# Patient Record
Sex: FEMALE | Race: WHITE | NOT HISPANIC OR LATINO | Employment: OTHER | URBAN - NONMETROPOLITAN AREA
[De-identification: names, ages, dates, MRNs, and addresses within clinical notes are randomized per-mention and may not be internally consistent; named-entity substitution may affect disease eponyms.]

---

## 2017-05-24 ENCOUNTER — OFFICE VISIT (OUTPATIENT)
Dept: CARDIOLOGY | Facility: CLINIC | Age: 52
End: 2017-05-24

## 2017-05-24 VITALS
HEIGHT: 67 IN | SYSTOLIC BLOOD PRESSURE: 161 MMHG | WEIGHT: 201.2 LBS | BODY MASS INDEX: 31.58 KG/M2 | HEART RATE: 93 BPM | DIASTOLIC BLOOD PRESSURE: 97 MMHG | OXYGEN SATURATION: 97 %

## 2017-05-24 DIAGNOSIS — I10 ESSENTIAL HYPERTENSION: ICD-10-CM

## 2017-05-24 DIAGNOSIS — Z00.00 HEALTHCARE MAINTENANCE: ICD-10-CM

## 2017-05-24 DIAGNOSIS — I25.10 CORONARY ARTERY DISEASE INVOLVING NATIVE CORONARY ARTERY OF NATIVE HEART WITHOUT ANGINA PECTORIS: Primary | ICD-10-CM

## 2017-05-24 DIAGNOSIS — G47.33 OSA ON CPAP: ICD-10-CM

## 2017-05-24 DIAGNOSIS — Z99.89 OSA ON CPAP: ICD-10-CM

## 2017-05-24 DIAGNOSIS — E78.00 HYPERCHOLESTEROLEMIA: ICD-10-CM

## 2017-05-24 PROCEDURE — 99214 OFFICE O/P EST MOD 30 MIN: CPT | Performed by: NURSE PRACTITIONER

## 2017-05-24 RX ORDER — HYDROCODONE BITARTRATE AND ACETAMINOPHEN 7.5; 325 MG/1; MG/1
1 TABLET ORAL EVERY 6 HOURS PRN
COMMUNITY

## 2017-05-24 RX ORDER — CLOPIDOGREL BISULFATE 75 MG/1
TABLET ORAL DAILY
COMMUNITY
Start: 2013-06-12 | End: 2017-05-24

## 2017-05-24 RX ORDER — AMITRIPTYLINE HYDROCHLORIDE 25 MG/1
25 TABLET, FILM COATED ORAL NIGHTLY
COMMUNITY
End: 2019-02-06

## 2017-05-24 RX ORDER — CLONIDINE HYDROCHLORIDE 0.1 MG/1
TABLET ORAL 2 TIMES DAILY
COMMUNITY
Start: 2013-06-12 | End: 2017-05-24 | Stop reason: SDUPTHER

## 2017-05-24 RX ORDER — PROMETHAZINE HYDROCHLORIDE 25 MG/1
25 TABLET ORAL EVERY 6 HOURS PRN
COMMUNITY

## 2017-05-24 RX ORDER — GABAPENTIN 400 MG/1
2 CAPSULE ORAL 2 TIMES DAILY
COMMUNITY
End: 2019-02-06 | Stop reason: DRUGHIGH

## 2017-05-24 RX ORDER — CARBAMAZEPINE 200 MG/1
400 TABLET ORAL 2 TIMES DAILY
COMMUNITY
End: 2017-12-11

## 2017-05-24 RX ORDER — NITROGLYCERIN 0.4 MG/1
TABLET SUBLINGUAL
COMMUNITY
Start: 2015-03-27 | End: 2020-06-04 | Stop reason: SDUPTHER

## 2017-05-24 RX ORDER — LISINOPRIL 5 MG/1
TABLET ORAL DAILY
COMMUNITY
Start: 2015-05-08 | End: 2018-06-28

## 2017-05-24 RX ORDER — PANTOPRAZOLE SODIUM 40 MG/1
TABLET, DELAYED RELEASE ORAL 2 TIMES DAILY
COMMUNITY
Start: 2013-06-12 | End: 2018-02-14 | Stop reason: ALTCHOICE

## 2017-05-24 RX ORDER — ATORVASTATIN CALCIUM 80 MG/1
80 TABLET, FILM COATED ORAL DAILY
Qty: 30 TABLET | Refills: 11 | Status: SHIPPED | OUTPATIENT
Start: 2017-05-24 | End: 2018-06-20 | Stop reason: SDUPTHER

## 2017-05-24 RX ORDER — CLONIDINE HYDROCHLORIDE 0.1 MG/1
0.1 TABLET ORAL 3 TIMES DAILY
Qty: 82 TABLET | Refills: 5 | Status: SHIPPED | OUTPATIENT
Start: 2017-05-24 | End: 2018-03-24 | Stop reason: SDUPTHER

## 2017-12-11 ENCOUNTER — OFFICE VISIT (OUTPATIENT)
Dept: NEUROLOGY | Facility: CLINIC | Age: 52
End: 2017-12-11

## 2017-12-11 VITALS
SYSTOLIC BLOOD PRESSURE: 124 MMHG | BODY MASS INDEX: 32.49 KG/M2 | RESPIRATION RATE: 18 BRPM | DIASTOLIC BLOOD PRESSURE: 86 MMHG | HEIGHT: 67 IN | WEIGHT: 207 LBS

## 2017-12-11 DIAGNOSIS — G40.209 PARTIAL SYMPTOMATIC EPILEPSY WITH COMPLEX PARTIAL SEIZURES, NOT INTRACTABLE, WITHOUT STATUS EPILEPTICUS (HCC): Primary | ICD-10-CM

## 2017-12-11 DIAGNOSIS — Z96.89 STATUS POST VNS (VAGUS NERVE STIMULATOR) PLACEMENT: ICD-10-CM

## 2017-12-11 PROCEDURE — 95970 ALYS NPGT W/O PRGRMG: CPT | Performed by: PSYCHIATRY & NEUROLOGY

## 2017-12-11 PROCEDURE — 99213 OFFICE O/P EST LOW 20 MIN: CPT | Performed by: PSYCHIATRY & NEUROLOGY

## 2017-12-11 RX ORDER — OXCARBAZEPINE 300 MG/1
600 TABLET, FILM COATED ORAL 2 TIMES DAILY
Qty: 120 TABLET | Refills: 11 | Status: SHIPPED | OUTPATIENT
Start: 2017-12-11 | End: 2018-12-23 | Stop reason: SDUPTHER

## 2017-12-11 NOTE — PROGRESS NOTES
Subjective   Patient ID: Phyllis Iyer is a 52 y.o. female     Chief Complaint   Patient presents with   • Seizures        History of Present Illness    52 y.o. woman returns in follow up for CPSz and VNS therapy.  Last visit on 5/6/15 renewed CBZ and programmed VNS.       Stopped CBZ for the last month due to cost medication.  No sz off meds.  Last sz 2008.  Aura of right eye visual blurring and pulling.  Right face and tongue goes numb.     Past Medical History:   Diagnosis Date   • Arthritis    • CAD (coronary artery disease)    • Chest tightness or pressure    • Complex partial seizure    • Diabetes mellitus    • Dyslipidemia    • FHx: migraine headaches    • GERD (gastroesophageal reflux disease)    • Hiatal hernia    • Hypercholesterolemia    • Hypertension    • JOSE on CPAP    • Stroke syndrome    • Stroke syndrome      Family History   Problem Relation Age of Onset   • Heart disease Other    • Diabetes Other    • Cancer Other    • Stroke Other    • Coronary artery disease Mother    • Heart attack Mother    • Coronary artery disease Father      Social History     Social History   • Marital status:      Spouse name: N/A   • Number of children: N/A   • Years of education: N/A     Social History Main Topics   • Smoking status: Former Smoker   • Smokeless tobacco: None   • Alcohol use No   • Drug use: None   • Sexual activity: Not Asked     Other Topics Concern   • None     Social History Narrative       Review of Systems   Constitutional: Negative for activity change, fatigue and unexpected weight change.   HENT: Negative for tinnitus and trouble swallowing.    Eyes: Negative for photophobia and visual disturbance.   Respiratory: Negative for apnea, cough and choking.    Cardiovascular: Negative for leg swelling.   Gastrointestinal: Negative for nausea and vomiting.   Endocrine: Negative for cold intolerance and heat intolerance.   Genitourinary: Negative for difficulty urinating, frequency, menstrual  "problem and urgency.   Musculoskeletal: Negative for back pain, gait problem, myalgias and neck pain.   Skin: Negative for color change and rash.   Allergic/Immunologic: Negative for immunocompromised state.   Neurological: Positive for seizures and headaches. Negative for dizziness, tremors, syncope, facial asymmetry, speech difficulty, weakness, light-headedness and numbness.   Hematological: Negative for adenopathy. Does not bruise/bleed easily.   Psychiatric/Behavioral: Positive for decreased concentration and dysphoric mood. Negative for behavioral problems, confusion, hallucinations and sleep disturbance.       Objective     Vitals:    12/11/17 1009   BP: 124/86   BP Location: Left arm   Patient Position: Sitting   Cuff Size: Adult   Resp: 18   Weight: 93.9 kg (207 lb)   Height: 170.2 cm (67\")       Neurologic Exam     Mental Status   Oriented to person, place, and time.   Registration: recalls 3 of 3 objects. Recall at 5 minutes: recalls 3 of 3 objects. Follows 3 step commands.   Attention: normal. Concentration: normal.   Speech: speech is normal   Level of consciousness: alert  Knowledge: good and consistent with education.   Able to name object. Able to read. Able to repeat. Able to write. Normal comprehension.     Cranial Nerves     CN II   Visual fields full to confrontation.   Visual acuity: normal  Right visual field deficit: none  Left visual field deficit: none     CN III, IV, VI   Pupils are equal, round, and reactive to light.  Extraocular motions are normal.   Nystagmus: none   Diplopia: none  Ophthalmoparesis: none  Upgaze: normal  Downgaze: normal  Conjugate gaze: present  Vestibulo-ocular reflex: present    CN V   Facial sensation intact.   Right corneal reflex: normal  Left corneal reflex: normal    CN VII   Right facial weakness: none  Left facial weakness: none    CN VIII   Hearing: intact    CN IX, X   Palate: symmetric  Right gag reflex: normal  Left gag reflex: normal    CN XI   Right " sternocleidomastoid strength: normal  Left sternocleidomastoid strength: normal    CN XII   Tongue: not atrophic  Fasciculations: absent  Tongue deviation: none    Motor Exam   Muscle bulk: normal  Overall muscle tone: normal  Right arm tone: normal  Left arm tone: normal  Right leg tone: normal  Left leg tone: normal    Strength   Strength 5/5 throughout.     Sensory Exam   Light touch normal.   Pinprick normal.     Gait, Coordination, and Reflexes     Gait  Gait: normal    Coordination   Romberg: negative  Finger to nose coordination: normal  Heel to shin coordination: normal  Tandem walking coordination: normal    Tremor   Resting tremor: absent  Intention tremor: absent  Action tremor: absent    Reflexes   Reflexes 2+ except as noted.       Physical Exam   Constitutional: She is oriented to person, place, and time. She appears well-developed and well-nourished.   Eyes: EOM are normal. Pupils are equal, round, and reactive to light.   Neurological: She is oriented to person, place, and time. She has normal strength. She has a normal Finger-Nose-Finger Test, a normal Heel to Shin Test, a normal Romberg Test and a normal Tandem Gait Test. Gait normal.   Psychiatric: Her speech is normal.   Nursing note and vitals reviewed.      Admission on 07/21/2014, Discharged on 07/24/2014   Component Date Value Ref Range Status   • WBC 07/20/2014 8.96  3.50 - 10.80 K/mcL Final   • RBC 07/20/2014 4.50  3.89 - 5.14 M/mcL Final   • Hemoglobin 07/20/2014 12.9  11.5 - 15.5 g/dL Final   • Hematocrit 07/20/2014 38.0  34.5 - 44.0 % Final   • MCV 07/20/2014 84.4  80.0 - 99.0 fL Final   • MCH 07/20/2014 28.7  27.0 - 31.0 pg Final   • MCHC 07/20/2014 33.9  32.0 - 36.0 g/dL Final   • RDW-CV 07/20/2014 12.6  11.3 - 14.5 % Final   • Platelets 07/20/2014 304  150 - 450 K/mcL Final   • Glucose 07/20/2014 116* 70 - 100 mg/dL Final   • BUN 07/20/2014 15  9 - 23 mg/dL Final   • Creatinine 07/20/2014 0.6  0.6 - 1.3 mg/dL Final   • Sodium  07/20/2014 141  132 - 146 mmol/L Final   • Potassium 07/20/2014 3.9  3.5 - 5.5 mmol/L Final   • Chloride 07/20/2014 104  99 - 109 mmol/L Final   • CO2 07/20/2014 33* 20 - 31 mmol/L Final   • Calcium 07/20/2014 9.1  8.7 - 10.4 mg/dL Final   • eGFR 07/20/2014 105  ml/min/1.732 Final    Comment: DF by IF @ 07/20/2014 16:25     [employer] Guidelines        Stage         Description                    GFR        1                Normal or High               90+        2                Mild decrease                 60-89        3                Moderate decrease        30-59        4                Severe decrease            15-29        5                Kidney failure                  <15     • Anion Gap 07/20/2014 4  3 - 11 mmol/L Final   • MRSA, PCR 07/20/2014 Negative  Negative Final    Comment: DF by [id number] @ 07/20/2014 17:25  MRSA Negative     • Hemoglobin A1C 07/20/2014 7.7* 4.00 - 6.00 % Final    Comment: DF by IF @ 07/20/2014 16:28  The American Diabetes Association recommends maintenance of Hemoglobin A1C at 7.0% or lower.  Goals for Hemoglobin A1C reduction may need to be modified if hypoglycemia is a problem.     • Mean Bld Glu Estim. 07/20/2014 171  mg/dL Final   • Glucose 07/21/2014 184* 75 - 125 mg/dL Final   • pH, Venous 07/21/2014 7.433  7.340 - 7.460 Final   • pCO2, Venous 07/21/2014 33.7* 34 - 46 Final   • pO2, Venous 07/21/2014 283.9   Final   • HCO3, Venous 07/21/2014 22.0  20 - 26 Final   • CO2 07/21/2014 23.1  23 - 27 Final   • O2 Saturation, Venous 07/21/2014 99.6* 92 - 98 Final   • Hemoglobin, Blood Gas 07/21/2014 12.4  10.0 - 16.0 g/dL Final   • Hematocrit 07/21/2014 36* 39 - 51 % Final   • Sodium 07/21/2014 135.5  134 - 146 mmol/L Final   • Potassium 07/21/2014 4.13  3.5 - 5.3 mmol/L Final   • Ionized Calcium 07/21/2014 1.01* 1.12 - 1.30 mmol/L Final   • Chloride 07/21/2014 106* 98 - 105 mmol/L Final   • Base Excess 07/21/2014 -1.6   Final   • Glucose 07/21/2014 185* 67 - 93  mg/dL Final   • Hemoglobin 07/22/2014 10.3* 11.5 - 15.5 g/dL Final   • Hematocrit 07/22/2014 31.4* 34.5 - 44.0 % Final   • Glucose 07/21/2014 217* 75 - 125 mg/dL Final   • Glucose 07/21/2014 211* 75 - 125 mg/dL Final   • Glucose 07/22/2014 106  75 - 125 mg/dL Final   • Glucose 07/22/2014 171* 75 - 125 mg/dL Final   • Glucose 07/22/2014 155* 75 - 125 mg/dL Final   • Glucose 07/22/2014 166* 75 - 125 mg/dL Final   • Glucose 07/23/2014 147* 75 - 125 mg/dL Final   • Glucose 07/23/2014 182* 75 - 125 mg/dL Final   • Glucose 07/23/2014 134* 75 - 125 mg/dL Final   • Glucose 07/23/2014 191* 75 - 125 mg/dL Final   • Glucose 07/24/2014 115  75 - 125 mg/dL Final         Assessment/Plan     Problem List Items Addressed This Visit        Nervous and Auditory    Complex partial seizure - Primary    Current Assessment & Plan     Start  mg BID due to cost    VNS checked per flowsheet.          Relevant Medications    GABAPENTIN PO    OXcarbazepine (TRILEPTAL) 300 MG tablet       Other    Status post VNS (vagus nerve stimulator) placement    Current Assessment & Plan     Per flowsheet                   No Follow-up on file.

## 2018-02-09 ENCOUNTER — TELEPHONE (OUTPATIENT)
Dept: CARDIOLOGY | Facility: CLINIC | Age: 53
End: 2018-02-09

## 2018-02-09 DIAGNOSIS — I25.84 CORONARY ARTERY DISEASE DUE TO CALCIFIED CORONARY LESION: Primary | ICD-10-CM

## 2018-02-09 DIAGNOSIS — I25.10 CORONARY ARTERY DISEASE DUE TO CALCIFIED CORONARY LESION: Primary | ICD-10-CM

## 2018-02-09 RX ORDER — CLOPIDOGREL BISULFATE 75 MG/1
75 TABLET ORAL DAILY
Qty: 90 TABLET | Refills: 3 | Status: SHIPPED | OUTPATIENT
Start: 2018-02-09 | End: 2022-09-13

## 2018-02-09 RX ORDER — CLOPIDOGREL BISULFATE 75 MG/1
150 TABLET ORAL ONCE
Qty: 1 TABLET | Refills: 0 | Status: SHIPPED | OUTPATIENT
Start: 2018-02-09 | End: 2018-02-09

## 2018-02-09 NOTE — TELEPHONE ENCOUNTER
----- Message from Sneha Matias sent at 2/9/2018  9:13 AM EST -----  Contact: [phone]  PT IS SHORT OF BREATH AND WAS IN THE HOSPITAL FOR A HEART CATH. SHE WOULD LIKE TO SPEAK TO A NURSE.

## 2018-02-09 NOTE — TELEPHONE ENCOUNTER
PATIENT HAS HAD SHORTNESS OF BREATH SINCE PUT ON BRILINITA IN HOSP. S/P CORONARY STENTING. STATED SHE HAS TAKEN PLAVIX IN THE PAST AND DONE FINE. PER VARGAS JAMISON, INGRID D/C BRILINTA AND SEND IN PLAVIX 150 MG PO DAY 1 THEN 75 MG PO DAILY # 90 WITH 3 REFILLS. PATIENT TO START FRANCISCA. ABOVE SENT IN TO [facility]. PH,LPN

## 2018-02-14 ENCOUNTER — OFFICE VISIT (OUTPATIENT)
Dept: CARDIOLOGY | Facility: CLINIC | Age: 53
End: 2018-02-14

## 2018-02-14 VITALS
HEART RATE: 77 BPM | HEIGHT: 67 IN | WEIGHT: 206.2 LBS | DIASTOLIC BLOOD PRESSURE: 92 MMHG | OXYGEN SATURATION: 99 % | BODY MASS INDEX: 32.36 KG/M2 | SYSTOLIC BLOOD PRESSURE: 145 MMHG

## 2018-02-14 DIAGNOSIS — I25.10 CORONARY ARTERY DISEASE INVOLVING NATIVE CORONARY ARTERY OF NATIVE HEART WITHOUT ANGINA PECTORIS: ICD-10-CM

## 2018-02-14 DIAGNOSIS — I10 ESSENTIAL HYPERTENSION: ICD-10-CM

## 2018-02-14 DIAGNOSIS — R06.02 SHORTNESS OF BREATH: Primary | ICD-10-CM

## 2018-02-14 PROCEDURE — 99213 OFFICE O/P EST LOW 20 MIN: CPT | Performed by: PHYSICIAN ASSISTANT

## 2018-02-14 RX ORDER — ESOMEPRAZOLE MAGNESIUM 40 MG/1
40 CAPSULE, DELAYED RELEASE ORAL
COMMUNITY
End: 2018-06-28 | Stop reason: ALTCHOICE

## 2018-02-14 NOTE — PROGRESS NOTES
Problem list     Subjective   Phyllis Iyer is a 52 y.o. female     Chief Complaint   Patient presents with   • Coronary Artery Disease     Here for hosp. and heart cath. f/u   • Hypertension   • Hyperlipidemia   • Sleep Apnea   • Diabetes       HPI    Problem list:     1. CAD  1.1 cardiac catheterization in 2011 with stenting to the mid LAD as well as proximal, mid, and distal RCA with medical management recommended  1.2 left heart catheterization January 2016 with stenting of the right coronary artery with nonobstructive disease otherwise  1.3 stress test November 2016 but no evidence ischemia and preserved LV function  1.4 cardiac catheterization 5/4/2018 by Dr. Malik because of non-ST elevation myocardial infarction demonstrating high-grade RCA disease status post stenting ×2.  60% IntraStent restenosis of the LAD with medical management recommended.  Normal LV function noted  2.  Preserved systolic function  3.  Hypertension  4.  Dyslipidemia  5.  Diabetes mellitus  6.  Obstructive sleep apnea noncompliant with CPAP therapy    Patient is a 52-year-old female that presents back from hospitalization.  She was hospitalized in the setting of chest pain and non-ST elevation myocardial infarction she underwent catheterization as above.    Since that time, she feels much improved.  She has no chest pain or pressure.  She has mild dyspnea but that is improved since transitioning from Brilinta to Plavix.  No PND orthopnea.  Patient doesn't palpitate or have dysrhythmic symptoms and otherwise is doing well      Outpatient Encounter Prescriptions as of 2/14/2018   Medication Sig Dispense Refill   • amitriptyline (ELAVIL) 25 MG tablet Take 25 mg by mouth Every Night.     • aspirin 81 MG tablet Take 1 tablet by mouth Daily. 30 tablet 11   • atorvastatin (LIPITOR) 80 MG tablet Take 1 tablet by mouth Daily. 30 tablet 11   • CloNIDine (CATAPRES) 0.1 MG tablet Take 1 tablet by mouth 3 (Three) Times a Day. 82 tablet 5   •  clopidogrel (PLAVIX) 75 MG tablet Take 1 tablet by mouth Daily. 90 tablet 3   • Dapagliflozin-Metformin HCl ER (XIGDUO XR)  MG tablet sustained-release 24 hour Take  by mouth Daily.     • Dulaglutide (TRULICITY) 1.5 MG/0.5ML solution pen-injector Inject  under the skin 1 (One) Time Per Week.     • esomeprazole (nexIUM) 40 MG capsule Take 40 mg by mouth Every Morning Before Breakfast.     • GABAPENTIN PO Take 1 tablet by mouth 4 (Four) Times a Day.     • HYDROcodone-acetaminophen (NORCO) 7.5-325 MG per tablet Take 1 tablet by mouth Every 6 (Six) Hours As Needed for Moderate Pain (4-6).     • lisinopril (PRINIVIL,ZESTRIL) 5 MG tablet Take  by mouth Daily.     • metoprolol tartrate (LOPRESSOR) 25 MG tablet Take  by mouth 2 (Two) Times a Day.     • OXcarbazepine (TRILEPTAL) 300 MG tablet Take 2 tablets by mouth 2 (Two) Times a Day. 120 tablet 11   • promethazine (PHENERGAN) 25 MG tablet Take 25 mg by mouth Every 6 (Six) Hours As Needed for Nausea or Vomiting.     • TiZANidine HCl (ZANAFLEX PO) Take 8 mg by mouth Every Night.     • nitroglycerin (NITROSTAT) 0.4 MG SL tablet Place  under the tongue. prn     • [DISCONTINUED] Insulin Glargine (BASAGLAR KWIKPEN SC) Inject 42 Units under the skin Daily.     • [DISCONTINUED] pantoprazole (PROTONIX) 40 MG EC tablet Take  by mouth 2 (Two) Times a Day.       No facility-administered encounter medications on file as of 2/14/2018.        Sulfa antibiotics    Past Medical History:   Diagnosis Date   • Arthritis    • CAD (coronary artery disease)    • Chest tightness or pressure    • Complex partial seizure    • Diabetes mellitus    • Dyslipidemia    • FHx: migraine headaches    • GERD (gastroesophageal reflux disease)    • Hiatal hernia    • Hypercholesterolemia    • Hypertension    • Myocardial infarction    • JOSE on CPAP    • Stroke syndrome    • Stroke syndrome        Social History     Social History   • Marital status:      Spouse name: N/A   • Number of children:  "N/A   • Years of education: N/A     Occupational History   • Not on file.     Social History Main Topics   • Smoking status: Former Smoker   • Smokeless tobacco: Never Used   • Alcohol use No   • Drug use: Not on file   • Sexual activity: Not on file     Other Topics Concern   • Not on file     Social History Narrative       Family History   Problem Relation Age of Onset   • Heart disease Other    • Diabetes Other    • Cancer Other    • Stroke Other    • Coronary artery disease Mother    • Heart attack Mother    • Coronary artery disease Father        Review of Systems   Constitutional: Positive for fatigue.   HENT: Negative.    Eyes: Positive for visual disturbance (glasses).   Respiratory: Positive for shortness of breath.    Cardiovascular: Negative.    Gastrointestinal: Negative.    Endocrine: Negative.    Genitourinary: Negative.    Musculoskeletal: Positive for arthralgias and myalgias.   Skin: Negative.    Allergic/Immunologic: Positive for environmental allergies.   Neurological: Negative.    Hematological: Bruises/bleeds easily.   Psychiatric/Behavioral: Positive for sleep disturbance.       Objective   Vitals:    02/14/18 0948   BP: 145/92   BP Location: Left arm   Patient Position: Sitting   Pulse: 77   SpO2: 99%   Weight: 93.5 kg (206 lb 3.2 oz)   Height: 170.2 cm (67.01\")      /92 (BP Location: Left arm, Patient Position: Sitting)  Pulse 77  Ht 170.2 cm (67.01\")  Wt 93.5 kg (206 lb 3.2 oz)  SpO2 99%  BMI 32.29 kg/m2    Lab Results (most recent)     None          Physical Exam   Constitutional: She is oriented to person, place, and time. She appears well-developed and well-nourished. No distress.   HENT:   Head: Normocephalic and atraumatic.   Eyes: EOM are normal. Pupils are equal, round, and reactive to light.   Neck: No JVD present.   Cardiovascular: Normal rate, regular rhythm and normal heart sounds.  Exam reveals no gallop and no friction rub.    No murmur heard.  Pulmonary/Chest: Effort " normal and breath sounds normal. No respiratory distress. She has no wheezes. She has no rales. She exhibits no tenderness.   Musculoskeletal: Normal range of motion. She exhibits no edema.   Neurological: She is alert and oriented to person, place, and time. No cranial nerve deficit.   Skin: Skin is warm and dry. No rash noted. No erythema. No pallor.   Psychiatric: She has a normal mood and affect. Her behavior is normal.   Nursing note and vitals reviewed.      Procedure   Procedures       Assessment/Plan     Problems Addressed this Visit        Cardiovascular and Mediastinum    CAD (coronary artery disease)    Hypertension       Respiratory    Shortness of breath - Primary            Recommendation  1.  Coronary artery disease  We would like to become more aggressive in regards to patient's lipid management and risk factor modification.  I'm requesting recent lipid parameters and we may consider adding rifampin Praluent based on lipid parameters.  She has 60% IntraStent restenosis of the LAD and has had multiple percutaneous interventions.  We will await those records  2.  Hypertension   blood pressure well-controlled at this time.  We will continue medical regimen  3.  Shortness of breath  Patient likely had an adverse reaction to Brilinta.  We'll continue Plavix therapy.    We will see back follow-up in 6 months.  Follow-up primary as scheduled         Electronically signed by:

## 2018-03-24 DIAGNOSIS — I10 ESSENTIAL HYPERTENSION: ICD-10-CM

## 2018-03-26 RX ORDER — CLONIDINE HYDROCHLORIDE 0.1 MG/1
TABLET ORAL
Qty: 90 TABLET | Refills: 4 | Status: SHIPPED | OUTPATIENT
Start: 2018-03-26 | End: 2018-11-07 | Stop reason: SDUPTHER

## 2018-04-27 ENCOUNTER — TELEPHONE (OUTPATIENT)
Dept: CARDIOLOGY | Facility: CLINIC | Age: 53
End: 2018-04-27

## 2018-04-27 NOTE — TELEPHONE ENCOUNTER
Dr. Chay Pruitt called to speak with INGRID Moncada re: patient. Pt is currently in hospital and is going to be release. Per INGRID Moncada patient to come in Monday for nurse visit EKG. I have asked JERRY Licea to call and schedule nurse visit apt. With patient as they can discuss what time is best for pt.

## 2018-06-20 DIAGNOSIS — E78.00 HYPERCHOLESTEROLEMIA: ICD-10-CM

## 2018-06-20 RX ORDER — ATORVASTATIN CALCIUM 80 MG/1
TABLET, FILM COATED ORAL
Qty: 30 TABLET | Refills: 10 | Status: SHIPPED | OUTPATIENT
Start: 2018-06-20 | End: 2019-05-01 | Stop reason: SDUPTHER

## 2018-06-28 ENCOUNTER — OFFICE VISIT (OUTPATIENT)
Dept: CARDIOLOGY | Facility: CLINIC | Age: 53
End: 2018-06-28

## 2018-06-28 VITALS
HEART RATE: 70 BPM | HEIGHT: 67 IN | OXYGEN SATURATION: 97 % | WEIGHT: 210.8 LBS | BODY MASS INDEX: 33.09 KG/M2 | DIASTOLIC BLOOD PRESSURE: 80 MMHG | SYSTOLIC BLOOD PRESSURE: 119 MMHG

## 2018-06-28 DIAGNOSIS — R07.9 CHEST PAIN, UNSPECIFIED TYPE: ICD-10-CM

## 2018-06-28 DIAGNOSIS — R06.02 SHORTNESS OF BREATH: Primary | ICD-10-CM

## 2018-06-28 DIAGNOSIS — I25.10 CORONARY ARTERY DISEASE INVOLVING NATIVE CORONARY ARTERY OF NATIVE HEART WITHOUT ANGINA PECTORIS: ICD-10-CM

## 2018-06-28 DIAGNOSIS — E11.00 TYPE 2 DIABETES MELLITUS WITH HYPEROSMOLARITY WITHOUT COMA, WITHOUT LONG-TERM CURRENT USE OF INSULIN (HCC): ICD-10-CM

## 2018-06-28 DIAGNOSIS — R00.2 PALPITATIONS: ICD-10-CM

## 2018-06-28 PROCEDURE — 99214 OFFICE O/P EST MOD 30 MIN: CPT | Performed by: PHYSICIAN ASSISTANT

## 2018-06-28 RX ORDER — RANOLAZINE 500 MG/1
500 TABLET, EXTENDED RELEASE ORAL 2 TIMES DAILY
Qty: 60 TABLET | Refills: 6 | Status: SHIPPED | OUTPATIENT
Start: 2018-06-28 | End: 2018-08-06 | Stop reason: SINTOL

## 2018-06-28 RX ORDER — LOSARTAN POTASSIUM 25 MG/1
25 TABLET ORAL DAILY
Qty: 30 TABLET | Refills: 6 | Status: SHIPPED | OUTPATIENT
Start: 2018-06-28 | End: 2019-01-19 | Stop reason: SDUPTHER

## 2018-06-28 RX ORDER — OMEPRAZOLE 40 MG/1
40 CAPSULE, DELAYED RELEASE ORAL 2 TIMES DAILY
COMMUNITY
End: 2018-08-06 | Stop reason: ALTCHOICE

## 2018-06-28 NOTE — PROGRESS NOTES
Problem list     Subjective   Phyllis Iyer is a 53 y.o. female     Chief Complaint   Patient presents with   • Chest Pain     Here for hosp. f/u   • Shortness of Breath       HPI    Problem list:     1. CAD  1.1 cardiac catheterization in 2011 with stenting to the mid LAD as well as proximal, mid, and distal RCA with medical management recommended  1.2 left heart catheterization January 2016 with stenting of the right coronary artery with nonobstructive disease otherwise  1.3 stress test November 2016 but no evidence ischemia and preserved LV function  1.4 cardiac catheterization 5/4/2018 by Dr. Malik because of non-ST elevation myocardial infarction demonstrating high-grade RCA disease status post stenting ×2.  60% IntraStent restenosis of the LAD with medical management recommended.  Normal LV function noted  2.  Preserved systolic function  3.  Hypertension  4.  Dyslipidemia  5.  Diabetes mellitus  6.  Obstructive sleep apnea noncompliant with CPAP therapy    Patient is a 53-year-old female that presents back for follow-up.  Patient describes that she's been experiencing chest discomfort.  Chest pain is similar to what she felt with previous myocardial infarction.    This does not happen on a daily basis.  However she has been concerne    But one dayShe did go to the hospital because  she did have significant chest discomfort.  She was  at home and developed some significant pressure and went to the emergency room.  She was observed overnight with no acute EKG changes and troponin was negative.  She was discharged the next day.    Patient does have mild to moderate levels of shortness of breath.  He has progressed to a mild degree.  No PND orthopnea.  She doesn't palpitate or dysrhythmic symptoms and otherwise is doing well    Outpatient Encounter Prescriptions as of 6/28/2018   Medication Sig Dispense Refill   • amitriptyline (ELAVIL) 25 MG tablet Take 25 mg by mouth Every Night.     • aspirin 81 MG tablet  Take 1 tablet by mouth Daily. 30 tablet 11   • atorvastatin (LIPITOR) 80 MG tablet TAKE ONE TABLET BY MOUTH DAILY 30 tablet 10   • CloNIDine (CATAPRES) 0.1 MG tablet TAKE ONE TABLET BY MOUTH THREE TIMES A DAY 90 tablet 4   • clopidogrel (PLAVIX) 75 MG tablet Take 1 tablet by mouth Daily. 90 tablet 3   • Dapagliflozin-Metformin HCl ER (XIGDUO XR)  MG tablet sustained-release 24 hour Take  by mouth Daily.     • Dulaglutide (TRULICITY) 1.5 MG/0.5ML solution pen-injector Inject  under the skin 1 (One) Time Per Week.     • gabapentin (NEURONTIN) 400 MG capsule Take 2 tablets by mouth 2 (Two) Times a Day.     • HYDROcodone-acetaminophen (NORCO) 7.5-325 MG per tablet Take 1 tablet by mouth Every 6 (Six) Hours As Needed for Moderate Pain (4-6).     • metoprolol tartrate (LOPRESSOR) 25 MG tablet Take  by mouth 2 (Two) Times a Day.     • omeprazole (priLOSEC) 40 MG capsule Take 40 mg by mouth 2 (Two) Times a Day.     • OXcarbazepine (TRILEPTAL) 300 MG tablet Take 2 tablets by mouth 2 (Two) Times a Day. 120 tablet 11   • promethazine (PHENERGAN) 25 MG tablet Take 25 mg by mouth Every 6 (Six) Hours As Needed for Nausea or Vomiting.     • TiZANidine HCl (ZANAFLEX PO) Take 8 mg by mouth Every Night.     • [DISCONTINUED] lisinopril (PRINIVIL,ZESTRIL) 5 MG tablet Take  by mouth Daily.     • losartan (COZAAR) 25 MG tablet Take 1 tablet by mouth Daily. 30 tablet 6   • nitroglycerin (NITROSTAT) 0.4 MG SL tablet Place  under the tongue. prn     • ranolazine (RANEXA) 500 MG 12 hr tablet Take 1 tablet by mouth 2 (Two) Times a Day. 60 tablet 6   • [DISCONTINUED] esomeprazole (nexIUM) 40 MG capsule Take 40 mg by mouth Every Morning Before Breakfast.       No facility-administered encounter medications on file as of 6/28/2018.        Sulfa antibiotics    Past Medical History:   Diagnosis Date   • Arthritis    • CAD (coronary artery disease)    • Chest tightness or pressure    • Complex partial seizure    • Diabetes mellitus    •  "Dyslipidemia    • FHx: migraine headaches    • GERD (gastroesophageal reflux disease)    • Hiatal hernia    • Hypercholesterolemia    • Hypertension    • Myocardial infarction    • JOSE on CPAP    • Stroke syndrome    • Stroke syndrome        Social History     Social History   • Marital status:      Spouse name: N/A   • Number of children: N/A   • Years of education: N/A     Occupational History   • Not on file.     Social History Main Topics   • Smoking status: Former Smoker   • Smokeless tobacco: Never Used   • Alcohol use No   • Drug use: Unknown   • Sexual activity: Not on file     Other Topics Concern   • Not on file     Social History Narrative   • No narrative on file       Family History   Problem Relation Age of Onset   • Heart disease Other    • Diabetes Other    • Cancer Other    • Stroke Other    • Coronary artery disease Mother    • Heart attack Mother    • Coronary artery disease Father        Review of Systems   Constitutional: Positive for fatigue.   HENT: Negative.    Eyes: Positive for visual disturbance (glasses).   Respiratory: Positive for shortness of breath.    Cardiovascular: Positive for chest pain, palpitations and leg swelling.   Gastrointestinal: Positive for abdominal pain.        HX GERD   Endocrine: Negative.    Genitourinary: Negative.    Musculoskeletal: Positive for arthralgias and myalgias.   Skin: Negative.    Allergic/Immunologic: Positive for environmental allergies.   Neurological: Negative.    Hematological: Bruises/bleeds easily.   Psychiatric/Behavioral: Negative.    All other systems reviewed and are negative.      Objective   Vitals:    06/28/18 1013   BP: 119/80   BP Location: Left arm   Patient Position: Sitting   Pulse: 70   SpO2: 97%   Weight: 95.6 kg (210 lb 12.8 oz)   Height: 170.2 cm (67.01\")      /80 (BP Location: Left arm, Patient Position: Sitting)   Pulse 70   Ht 170.2 cm (67.01\")   Wt 95.6 kg (210 lb 12.8 oz)   SpO2 97%   BMI 33.01 kg/m² "     Lab Results (most recent)     None          Physical Exam   Constitutional: She is oriented to person, place, and time. She appears well-developed and well-nourished. No distress.   HENT:   Head: Normocephalic and atraumatic.   Eyes: EOM are normal. Pupils are equal, round, and reactive to light.   Neck: No JVD present.   Cardiovascular: Normal rate, regular rhythm, normal heart sounds and intact distal pulses.  Exam reveals no gallop and no friction rub.    No murmur heard.  Pulmonary/Chest: Effort normal and breath sounds normal. No respiratory distress. She has no wheezes. She has no rales. She exhibits no tenderness.   Musculoskeletal: Normal range of motion. She exhibits no edema.   Neurological: She is alert and oriented to person, place, and time. No cranial nerve deficit.   Skin: Skin is warm and dry. No rash noted. No erythema. No pallor.   Psychiatric: She has a normal mood and affect. Her behavior is normal.   Nursing note and vitals reviewed.      Procedure   Procedures       Assessment/Plan     Problems Addressed this Visit        Cardiovascular and Mediastinum    Coronary artery disease involving native coronary artery of native heart without angina pectoris    Relevant Medications    ranolazine (RANEXA) 500 MG 12 hr tablet    Other Relevant Orders    CBC & Differential    Lipid Panel    TSH    CK    Vitamin D 1,25 Dihydroxy    Hemoglobin A1c    Cardiac catheterization    Comprehensive Metabolic Panel    Palpitations    Relevant Orders    CBC & Differential    Lipid Panel    TSH    CK    Vitamin D 1,25 Dihydroxy    Hemoglobin A1c    Cardiac catheterization    Comprehensive Metabolic Panel       Respiratory    Shortness of breath - Primary    Relevant Orders    CBC & Differential    Lipid Panel    TSH    CK    Vitamin D 1,25 Dihydroxy    Hemoglobin A1c    Cardiac catheterization    Comprehensive Metabolic Panel       Endocrine    Diabetes mellitus    Relevant Orders    CBC & Differential    Lipid  Panel    TSH    CK    Vitamin D 1,25 Dihydroxy    Hemoglobin A1c    Cardiac catheterization    Comprehensive Metabolic Panel       Nervous and Auditory    Chest pain    Relevant Orders    CBC & Differential    Lipid Panel    TSH    CK    Vitamin D 1,25 Dihydroxy    Hemoglobin A1c    Cardiac catheterization    Comprehensive Metabolic Panel             recommendation  1.  Patient is concerned about chest discomfort.  She is on antianginal therapy with continued symptoms.  At last catheterization several months ago, she had 60% IntraStent restenosis of the LAD and patient voices much concern.  She has already went to the hospital once for chest pain and she continues to have chest discomfort.  I am adding Ranexa but patient would like repeat catheterization to evaluate her LAD.  She is failing guide line directed therapy.  She will undergo repeat catheterization have the LAD reevaluated.  2.  Otherwise, patient would like routine laboratories.  Because of repeat stenosis, would like to reevaluate lipid status.  She requests routine laboratories as well as well which she will follow-up with her family doctor.  3.  Any chest pain not resolved by nitroglycerin, she will go to the ER.  Follow-up primary as scheduled              Patient's Body mass index is 33.01 kg/m². BMI is above normal parameters. Recommendations include: educational material.       Electronically signed by:

## 2018-06-28 NOTE — PATIENT INSTRUCTIONS
Obesity, Adult  Obesity is the condition of having too much total body fat. Being overweight or obese means that your weight is greater than what is considered healthy for your body size. Obesity is determined by a measurement called BMI. BMI is an estimate of body fat and is calculated from height and weight. For adults, a BMI of 30 or higher is considered obese.  Obesity can eventually lead to other health concerns and major illnesses, including:  · Stroke.  · Coronary artery disease (CAD).  · Type 2 diabetes.  · Some types of cancer, including cancers of the colon, breast, uterus, and gallbladder.  · Osteoarthritis.  · High blood pressure (hypertension).  · High cholesterol.  · Sleep apnea.  · Gallbladder stones.  · Infertility problems.    What are the causes?  The main cause of obesity is taking in (consuming) more calories than your body uses for energy. Other factors that contribute to this condition may include:  · Being born with genes that make you more likely to become obese.  · Having a medical condition that causes obesity. These conditions include:  ? Hypothyroidism.  ? Polycystic ovarian syndrome (PCOS).  ? Binge-eating disorder.  ? Cushing syndrome.  · Taking certain medicines, such as steroids, antidepressants, and seizure medicines.  · Not being physically active (sedentary lifestyle).  · Living where there are limited places to exercise safely or buy healthy foods.  · Not getting enough sleep.    What increases the risk?  The following factors may increase your risk of this condition:  · Having a family history of obesity.  · Being a woman of -American descent.  · Being a man of  descent.    What are the signs or symptoms?  Having excessive body fat is the main symptom of this condition.  How is this diagnosed?  This condition may be diagnosed based on:  · Your symptoms.  · Your medical history.  · A physical exam. Your health care provider may measure:  ? Your BMI. If you are an  adult with a BMI between 25 and less than 30, you are considered overweight. If you are an adult with a BMI of 30 or higher, you are considered obese.  ? The distances around your hips and your waist (circumferences). These may be compared to each other to help diagnose your condition.  ? Your skinfold thickness. Your health care provider may gently pinch a fold of your skin and measure it.    How is this treated?  Treatment for this condition often includes changing your lifestyle. Treatment may include some or all of the following:  · Dietary changes. Work with your health care provider and a dietitian to set a weight-loss goal that is healthy and reasonable for you. Dietary changes may include eating:  ? Smaller portions. A portion size is the amount of a particular food that is healthy for you to eat at one time. This varies from person to person.  ? Low-calorie or low-fat options.  ? More whole grains, fruits, and vegetables.  · Regular physical activity. This may include aerobic activity (cardio) and strength training.  · Medicine to help you lose weight. Your health care provider may prescribe medicine if you are unable to lose 1 pound a week after 6 weeks of eating more healthily and doing more physical activity.  · Surgery. Surgical options may include gastric banding and gastric bypass. Surgery may be done if:  ? Other treatments have not helped to improve your condition.  ? You have a BMI of 40 or higher.  ? You have life-threatening health problems related to obesity.    Follow these instructions at home:    Eating and drinking    · Follow recommendations from your health care provider about what you eat and drink. Your health care provider may advise you to:  ? Limit fast foods, sweets, and processed snack foods.  ? Choose low-fat options, such as low-fat milk instead of whole milk.  ? Eat 5 or more servings of fruits or vegetables every day.  ? Eat at home more often. This gives you more control over  what you eat.  ? Choose healthy foods when you eat out.  ? Learn what a healthy portion size is.  ? Keep low-fat snacks on hand.  ? Avoid sugary drinks, such as soda, fruit juice, iced tea sweetened with sugar, and flavored milk.  ? Eat a healthy breakfast.  · Drink enough water to keep your urine clear or pale yellow.  · Do not go without eating for long periods of time (do not fast) or follow a fad diet. Fasting and fad diets can be unhealthy and even dangerous.  Physical Activity  · Exercise regularly, as told by your health care provider. Ask your health care provider what types of exercise are safe for you and how often you should exercise.  · Warm up and stretch before being active.  · Cool down and stretch after being active.  · Rest between periods of activity.  Lifestyle  · Limit the time that you spend in front of your TV, computer, or video game system.  · Find ways to reward yourself that do not involve food.  · Limit alcohol intake to no more than 1 drink a day for nonpregnant women and 2 drinks a day for men. One drink equals 12 oz of beer, 5 oz of wine, or 1½ oz of hard liquor.  General instructions  · Keep a weight loss journal to keep track of the food you eat and how much you exercise you get.  · Take over-the-counter and prescription medicines only as told by your health care provider.  · Take vitamins and supplements only as told by your health care provider.  · Consider joining a support group. Your health care provider may be able to recommend a support group.  · Keep all follow-up visits as told by your health care provider. This is important.  Contact a health care provider if:  · You are unable to meet your weight loss goal after 6 weeks of dietary and lifestyle changes.  This information is not intended to replace advice given to you by your health care provider. Make sure you discuss any questions you have with your health care provider.  Document Released: 01/25/2006 Document Revised:  05/22/2017 Document Reviewed: 10/05/2016  [employer] Interactive Patient Education © 2018 [employer]  MyPlate from [employer]  The general, healthful diet is based on the 2010 Dietary Guidelines for Americans. The amount of food you need to eat from each food group depends on your age, sex, and level of physical activity and can be individualized by a dietitian. Go to ChooseMyPlate.gov for more information.  What do I need to know about the MyPlate plan?  · Enjoy your food, but eat less.  · Avoid oversized portions.  ? ½ of your plate should include fruits and vegetables.  ? ¼ of your plate should be grains.  ? ¼ of your plate should be protein.  Grains  · Make at least half of your grains whole grains.  · For a 2,000 calorie daily food plan, eat 6 oz every day.  · 1 oz is about 1 slice bread, 1 cup cereal, or ½ cup cooked rice, cereal, or pasta.  Vegetables  · Make half your plate fruits and vegetables.  · For a 2,000 calorie daily food plan, eat 2½ cups every day.  · 1 cup is about 1 cup raw or cooked vegetables or vegetable juice or 2 cups raw leafy greens.  Fruits  · Make half your plate fruits and vegetables.  · For a 2,000 calorie daily food plan, eat 2 cups every day.  · 1 cup is about 1 cup fruit or 100% fruit juice or ½ cup dried fruit.  Protein  · For a 2,000 calorie daily food plan, eat 5½ oz every day.  · 1 oz is about 1 oz meat, poultry, or fish, ¼ cup cooked beans, 1 egg, 1 Tbsp peanut butter, or ½ oz nuts or seeds.  Dairy  · Switch to fat-free or low-fat (1%) milk.  · For a 2,000 calorie daily food plan, eat 3 cups every day.  · 1 cup is about 1 cup milk or yogurt or soy milk (soy beverage), 1½ oz natural cheese, or 2 oz processed cheese.  Fats, Oils, and Empty Calories  · Only small amounts of oils are recommended.  · Empty calories are calories from solid fats or added sugars.  · Compare sodium in foods like soup, bread, and frozen meals. Choose the foods with lower numbers.  · Drink water instead of  sugary drinks.  What foods can I eat?  Grains  Whole grains such as whole wheat, quinoa, millet, and bulgur. Bread, rolls, and pasta made from whole grains. Brown or wild rice. Hot or cold cereals made from whole grains and without added sugar.  Vegetables  All fresh vegetables, especially fresh red, dark green, or orange vegetables. Peas and beans. Low-sodium frozen or canned vegetables prepared without added salt. Low-sodium vegetable juices.  Fruits  All fresh, frozen, and dried fruits. Canned fruit packed in water or fruit juice without added sugar. Fruit juices without added sugar.  Meats and Other Protein Sources  Boiled, baked, or grilled lean meat trimmed of fat. Skinless poultry. Fresh seafood and shellfish. Canned seafood packed in water. Unsalted nuts and unsalted nut butters. Tofu. Dried beans and pea. Eggs.  Dairy  Low-fat or fat-free milk, yogurt, and cheeses.  Sweets and Desserts  Frozen desserts made from low-fat milk.  Fats and Oils  Olive, peanut, and canola oils and margarine. Salad dressing and mayonnaise made from these oils.  Other  Soups and casseroles made from allowed ingredients and without added fat or salt.  The items listed above may not be a complete list of recommended foods or beverages. Contact your dietitian for more options.  What foods are not recommended?  Grains  Sweetened, low-fiber cereals. Packaged baked goods. Snack crackers and chips. Cheese crackers, butter crackers, and biscuits. Frozen waffles, sweet breads, doughnuts, pastries, packaged baking mixes, pancakes, cakes, and cookies.  Vegetables  Regular canned or frozen vegetables or vegetables prepared with salt. Canned tomatoes. Canned tomato sauce. Fried vegetables. Vegetables in cream sauce or cheese sauce.  Fruits  Fruits packed in syrup or made with added sugar.  Meats and Other Protein Sources  Marbled or fatty meats such as ribs. Poultry with skin. Fried meats, poultry, eggs, or fish. Sausages, hot dogs, and deli  meats such as pastrami, bologna, or salami.  Dairy  Whole milk, cream, cheeses made from whole milk, sour cream. Ice cream or yogurt made from whole milk or with added sugar.  Beverages  For adults, no more than one alcoholic drink per day. Regular soft drinks or other sugary beverages. Juice drinks.  Sweets and Desserts  Sugary or fatty desserts, candy, and other sweets.  Fats and Oils  Solid shortening or partially hydrogenated oils. Solid margarine. Margarine that contains trans fats. Butter.  The items listed above may not be a complete list of foods and beverages to avoid. Contact your dietitian for more information.  This information is not intended to replace advice given to you by your health care provider. Make sure you discuss any questions you have with your health care provider.  Document Released: 01/06/2009 Document Revised: 05/25/2017 Document Reviewed: 11/26/2014  [employer] Interactive Patient Education © 2018 [employer]

## 2018-07-20 ENCOUNTER — OUTSIDE FACILITY SERVICE (OUTPATIENT)
Dept: CARDIOLOGY | Facility: CLINIC | Age: 53
End: 2018-07-20

## 2018-07-20 PROCEDURE — 93458 L HRT ARTERY/VENTRICLE ANGIO: CPT | Performed by: INTERNAL MEDICINE

## 2018-07-20 PROCEDURE — 92928 PRQ TCAT PLMT NTRAC ST 1 LES: CPT | Performed by: INTERNAL MEDICINE

## 2018-07-20 PROCEDURE — 92978 ENDOLUMINL IVUS OCT C 1ST: CPT | Performed by: INTERNAL MEDICINE

## 2018-07-20 PROCEDURE — 93571 IV DOP VEL&/PRESS C FLO 1ST: CPT | Performed by: INTERNAL MEDICINE

## 2018-08-06 ENCOUNTER — OFFICE VISIT (OUTPATIENT)
Dept: CARDIOLOGY | Facility: CLINIC | Age: 53
End: 2018-08-06

## 2018-08-06 VITALS
HEART RATE: 66 BPM | WEIGHT: 213.2 LBS | DIASTOLIC BLOOD PRESSURE: 80 MMHG | OXYGEN SATURATION: 97 % | SYSTOLIC BLOOD PRESSURE: 120 MMHG | HEIGHT: 67 IN | BODY MASS INDEX: 33.46 KG/M2

## 2018-08-06 DIAGNOSIS — I10 ESSENTIAL HYPERTENSION: ICD-10-CM

## 2018-08-06 DIAGNOSIS — I25.10 CORONARY ARTERY DISEASE INVOLVING NATIVE CORONARY ARTERY OF NATIVE HEART WITHOUT ANGINA PECTORIS: ICD-10-CM

## 2018-08-06 DIAGNOSIS — R06.02 SHORTNESS OF BREATH: Primary | ICD-10-CM

## 2018-08-06 PROCEDURE — 99213 OFFICE O/P EST LOW 20 MIN: CPT | Performed by: PHYSICIAN ASSISTANT

## 2018-08-06 RX ORDER — INSULIN DEGLUDEC INJECTION 100 U/ML
50 INJECTION, SOLUTION SUBCUTANEOUS 2 TIMES DAILY
COMMUNITY
Start: 2018-07-08 | End: 2022-07-01

## 2018-08-06 RX ORDER — PANTOPRAZOLE SODIUM 40 MG/1
TABLET, DELAYED RELEASE ORAL DAILY
COMMUNITY
Start: 2018-07-21

## 2018-08-06 RX ORDER — TIZANIDINE 4 MG/1
TABLET ORAL DAILY
COMMUNITY
Start: 2018-07-29 | End: 2019-02-06

## 2018-08-06 NOTE — PATIENT INSTRUCTIONS
Heart-Healthy Eating Plan  Many factors influence your heart health, including eating and exercise habits. Heart (coronary) risk increases with abnormal blood fat (lipid) levels. Heart-healthy meal planning includes limiting unhealthy fats, increasing healthy fats, and making other small dietary changes. This includes maintaining a healthy body weight to help keep lipid levels within a normal range.  What is my plan?  Your health care provider recommends that you:  · Get no more than _________% of the total calories in your daily diet from fat.  · Limit your intake of saturated fat to less than _________% of your total calories each day.  · Limit the amount of cholesterol in your diet to less than _________ mg per day.    What types of fat should I choose?  · Choose healthy fats more often. Choose monounsaturated and polyunsaturated fats, such as olive oil and canola oil, flaxseeds, walnuts, almonds, and seeds.  · Eat more omega-3 fats. Good choices include salmon, mackerel, sardines, tuna, flaxseed oil, and ground flaxseeds. Aim to eat fish at least two times each week.  · Limit saturated fats. Saturated fats are primarily found in animal products, such as meats, butter, and cream. Plant sources of saturated fats include palm oil, palm kernel oil, and coconut oil.  · Avoid foods with partially hydrogenated oils in them. These contain trans fats. Examples of foods that contain trans fats are stick margarine, some tub margarines, cookies, crackers, and other baked goods.  What general guidelines do I need to follow?  · Check food labels carefully to identify foods with trans fats or high amounts of saturated fat.  · Fill one half of your plate with vegetables and green salads. Eat 4-5 servings of vegetables per day. A serving of vegetables equals 1 cup of raw leafy vegetables, ½ cup of raw or cooked cut-up vegetables, or ½ cup of vegetable juice.  · Fill one fourth of your plate with whole grains. Look for the word  "\"whole\" as the first word in the ingredient list.  · Fill one fourth of your plate with lean protein foods.  · Eat 4-5 servings of fruit per day. A serving of fruit equals one medium whole fruit, ¼ cup of dried fruit, ½ cup of fresh, frozen, or canned fruit, or ½ cup of 100% fruit juice.  · Eat more foods that contain soluble fiber. Examples of foods that contain this type of fiber are apples, broccoli, carrots, beans, peas, and barley. Aim to get 20-30 g of fiber per day.  · Eat more home-cooked food and less restaurant, buffet, and fast food.  · Limit or avoid alcohol.  · Limit foods that are high in starch and sugar.  · Avoid fried foods.  · Cook foods by using methods other than frying. Baking, boiling, grilling, and broiling are all great options. Other fat-reducing suggestions include:  ? Removing the skin from poultry.  ? Removing all visible fats from meats.  ? Skimming the fat off of stews, soups, and gravies before serving them.  ? Steaming vegetables in water or broth.  · Lose weight if you are overweight. Losing just 5-10% of your initial body weight can help your overall health and prevent diseases such as diabetes and heart disease.  · Increase your consumption of nuts, legumes, and seeds to 4-5 servings per week. One serving of dried beans or legumes equals ½ cup after being cooked, one serving of nuts equals 1½ ounces, and one serving of seeds equals ½ ounce or 1 tablespoon.  · You may need to monitor your salt (sodium) intake, especially if you have high blood pressure. Talk with your health care provider or dietitian to get more information about reducing sodium.  What foods can I eat?  Grains    Breads, including Kazakh, white, maile, wheat, raisin, rye, oatmeal, and Italian. Tortillas that are neither fried nor made with lard or trans fat. Low-fat rolls, including hotdog and hamburger buns and English muffins. Biscuits. Muffins. Waffles. Pancakes. Light popcorn. Whole-grain cereals. Flatbread. " Jessica toast. Pretzels. Breadsticks. Rusks. Low-fat snacks and crackers, including oyster, saltine, matzo, dontae, animal, and rye. Rice and pasta, including brown rice and those that are made with whole wheat.  Vegetables  All vegetables.  Fruits  All fruits, but limit coconut.  Meats and Other Protein Sources  Lean, well-trimmed beef, veal, pork, and lamb. Chicken and turkey without skin. All fish and shellfish. Wild duck, rabbit, pheasant, and venison. Egg whites or low-cholesterol egg substitutes. Dried beans, peas, lentils, and tofu. Seeds and most nuts.  Dairy  Low-fat or nonfat cheeses, including ricotta, string, and mozzarella. Skim or 1% milk that is liquid, powdered, or evaporated. Buttermilk that is made with low-fat milk. Nonfat or low-fat yogurt.  Beverages  Mineral water. Diet carbonated beverages.  Sweets and Desserts  Sherbets and fruit ices. Honey, jam, marmalade, jelly, and syrups. Meringues and gelatins. Pure sugar candy, such as hard candy, jelly beans, gumdrops, mints, marshmallows, and small amounts of dark chocolate. Chay food cake.  Eat all sweets and desserts in moderation.  Fats and Oils  Nonhydrogenated (trans-free) margarines. Vegetable oils, including soybean, sesame, sunflower, olive, peanut, safflower, corn, canola, and cottonseed. Salad dressings or mayonnaise that are made with a vegetable oil. Limit added fats and oils that you use for cooking, baking, salads, and as spreads.  Other  Cocoa powder. Coffee and tea. All seasonings and condiments.  The items listed above may not be a complete list of recommended foods or beverages. Contact your dietitian for more options.  What foods are not recommended?  Grains  Breads that are made with saturated or trans fats, oils, or whole milk. Croissants. Butter rolls. Cheese breads. Sweet rolls. Donuts. Buttered popcorn. Chow mein noodles. High-fat crackers, such as cheese or butter crackers.  Meats and Other Protein Sources  Fatty meats, such  as hotdogs, short ribs, sausage, spareribs, amos, ribeye roast or steak, and mutton. High-fat deli meats, such as salami and bologna. Caviar. Domestic duck and goose. Organ meats, such as kidney, liver, sweetbreads, brains, gizzard, chitterlings, and heart.  Dairy  Cream, sour cream, cream cheese, and creamed cottage cheese. Whole milk cheeses, including blue (jason), [place], Brie, Juma, American, Havarti, Swiss, cheddar, Camembert, and [place]. Whole or 2% milk that is liquid, evaporated, or condensed. Whole buttermilk. Cream sauce or high-fat cheese sauce. Yogurt that is made from whole milk.  Beverages  Regular sodas and drinks with added sugar.  Sweets and Desserts  Frosting. Pudding. Cookies. Cakes other than mihai food cake. Candy that has milk chocolate or white chocolate, hydrogenated fat, butter, coconut, or unknown ingredients. Buttered syrups. Full-fat ice cream or ice cream drinks.  Fats and Oils  Gravy that has suet, meat fat, or shortening. Cocoa butter, hydrogenated oils, palm oil, coconut oil, palm kernel oil. These can often be found in baked products, candy, fried foods, nondairy creamers, and whipped toppings. Solid fats and shortenings, including amos fat, salt pork, lard, and butter. Nondairy cream substitutes, such as coffee creamers and sour cream substitutes. Salad dressings that are made of unknown oils, cheese, or sour cream.  The items listed above may not be a complete list of foods and beverages to avoid. Contact your dietitian for more information.  This information is not intended to replace advice given to you by your health care provider. Make sure you discuss any questions you have with your health care provider.  Document Released: 09/26/2009 Document Revised: 07/07/2017 Document Reviewed: 06/11/2015  [employer] Interactive Patient Education © 2018 [employer]

## 2018-08-06 NOTE — PROGRESS NOTES
Problem list     Subjective   Phyllis Iyer is a 53 y.o. female     Chief Complaint   Patient presents with   • Follow-up     presents for cath follow up   • Coronary Artery Disease       HPI    Problem list:     1. CAD  1.1 cardiac catheterization in 2011 with stenting to the mid LAD as well as proximal, mid, and distal RCA with medical management recommended  1.2 left heart catheterization January 2016 with stenting of the right coronary artery with nonobstructive disease otherwise  1.3 stress test November 2016 but no evidence ischemia and preserved LV function  1.4 cardiac catheterization 5/4/2018 by Dr. Malik because of non-ST elevation myocardial infarction demonstrating high-grade RCA disease status post stenting ×2.  60% IntraStent restenosis of the LAD with medical management recommended.  Normal LV function noted  2.  Preserved systolic function  3.  Hypertension  4.  Dyslipidemia  5.  Diabetes mellitus  6.  Obstructive sleep apnea noncompliant with CPAP therapy    Patient is a 53-year-old female that presents back from cardiac catheterization.  Patient underwent stenting of the LAD because of refractory angina.    Patient describes feeling remarkably well.  She does not have any chest discomfort.  She has mild dyspnea with exertion.  She has to describes this improving since discontinuing Ranexa.  No PND orthopnea.  No palpitations or dysrhythmic symptoms and otherwise is doing well    We reviewed patient's recent lipid parameters and LDL is approximately 120 but total cholesterol normal limits      Outpatient Encounter Prescriptions as of 8/6/2018   Medication Sig Dispense Refill   • amitriptyline (ELAVIL) 25 MG tablet Take 25 mg by mouth Every Night.     • aspirin 81 MG tablet Take 1 tablet by mouth Daily. 30 tablet 11   • atorvastatin (LIPITOR) 80 MG tablet TAKE ONE TABLET BY MOUTH DAILY 30 tablet 10   • CloNIDine (CATAPRES) 0.1 MG tablet TAKE ONE TABLET BY MOUTH THREE TIMES A DAY 90 tablet 4   •  clopidogrel (PLAVIX) 75 MG tablet Take 1 tablet by mouth Daily. 90 tablet 3   • Dapagliflozin-Metformin HCl ER (XIGDUO XR)  MG tablet sustained-release 24 hour Take  by mouth Daily.     • gabapentin (NEURONTIN) 400 MG capsule Take 2 tablets by mouth 2 (Two) Times a Day.     • HYDROcodone-acetaminophen (NORCO) 7.5-325 MG per tablet Take 1 tablet by mouth Every 6 (Six) Hours As Needed for Moderate Pain (4-6).     • losartan (COZAAR) 25 MG tablet Take 1 tablet by mouth Daily. 30 tablet 6   • metoprolol tartrate (LOPRESSOR) 25 MG tablet Take  by mouth 2 (Two) Times a Day.     • nitroglycerin (NITROSTAT) 0.4 MG SL tablet Place  under the tongue. prn     • OXcarbazepine (TRILEPTAL) 300 MG tablet Take 2 tablets by mouth 2 (Two) Times a Day. 120 tablet 11   • pantoprazole (PROTONIX) 40 MG EC tablet 2 (Two) Times a Day.     • promethazine (PHENERGAN) 25 MG tablet Take 25 mg by mouth Every 6 (Six) Hours As Needed for Nausea or Vomiting.     • tiZANidine (ZANAFLEX) 4 MG tablet Daily.     • TRESIBA FLEXTOUCH 100 UNIT/ML solution pen-injector injection Daily.     • Alirocumab 75 MG/ML solution pen-injector Inject 1 applicator under the skin into the appropriate area as directed Every 14 (Fourteen) Days. 2 pen 11   • [DISCONTINUED] Dulaglutide (TRULICITY) 1.5 MG/0.5ML solution pen-injector Inject  under the skin 1 (One) Time Per Week.     • [DISCONTINUED] omeprazole (priLOSEC) 40 MG capsule Take 40 mg by mouth 2 (Two) Times a Day.     • [DISCONTINUED] ranolazine (RANEXA) 500 MG 12 hr tablet Take 1 tablet by mouth 2 (Two) Times a Day. 60 tablet 6   • [DISCONTINUED] TiZANidine HCl (ZANAFLEX PO) Take 8 mg by mouth Every Night.       No facility-administered encounter medications on file as of 8/6/2018.        Sulfa antibiotics    Past Medical History:   Diagnosis Date   • Arthritis    • CAD (coronary artery disease)    • Chest tightness or pressure    • Complex partial seizure (CMS/HCC)    • Diabetes mellitus (CMS/HCC)    •  "Dyslipidemia    • FHx: migraine headaches    • GERD (gastroesophageal reflux disease)    • Hiatal hernia    • Hypercholesterolemia    • Hypertension    • Myocardial infarction    • JOSE on CPAP    • Stroke syndrome (CMS/HCC)    • Stroke syndrome (CMS/HCC)        Social History     Social History   • Marital status:      Spouse name: N/A   • Number of children: N/A   • Years of education: N/A     Occupational History   • Not on file.     Social History Main Topics   • Smoking status: Former Smoker   • Smokeless tobacco: Never Used   • Alcohol use No   • Drug use: Unknown   • Sexual activity: Not on file     Other Topics Concern   • Not on file     Social History Narrative   • No narrative on file       Family History   Problem Relation Age of Onset   • Heart disease Other    • Diabetes Other    • Cancer Other    • Stroke Other    • Coronary artery disease Mother    • Heart attack Mother    • Coronary artery disease Father        Review of Systems   Constitutional: Negative.    HENT: Negative.    Eyes: Positive for visual disturbance (wears glasses).   Respiratory: Positive for shortness of breath (none since ranexa was discontinues).    Cardiovascular: Negative.  Negative for chest pain, palpitations and leg swelling.   Gastrointestinal: Negative.    Endocrine: Negative.    Genitourinary: Negative.    Musculoskeletal: Positive for arthralgias, back pain and neck pain.   Skin: Negative.    Allergic/Immunologic: Negative.    Neurological: Negative.    Hematological: Bruises/bleeds easily (both).   Psychiatric/Behavioral: Negative.    All other systems reviewed and are negative.      Objective   Vitals:    08/06/18 0902   BP: 120/80   BP Location: Left arm   Patient Position: Sitting   Pulse: 66   SpO2: 97%   Weight: 96.7 kg (213 lb 3.2 oz)   Height: 170.2 cm (67\")      /80 (BP Location: Left arm, Patient Position: Sitting)   Pulse 66   Ht 170.2 cm (67\")   Wt 96.7 kg (213 lb 3.2 oz)   SpO2 97%   BMI " 33.39 kg/m²     Lab Results (most recent)     None          Physical Exam   Constitutional: She is oriented to person, place, and time. She appears well-developed and well-nourished. No distress.   HENT:   Head: Normocephalic and atraumatic.   Eyes: Pupils are equal, round, and reactive to light. EOM are normal.   Neck: No JVD present.   Cardiovascular: Normal rate, regular rhythm, normal heart sounds and intact distal pulses.  Exam reveals no gallop and no friction rub.    No murmur heard.  Pulmonary/Chest: Effort normal and breath sounds normal. No respiratory distress. She has no wheezes. She has no rales. She exhibits no tenderness.   Musculoskeletal: Normal range of motion. She exhibits no edema.   Neurological: She is alert and oriented to person, place, and time. No cranial nerve deficit.   Skin: Skin is warm and dry. No rash noted. No erythema. No pallor.   Psychiatric: She has a normal mood and affect. Her behavior is normal.   Nursing note and vitals reviewed.      Procedure   Procedures       Assessment/Plan     Problems Addressed this Visit        Cardiovascular and Mediastinum    Coronary artery disease involving native coronary artery of native heart without angina pectoris    Essential hypertension       Respiratory    Shortness of breath - Primary            Recommendation  1.  Patient is doing well.  Her symptoms of angina has resolved.  She has no ischemic symptoms or dysrhythmic symptoms.  2.  I would like to become more aggressive in regards to risk factor modification.  Despite being on Lipitor her LDL is approximately 120.  I would like to try to get praluent approved.  3.  Otherwise we will see patient back for follow-up in 6 months or sooner symptoms discussed.  Follow-up primary as scheduled            Patient's Body mass index is 33.39 kg/m². BMI is above normal parameters. Recommendations include: educational material.       Electronically signed by:

## 2018-08-07 ENCOUNTER — TELEPHONE (OUTPATIENT)
Dept: CARDIOLOGY | Facility: CLINIC | Age: 53
End: 2018-08-07

## 2018-08-07 NOTE — TELEPHONE ENCOUNTER
Cover [employer] Med[employer] showing Repatha as preferred product per health plan. Verbal order per Darryl Ocasio PA-C to send Repatha to [facility] instead of the Praluent. ADELITA Dumont

## 2018-08-10 ENCOUNTER — DOCUMENTATION (OUTPATIENT)
Dept: CARDIOLOGY | Facility: CLINIC | Age: 53
End: 2018-08-10

## 2018-08-10 NOTE — PROGRESS NOTES
SPOKE WITH JEWELL AT [facility] TO CHECK ON STATUS OF REPATHA PA. STATED JUST WAITING ON LAST OFF. NOTE AND RECENT LAB. BOTH FAXED TO HER TODAY. PH,LPN

## 2018-10-23 ENCOUNTER — OFFICE VISIT (OUTPATIENT)
Dept: NEUROSURGERY | Facility: CLINIC | Age: 53
End: 2018-10-23

## 2018-10-23 VITALS — BODY MASS INDEX: 33.74 KG/M2 | WEIGHT: 215 LBS | TEMPERATURE: 98 F | HEIGHT: 67 IN

## 2018-10-23 DIAGNOSIS — M51.36 DEGENERATIVE DISC DISEASE, LUMBAR: Primary | ICD-10-CM

## 2018-10-23 DIAGNOSIS — Z98.1 HISTORY OF LUMBAR FUSION: ICD-10-CM

## 2018-10-23 DIAGNOSIS — M54.16 LUMBAR RADICULOPATHY: ICD-10-CM

## 2018-10-23 PROBLEM — M51.369 DEGENERATIVE DISC DISEASE, LUMBAR: Status: ACTIVE | Noted: 2018-10-23

## 2018-10-23 PROCEDURE — 99214 OFFICE O/P EST MOD 30 MIN: CPT | Performed by: PHYSICIAN ASSISTANT

## 2018-10-23 RX ORDER — KETOROLAC TROMETHAMINE 10 MG/1
TABLET, FILM COATED ORAL
COMMUNITY
Start: 2018-09-27 | End: 2018-11-06 | Stop reason: HOSPADM

## 2018-10-23 RX ORDER — ORPHENADRINE CITRATE 100 MG/1
TABLET, EXTENDED RELEASE ORAL
COMMUNITY
Start: 2018-09-27 | End: 2020-06-04

## 2018-10-23 RX ORDER — FLUCONAZOLE 150 MG/1
TABLET ORAL
COMMUNITY
Start: 2018-10-02 | End: 2018-11-06 | Stop reason: HOSPADM

## 2018-10-23 RX ORDER — INFLUENZA A VIRUS A/MICHIGAN/45/2015 (H1N1) RECOMBINANT HEMAGGLUTININ ANTIGEN, INFLUENZA A VIRUS A/SINGAPORE/INFIMH-16-0019/2016 (H3N2) RECOMBINANT HEMAGGLUTININ ANTIGEN, INFLUENZA B VIRUS B/MARYLAND/15/2016 RECOMBINANT HEMAGGLUTININ ANTIGEN, AND INFLUENZA B VIRUS B/PHUKET/3073/2013 RECOMBINANT HEMAGGLUTININ ANTIGEN 45; 45; 45; 45 UG/.5ML; UG/.5ML; UG/.5ML; UG/.5ML
INJECTION INTRAMUSCULAR
COMMUNITY
Start: 2018-10-01 | End: 2019-02-06

## 2018-10-23 RX ORDER — GABAPENTIN 600 MG/1
TABLET ORAL 4 TIMES DAILY
COMMUNITY
Start: 2018-09-25

## 2018-10-23 NOTE — PROGRESS NOTES
Patient: Phyllis Iyer  : 1965  GENDER: female    Primary Care Provider: Jack Early MD    Requesting Provider: As above      History    Chief Complaint: low back pain with associated walking and standing intolerances    History of Present Illness: Mrs. Iyer is a 53-year-old retired , with a past medical history significant for diabetes type 2, heart attack, and 9 cardiac stents placed (currently on Plavix/ASA).  Patient underwent VNS placement in  by Dr. Palm for her generalized seizure disorder.  She additionally underwent a PLIF at L4-5 on 14 to correct her spondylolisthesis and high-grade stenosis at that level.  Patient did very well postoperatively until 1 year ago when she had an increase in her low back pain/symptoms.  She presented to her pain management specialist Dr. Randolph, who has administered ~5-6 epidural injections.  Injections are no longer providing therapeutic relief, last injection was effective for approximately 4-5 days.    Over the past 3 months, her low back symptoms have progressed to include numbness/tingling down the posterior aspect of her thigh, that wraps anteriorly to her knees.  Symptoms continue mid-shin down in a sock-like distribution bilaterally, affecting her great toes greater than her small toes.  Patient also endorses episodes of weakness, especially noting that her left knee might give out on her.  Symptoms improve with laying down, and are worse with walking.  She denies bowel or bladder dysfunction.  Patient has additionally been taking Norco 7.5's q 6 hours and Neurontin 400 mg BID without resolution in symptoms.  Patient has no other complaints at this time.    Review of Systems   Constitutional: Negative for activity change, appetite change, chills, diaphoresis, fatigue, fever and unexpected weight change.   HENT: Negative for congestion, dental problem, drooling, ear discharge, ear pain, facial swelling, hearing loss,  "mouth sores, nosebleeds, postnasal drip, rhinorrhea, sinus pressure, sneezing, sore throat, tinnitus, trouble swallowing and voice change.    Eyes: Negative for photophobia, pain, discharge, redness, itching and visual disturbance.   Respiratory: Negative for apnea, cough, choking, chest tightness, shortness of breath, wheezing and stridor.    Cardiovascular: Negative for chest pain, palpitations and leg swelling.   Gastrointestinal: Negative for abdominal distention, abdominal pain, anal bleeding, blood in stool, constipation, diarrhea, nausea, rectal pain and vomiting.   Endocrine: Negative for cold intolerance, heat intolerance, polydipsia, polyphagia and polyuria.   Genitourinary: Negative for decreased urine volume, difficulty urinating, dysuria, enuresis, flank pain, frequency, genital sores, hematuria and urgency.   Musculoskeletal: Positive for back pain. Negative for arthralgias, gait problem, joint swelling, myalgias, neck pain and neck stiffness.   Skin: Negative for color change, pallor, rash and wound.   Allergic/Immunologic: Negative for environmental allergies, food allergies and immunocompromised state.   Neurological: Positive for seizures and headaches. Negative for dizziness, tremors, syncope, facial asymmetry, speech difficulty, weakness, light-headedness and numbness.   Hematological: Negative for adenopathy. Does not bruise/bleed easily.   Psychiatric/Behavioral: Positive for decreased concentration and sleep disturbance. Negative for agitation, behavioral problems, confusion, dysphoric mood, hallucinations, self-injury and suicidal ideas. The patient is nervous/anxious. The patient is not hyperactive.    All other systems reviewed and are negative.      The patient's past medical history, past surgical history, family history, and social history have been reviewed at length in the electronic medical record.    Physical Exam:   Temp 98 °F (36.7 °C)   Ht 170.2 cm (67.01\")   Wt 97.5 kg (215 lb) "   BMI 33.67 kg/m²   CONSTITUTIONAL:   - Patient is well-nourished  - Pleasant and appears stated age.  CV:   - Regular rate and rhythm on palpable radial pulse   - No murmur appreciated   PULMONARY:   - Speaking in full sentences  - No use of accessory muscles   - Breathing is non-labored   - No wheezing   MUSCULOSKELETAL:  - Neck tenderness to palpation is not observed.   - ROM in neck is normal.  NEUROLOGICAL:  - A&Ox3  - Attention span, language function, and cognition are intact.  - Strength is intact in the upper and lower extremities to direct testing.  - Muscle tone is normal throughout.  - Station and gait are normal.  - Sensation is intact to light touch testing throughout.  - Deep tendon reflexes are 1+ and symmetrical.    - Srinath's Sign is negative bilaterally.  - No clonus is elicited.  - Coordination is intact.    Medical Decision Making      Diagnosis/Treatment Options:  1. Degenerative disc disease, lumbar  2. History of lumbar fusion  3. Lumbar radiculopathy       Follow up:    Mrs. Iyer is seen today with recurrent low back symptoms that have progressed over the last 3 months.  Given her history of PLIF at L4-5, and her new complaints of knee pain/weakness and great toe numbness/tingling bilaterally, I suspect that she might be symptomatic at the level above and below her current fusion.  I have ordered plain films of the lumbar spine with flexion/extension views, as well as a CT myelogram of the lumbar spine (given that she cannot have a MRI due to her VNS).  Patient will follow-up in the office after obtaining the studies for further review.    Isa Bello PA-C   10/23/2018   3:23 PM

## 2018-10-24 ENCOUNTER — TELEPHONE (OUTPATIENT)
Dept: CARDIOLOGY | Facility: CLINIC | Age: 53
End: 2018-10-24

## 2018-10-24 NOTE — TELEPHONE ENCOUNTER
Kenna from [facility] Called req clearance for patient to hold blood thinner prior to procedure.   I called back number left on voice mail at [phone] and requested they fax cardiac clearance req to our office for provider to review. -ROS;ADELITA

## 2018-10-25 NOTE — TELEPHONE ENCOUNTER
Cardiac clearance req was received in office from  [facility] . This was reviewed by GAVIN Castillo sent back. This letter is also within EPIC. -;[facility]

## 2018-11-06 ENCOUNTER — HOSPITAL ENCOUNTER (OUTPATIENT)
Dept: CT IMAGING | Facility: HOSPITAL | Age: 53
Discharge: HOME | End: 2018-11-06

## 2018-11-06 ENCOUNTER — HOSPITAL ENCOUNTER (OUTPATIENT)
Dept: GENERAL RADIOLOGY | Facility: HOSPITAL | Age: 53
Discharge: HOME | End: 2018-11-06
Admitting: NEUROLOGICAL SURGERY

## 2018-11-06 ENCOUNTER — HOSPITAL ENCOUNTER (OUTPATIENT)
Dept: GENERAL RADIOLOGY | Facility: HOSPITAL | Age: 53
Discharge: HOME | End: 2018-11-06

## 2018-11-06 VITALS
RESPIRATION RATE: 18 BRPM | OXYGEN SATURATION: 96 % | HEIGHT: 67 IN | DIASTOLIC BLOOD PRESSURE: 56 MMHG | HEART RATE: 69 BPM | SYSTOLIC BLOOD PRESSURE: 131 MMHG | BODY MASS INDEX: 33.74 KG/M2 | WEIGHT: 215 LBS | TEMPERATURE: 97.6 F

## 2018-11-06 DIAGNOSIS — Z98.1 HISTORY OF LUMBAR FUSION: ICD-10-CM

## 2018-11-06 LAB — GLUCOSE BLDC GLUCOMTR-MCNC: 103 MG/DL (ref 70–130)

## 2018-11-06 PROCEDURE — 72240 MYELOGRAPHY NECK SPINE: CPT

## 2018-11-06 PROCEDURE — 82962 GLUCOSE BLOOD TEST: CPT

## 2018-11-06 PROCEDURE — 72120 X-RAY BEND ONLY L-S SPINE: CPT

## 2018-11-06 PROCEDURE — 72132 CT LUMBAR SPINE W/DYE: CPT

## 2018-11-06 PROCEDURE — 62304 MYELOGRAPHY LUMBAR INJECTION: CPT

## 2018-11-06 PROCEDURE — 0 IOPAMIDOL 41 % SOLUTION

## 2018-11-06 RX ORDER — PROMETHAZINE HYDROCHLORIDE 25 MG/1
25 TABLET ORAL ONCE AS NEEDED
Status: DISCONTINUED | OUTPATIENT
Start: 2018-11-06 | End: 2018-11-07 | Stop reason: HOSPADM

## 2018-11-06 RX ORDER — ONDANSETRON 4 MG/1
4 TABLET, FILM COATED ORAL ONCE AS NEEDED
Status: DISCONTINUED | OUTPATIENT
Start: 2018-11-06 | End: 2018-11-07 | Stop reason: HOSPADM

## 2018-11-06 RX ORDER — PROMETHAZINE HYDROCHLORIDE 25 MG/ML
25 INJECTION, SOLUTION INTRAMUSCULAR; INTRAVENOUS ONCE AS NEEDED
Status: DISCONTINUED | OUTPATIENT
Start: 2018-11-06 | End: 2018-11-07 | Stop reason: HOSPADM

## 2018-11-06 NOTE — POST-PROCEDURE NOTE
MYELOGRAM PROCEDURE NOTE  Neurosurgery    Patient: Phyllis Iyer  : 1965      PreOp Diagnosis: Lumbar stenosis    PostOp Diagnosis: Same    Procedure: Lumbar myelogram    Surgeon: Néstor    Anesthesia: 1% lidocaine    Technique:   Spinal needle: 22 gauge   Contrast: Isovue 200 (20ml)   Injection site: Midline L5    EBL: Trace    Specimens removed: None    Complication: None        Renato Palm MD  18  7:21 AM

## 2018-11-07 ENCOUNTER — TELEPHONE (OUTPATIENT)
Dept: INFUSION THERAPY | Facility: HOSPITAL | Age: 53
End: 2018-11-07

## 2018-11-07 DIAGNOSIS — I10 ESSENTIAL HYPERTENSION: ICD-10-CM

## 2018-11-07 RX ORDER — CLONIDINE HYDROCHLORIDE 0.1 MG/1
TABLET ORAL
Qty: 90 TABLET | Refills: 5 | Status: SHIPPED | OUTPATIENT
Start: 2018-11-07 | End: 2019-08-15 | Stop reason: SDUPTHER

## 2018-11-08 ENCOUNTER — TELEPHONE (OUTPATIENT)
Dept: NEUROSURGERY | Facility: CLINIC | Age: 53
End: 2018-11-08

## 2018-11-08 NOTE — TELEPHONE ENCOUNTER
Provider:  Néstor  Caller: patient  Time of call:  8:40   Phone #:  [phone]  Surgery:  Myelogram  Surgery Date:  11-06-18  Last visit:   10/23/18  Next visit: 11/20/18    WESLY:         Reason for call:     Patient states that for the last 2 days she has had increased low back pain.  She said it doesn't have anything to do with the myelogram.  She is taking Norco and a muscle relaxer at bedtime.  She said the pain is unbearable.  She wants to know if she can be seen sooner than 11/20/18.  She has pain that shoots down both lower extremities, especially around the knees, then into her great toes.  She is not able to lay down without pain.  Sitting is better.

## 2018-11-14 NOTE — TELEPHONE ENCOUNTER
Due to surgery cancellations on Dr. Palm's schedule we are unable to move up her appointment at this time but she could call back and see if we have any cancellations to move her sooner.

## 2018-11-20 ENCOUNTER — OFFICE VISIT (OUTPATIENT)
Dept: NEUROSURGERY | Facility: CLINIC | Age: 53
End: 2018-11-20

## 2018-11-20 VITALS — BODY MASS INDEX: 33.93 KG/M2 | HEIGHT: 67 IN | RESPIRATION RATE: 19 BRPM | WEIGHT: 216.2 LBS | TEMPERATURE: 98.4 F

## 2018-11-20 DIAGNOSIS — M54.9 MECHANICAL BACK PAIN: Primary | ICD-10-CM

## 2018-11-20 DIAGNOSIS — Z98.1 HISTORY OF LUMBAR FUSION: ICD-10-CM

## 2018-11-20 DIAGNOSIS — M51.36 DEGENERATIVE DISC DISEASE, LUMBAR: ICD-10-CM

## 2018-11-20 DIAGNOSIS — M48.061 SPINAL STENOSIS OF LUMBAR REGION WITHOUT NEUROGENIC CLAUDICATION: ICD-10-CM

## 2018-11-20 PROCEDURE — 99213 OFFICE O/P EST LOW 20 MIN: CPT | Performed by: NEUROLOGICAL SURGERY

## 2018-11-20 NOTE — PROGRESS NOTES
Patient: Phyllis Iyer  : 1965    Primary Care Provider: Jack Early MD    Requesting Provider: As above        History    Chief Complaint: Low back pain and bilateral lower extremity pain.    History of Present Illness: Ms. Iyer is a 53-year-old retired , with a past medical history significant for diabetes type 2, heart attack, and 9 cardiac stents placed (currently on Plavix/ASA).  Patient underwent VNS placement in  for her generalized seizure disorder.  She additionally underwent a PLIF at L4-5 on 14 to correct her spondylolisthesis and high-grade stenosis at that level.  Patient did very well postoperatively until 1 year ago when she had an increase in her low back pain/symptoms.  She presented to her pain management specialist Dr. Randolph, who has administered ~5-6 epidural injections.  Injections are no longer providing therapeutic relief and her last injection was effective for approximately 4-5 days.  The pain is in her back and will extend in the back of her thighs and then wraparound to her knees.  Vaguely it extends more distally.  She does report having a peripheral neuropathy related to her diabetes however.  She has to frequently change position.  Comfortable.  She cannot tolerate protracted standing/walking, or even sitting        Review of Systems   Constitutional: Negative for activity change, appetite change, chills, diaphoresis, fatigue, fever and unexpected weight change.   HENT: Positive for sinus pressure. Negative for congestion, dental problem, drooling, ear discharge, ear pain, facial swelling, hearing loss, mouth sores, nosebleeds, postnasal drip, rhinorrhea, sneezing, sore throat, tinnitus, trouble swallowing and voice change.    Eyes: Negative for photophobia, pain, discharge, redness, itching and visual disturbance.   Respiratory: Positive for apnea and shortness of breath. Negative for cough, choking, chest tightness, wheezing and stridor.   "  Cardiovascular: Negative for chest pain, palpitations and leg swelling.   Gastrointestinal: Negative for abdominal distention, abdominal pain, anal bleeding, blood in stool, constipation, diarrhea, nausea, rectal pain and vomiting.   Endocrine: Positive for polydipsia. Negative for cold intolerance, heat intolerance, polyphagia and polyuria.   Genitourinary: Negative for decreased urine volume, difficulty urinating, dysuria, enuresis, flank pain, frequency, genital sores, hematuria and urgency.   Musculoskeletal: Positive for back pain. Negative for arthralgias, gait problem, joint swelling, myalgias, neck pain and neck stiffness.   Skin: Negative for color change, pallor, rash and wound.   Allergic/Immunologic: Negative for environmental allergies, food allergies and immunocompromised state.   Neurological: Positive for seizures. Negative for dizziness, tremors, syncope, facial asymmetry, speech difficulty, weakness, light-headedness, numbness and headaches.   Hematological: Negative for adenopathy. Does not bruise/bleed easily.   Psychiatric/Behavioral: Negative for agitation, behavioral problems, confusion, decreased concentration, dysphoric mood, hallucinations, self-injury, sleep disturbance and suicidal ideas. The patient is not nervous/anxious and is not hyperactive.    All other systems reviewed and are negative.      The patient's past medical history, past surgical history, family history, and social history have been reviewed at length in the electronic medical record.    Physical Exam:   Temp 98.4 °F (36.9 °C) (Temporal)   Resp 19   Ht 170.2 cm (67\")   Wt 98.1 kg (216 lb 3.2 oz)   BMI 33.86 kg/m²   MUSCULOSKELETAL:  Straight leg raising is negative.  Domingo's Sign is negative.  ROM in back is normal.  Tenderness in the back to palpation is not observed.  NEUROLOGICAL:  Strength is intact in the lower extremities to direct testing.  Muscle tone is normal throughout.  Station and gait are " normal.  Sensation is intact to light touch testing throughout.      Medical Decision Making    Data Review:   CT myelogram reveals changes related to a prior L4-5 fusion.  There is moderate fusion mass interbody at L4-5.  There is some very mild narrowing at L3-4 noted on the upright images but this is subtle.  High-grade canal compromise is not noted.  I don't see any evidence of instability on the flexion extension imaging.    Diagnosis:   For the most part I think the patient is afflicted with mechanical back pain.  I don't see evidence of symptomatic spinal stenosis or instability.    Treatment Options:   Currently, I don't see a role for surgical intervention and treatment will need to remain symptomatic.  She will follow-up on an as-needed basis.       Diagnosis Plan   1. Mechanical back pain     2. Spinal stenosis of lumbar region without neurogenic claudication     3. Degenerative disc disease, lumbar     4. History of lumbar fusion         Scribed for Renato Palm MD by Meg Santillan CMA on 11/20/2018 2:50 PM       I, Dr. Palm, personally performed the services described in the documentation, as scribed in my presence, and it is both accurate and complete.

## 2018-12-20 RX ORDER — OXCARBAZEPINE 300 MG/1
TABLET, FILM COATED ORAL
Qty: 120 TABLET | Refills: 10 | OUTPATIENT
Start: 2018-12-20

## 2018-12-26 RX ORDER — OXCARBAZEPINE 300 MG/1
TABLET, FILM COATED ORAL
Qty: 120 TABLET | Refills: 0 | Status: SHIPPED | OUTPATIENT
Start: 2018-12-26 | End: 2019-03-06 | Stop reason: SDUPTHER

## 2019-01-21 RX ORDER — LOSARTAN POTASSIUM 25 MG/1
TABLET ORAL
Qty: 30 TABLET | Refills: 5 | Status: SHIPPED | OUTPATIENT
Start: 2019-01-21 | End: 2020-06-04

## 2019-02-06 ENCOUNTER — LAB (OUTPATIENT)
Dept: LAB | Facility: HOSPITAL | Age: 54
End: 2019-02-06

## 2019-02-06 ENCOUNTER — OFFICE VISIT (OUTPATIENT)
Dept: CARDIOLOGY | Facility: CLINIC | Age: 54
End: 2019-02-06

## 2019-02-06 ENCOUNTER — TELEPHONE (OUTPATIENT)
Dept: CARDIOLOGY | Facility: CLINIC | Age: 54
End: 2019-02-06

## 2019-02-06 VITALS
SYSTOLIC BLOOD PRESSURE: 125 MMHG | DIASTOLIC BLOOD PRESSURE: 86 MMHG | OXYGEN SATURATION: 97 % | HEART RATE: 71 BPM | BODY MASS INDEX: 34.28 KG/M2 | HEIGHT: 67 IN | WEIGHT: 218.4 LBS

## 2019-02-06 DIAGNOSIS — E11.69 DIABETES MELLITUS TYPE 2 IN OBESE (HCC): ICD-10-CM

## 2019-02-06 DIAGNOSIS — E78.00 PURE HYPERCHOLESTEROLEMIA: ICD-10-CM

## 2019-02-06 DIAGNOSIS — I25.10 CORONARY ARTERY DISEASE INVOLVING NATIVE CORONARY ARTERY OF NATIVE HEART WITHOUT ANGINA PECTORIS: ICD-10-CM

## 2019-02-06 DIAGNOSIS — R53.82 CHRONIC FATIGUE: ICD-10-CM

## 2019-02-06 DIAGNOSIS — E66.9 DIABETES MELLITUS TYPE 2 IN OBESE (HCC): ICD-10-CM

## 2019-02-06 DIAGNOSIS — R06.02 SOB (SHORTNESS OF BREATH): ICD-10-CM

## 2019-02-06 DIAGNOSIS — R06.02 SOB (SHORTNESS OF BREATH): Primary | ICD-10-CM

## 2019-02-06 DIAGNOSIS — F51.01 PRIMARY INSOMNIA: ICD-10-CM

## 2019-02-06 LAB
ALBUMIN SERPL-MCNC: 4.5 G/DL (ref 3.5–5)
ALBUMIN/GLOB SERPL: 1.9 G/DL (ref 1.5–2.5)
ALP SERPL-CCNC: 186 U/L (ref 35–104)
ALT SERPL W P-5'-P-CCNC: 37 U/L (ref 10–36)
ANION GAP SERPL CALCULATED.3IONS-SCNC: 7.2 MMOL/L (ref 3.6–11.2)
AST SERPL-CCNC: 17 U/L (ref 10–30)
BASOPHILS # BLD AUTO: 0.06 10*3/MM3 (ref 0–0.3)
BASOPHILS NFR BLD AUTO: 0.6 % (ref 0–2)
BILIRUB SERPL-MCNC: 0.4 MG/DL (ref 0.2–1.8)
BUN BLD-MCNC: 11 MG/DL (ref 7–21)
BUN/CREAT SERPL: 14.9 (ref 7–25)
CALCIUM SPEC-SCNC: 8.9 MG/DL (ref 7.7–10)
CHLORIDE SERPL-SCNC: 106 MMOL/L (ref 99–112)
CHOLEST SERPL-MCNC: 170 MG/DL (ref 0–200)
CO2 SERPL-SCNC: 24.8 MMOL/L (ref 24.3–31.9)
CREAT BLD-MCNC: 0.74 MG/DL (ref 0.43–1.29)
DEPRECATED RDW RBC AUTO: 40.9 FL (ref 37–54)
EOSINOPHIL # BLD AUTO: 0.3 10*3/MM3 (ref 0–0.7)
EOSINOPHIL NFR BLD AUTO: 3.2 % (ref 0–5)
ERYTHROCYTE [DISTWIDTH] IN BLOOD BY AUTOMATED COUNT: 13.9 % (ref 11.5–14.5)
GFR SERPL CREATININE-BSD FRML MDRD: 81 ML/MIN/1.73
GLOBULIN UR ELPH-MCNC: 2.4 GM/DL
GLUCOSE BLD-MCNC: 237 MG/DL (ref 70–110)
HBA1C MFR BLD: 8.9 % (ref 4.5–5.7)
HCT VFR BLD AUTO: 41.1 % (ref 37–47)
HDLC SERPL-MCNC: 43 MG/DL (ref 60–100)
HGB BLD-MCNC: 13.6 G/DL (ref 12–16)
IMM GRANULOCYTES # BLD AUTO: 0.04 10*3/MM3 (ref 0–0.03)
IMM GRANULOCYTES NFR BLD AUTO: 0.4 % (ref 0–0.5)
LDLC SERPL CALC-MCNC: 104 MG/DL (ref 0–100)
LDLC/HDLC SERPL: 2.42 {RATIO}
LYMPHOCYTES # BLD AUTO: 3.75 10*3/MM3 (ref 1–3)
LYMPHOCYTES NFR BLD AUTO: 39.8 % (ref 21–51)
MCH RBC QN AUTO: 27.6 PG (ref 27–33)
MCHC RBC AUTO-ENTMCNC: 33.1 G/DL (ref 33–37)
MCV RBC AUTO: 83.4 FL (ref 80–94)
MONOCYTES # BLD AUTO: 0.6 10*3/MM3 (ref 0.1–0.9)
MONOCYTES NFR BLD AUTO: 6.4 % (ref 0–10)
NEUTROPHILS # BLD AUTO: 4.68 10*3/MM3 (ref 1.4–6.5)
NEUTROPHILS NFR BLD AUTO: 49.6 % (ref 30–70)
OSMOLALITY SERPL CALC.SUM OF ELEC: 282.8 MOSM/KG (ref 273–305)
PLATELET # BLD AUTO: 350 10*3/MM3 (ref 130–400)
PMV BLD AUTO: 10 FL (ref 6–10)
POTASSIUM BLD-SCNC: 3.7 MMOL/L (ref 3.5–5.3)
PROT SERPL-MCNC: 6.9 G/DL (ref 6–8)
RBC # BLD AUTO: 4.93 10*6/MM3 (ref 4.2–5.4)
SODIUM BLD-SCNC: 138 MMOL/L (ref 135–153)
TRIGL SERPL-MCNC: 115 MG/DL (ref 0–150)
TSH SERPL DL<=0.05 MIU/L-ACNC: 2.67 MIU/ML (ref 0.55–4.78)
VLDLC SERPL-MCNC: 23 MG/DL
WBC NRBC COR # BLD: 9.43 10*3/MM3 (ref 4.5–12.5)

## 2019-02-06 PROCEDURE — 36415 COLL VENOUS BLD VENIPUNCTURE: CPT

## 2019-02-06 PROCEDURE — 80053 COMPREHEN METABOLIC PANEL: CPT | Performed by: PHYSICIAN ASSISTANT

## 2019-02-06 PROCEDURE — 80061 LIPID PANEL: CPT | Performed by: PHYSICIAN ASSISTANT

## 2019-02-06 PROCEDURE — 84443 ASSAY THYROID STIM HORMONE: CPT | Performed by: PHYSICIAN ASSISTANT

## 2019-02-06 PROCEDURE — 85025 COMPLETE CBC W/AUTO DIFF WBC: CPT | Performed by: PHYSICIAN ASSISTANT

## 2019-02-06 PROCEDURE — 93000 ELECTROCARDIOGRAM COMPLETE: CPT | Performed by: PHYSICIAN ASSISTANT

## 2019-02-06 PROCEDURE — 82652 VIT D 1 25-DIHYDROXY: CPT | Performed by: PHYSICIAN ASSISTANT

## 2019-02-06 PROCEDURE — 83036 HEMOGLOBIN GLYCOSYLATED A1C: CPT | Performed by: PHYSICIAN ASSISTANT

## 2019-02-06 PROCEDURE — 99214 OFFICE O/P EST MOD 30 MIN: CPT | Performed by: PHYSICIAN ASSISTANT

## 2019-02-06 RX ORDER — TRAZODONE HYDROCHLORIDE 150 MG/1
150 TABLET ORAL NIGHTLY
Qty: 30 TABLET | Refills: 6 | Status: SHIPPED | OUTPATIENT
Start: 2019-02-06 | End: 2019-09-27 | Stop reason: SDUPTHER

## 2019-02-06 NOTE — TELEPHONE ENCOUNTER
Labs faxed to Dr Early. Julia Peters MA      ----- Message from LUCY Kern sent at 2/6/2019  3:28 PM EST -----  Can we fax labs including diabetic labs to primary

## 2019-02-06 NOTE — PROGRESS NOTES
Problem list     Subjective   Phyllis Iyer is a 53 y.o. female     Chief Complaint   Patient presents with   • Follow-up     presents for 6 month f/u   • Shortness of Breath   • Coronary Artery Disease       HPI        Problem list:     1. CAD  1.1 cardiac catheterization in 2011 with stenting to the mid LAD as well as proximal, mid, and distal RCA with medical management recommended  1.2 left heart catheterization January 2016 with stenting of the right coronary artery with nonobstructive disease otherwise  1.3 stress test November 2016 but no evidence ischemia and preserved LV function  1.4 cardiac catheterization 5/4/2018 by Dr. Malik because of non-ST elevation myocardial infarction demonstrating high-grade RCA disease status post stenting ×2.  60% IntraStent restenosis of the LAD with medical management recommended.  Normal LV function noted  1.5 cardiac catheterization July 2018 with stenting of the LAD because of refractory angina.  2.  Preserved systolic function  3.  Hypertension  4.  Dyslipidemia  5.  Diabetes mellitus  6.  Obstructive sleep apnea noncompliant with CPAP therapy    Patient is a 53-year-old female that presents to the office today for follow-up.  She is doing well.  She has no chest pain or pressure.  She has mild levels of dyspnea baseline but this is not progressing in any way.  No PND orthopnea.    She doesn't palpitate or have dysrhythmic symptoms.  She does complain of insomnia.  She describes being on amitriptyline for years and that has not helped in regards to her insomnia.  Otherwise she is doing well      Outpatient Encounter Medications as of 2/6/2019   Medication Sig Dispense Refill   • aspirin 81 MG tablet Take 1 tablet by mouth Daily. 30 tablet 11   • atorvastatin (LIPITOR) 80 MG tablet TAKE ONE TABLET BY MOUTH DAILY 30 tablet 10   • CloNIDine (CATAPRES) 0.1 MG tablet TAKE ONE TABLET BY MOUTH THREE TIMES A DAY 90 tablet 5   • clopidogrel (PLAVIX) 75 MG tablet Take 1 tablet  by mouth Daily. 90 tablet 3   • Dapagliflozin-Metformin HCl ER (XIGDUO XR)  MG tablet sustained-release 24 hour Take  by mouth Daily.     • Evolocumab 140 MG/ML solution auto-injector Inject 1 mL under the skin into the appropriate area as directed Every 14 (Fourteen) Days. 2 pen 11   • FLECTOR 1.3 % patch patch      • gabapentin (NEURONTIN) 600 MG tablet      • HYDROcodone-acetaminophen (NORCO) 7.5-325 MG per tablet Take 1 tablet by mouth Every 6 (Six) Hours As Needed for Moderate Pain (4-6).     • losartan (COZAAR) 25 MG tablet TAKE ONE TABLET BY MOUTH DAILY 30 tablet 5   • metoprolol tartrate (LOPRESSOR) 25 MG tablet Take  by mouth 2 (Two) Times a Day.     • nitroglycerin (NITROSTAT) 0.4 MG SL tablet Place  under the tongue. prn     • orphenadrine (NORFLEX) 100 MG 12 hr tablet      • OXcarbazepine (TRILEPTAL) 300 MG tablet TAKE TWO TABLETS BY MOUTH TWICE A  tablet 0   • pantoprazole (PROTONIX) 40 MG EC tablet 2 (Two) Times a Day.     • promethazine (PHENERGAN) 25 MG tablet Take 25 mg by mouth Every 6 (Six) Hours As Needed for Nausea or Vomiting.     • TRESIBA FLEXTOUCH 100 UNIT/ML solution pen-injector injection Daily.     • [DISCONTINUED] amitriptyline (ELAVIL) 25 MG tablet Take 25 mg by mouth Every Night.     • traZODone (DESYREL) 150 MG tablet Take 1 tablet by mouth Every Night. 30 tablet 6   • [DISCONTINUED] FLUBLOK QUADRIVALENT 0.5 ML solution prefilled syringe IM suspension      • [DISCONTINUED] gabapentin (NEURONTIN) 400 MG capsule Take 2 tablets by mouth 2 (Two) Times a Day.     • [DISCONTINUED] tiZANidine (ZANAFLEX) 4 MG tablet Daily.       No facility-administered encounter medications on file as of 2/6/2019.        Sulfa antibiotics    Past Medical History:   Diagnosis Date   • Arthritis    • CAD (coronary artery disease)    • Chest tightness or pressure    • Complex partial seizure (CMS/HCC)    • Diabetes mellitus (CMS/HCC)    • Dyslipidemia    • FHx: migraine headaches    • GERD  (gastroesophageal reflux disease)    • Hiatal hernia    • Hypercholesterolemia    • Hypertension    • Myocardial infarction (CMS/HCC)    • JOSE on CPAP    • Stroke syndrome    • Stroke syndrome        Social History     Socioeconomic History   • Marital status:      Spouse name: Not on file   • Number of children: Not on file   • Years of education: Not on file   • Highest education level: Not on file   Social Needs   • Financial resource strain: Not on file   • Food insecurity - worry: Not on file   • Food insecurity - inability: Not on file   • Transportation needs - medical: Not on file   • Transportation needs - non-medical: Not on file   Occupational History   • Not on file   Tobacco Use   • Smoking status: Former Smoker   • Smokeless tobacco: Never Used   Substance and Sexual Activity   • Alcohol use: No   • Drug use: Not on file   • Sexual activity: Not on file   Other Topics Concern   • Not on file   Social History Narrative   • Not on file       Family History   Problem Relation Age of Onset   • Heart disease Other    • Diabetes Other    • Cancer Other    • Stroke Other    • Coronary artery disease Mother    • Heart attack Mother    • Coronary artery disease Father        Review of Systems   Constitutional: Positive for fatigue.   HENT:        Sinus issues   Eyes: Positive for visual disturbance (wears glasses).   Respiratory: Positive for apnea (cpap) and shortness of breath (with daily activity).    Cardiovascular: Negative.  Negative for chest pain, palpitations and leg swelling.   Gastrointestinal: Negative.    Endocrine: Negative.    Musculoskeletal: Positive for arthralgias, back pain, myalgias and neck pain.   Skin: Negative.    Allergic/Immunologic: Positive for environmental allergies.   Neurological: Positive for numbness (neuropathy ).   Hematological: Bruises/bleeds easily.   Psychiatric/Behavioral: Positive for agitation and sleep disturbance (trouble falling to sleep). The patient is  "nervous/anxious.    All other systems reviewed and are negative.      Objective   Vitals:    02/06/19 0853   BP: 125/86   BP Location: Left arm   Patient Position: Sitting   Pulse: 71   SpO2: 97%   Weight: 99.1 kg (218 lb 6.4 oz)   Height: 170.2 cm (67.01\")      /86 (BP Location: Left arm, Patient Position: Sitting)   Pulse 71   Ht 170.2 cm (67.01\")   Wt 99.1 kg (218 lb 6.4 oz)   SpO2 97%   BMI 34.20 kg/m²     Lab Results (most recent)     None          Physical Exam   Constitutional: She is oriented to person, place, and time. She appears well-developed and well-nourished. No distress.   HENT:   Head: Normocephalic and atraumatic.   Eyes: EOM are normal. Pupils are equal, round, and reactive to light.   Neck: No JVD present.   Cardiovascular: Normal rate, regular rhythm, normal heart sounds and intact distal pulses. Exam reveals no gallop and no friction rub.   No murmur heard.  Pulmonary/Chest: Effort normal and breath sounds normal. No respiratory distress. She has no wheezes. She has no rales. She exhibits no tenderness.   Musculoskeletal: Normal range of motion. She exhibits no edema.   Neurological: She is alert and oriented to person, place, and time. No cranial nerve deficit.   Skin: Skin is warm and dry. No rash noted. No erythema. No pallor.   Psychiatric: She has a normal mood and affect. Her behavior is normal.   Nursing note and vitals reviewed.      Procedure     ECG 12 Lead  Date/Time: 2/6/2019 8:59 AM  Performed by: Darryl Ocasio PA  Authorized by: Darryl Ocasio PA   Comments: EKG demonstrates sinus rhythm at 71 bpm, no acute ST changes               Assessment/Plan     Problems Addressed this Visit        Cardiovascular and Mediastinum    Coronary artery disease involving native coronary artery of native heart without angina pectoris    Relevant Orders    Comprehensive Metabolic Panel    Lipid Panel    Hemoglobin A1c    Vitamin D 1,25 Dihydroxy    CBC & Differential    TSH    " Pure hypercholesterolemia    Relevant Orders    Comprehensive Metabolic Panel    Lipid Panel    Hemoglobin A1c    Vitamin D 1,25 Dihydroxy    CBC & Differential    TSH       Respiratory    SOB (shortness of breath) - Primary    Relevant Orders    ECG 12 Lead    Comprehensive Metabolic Panel    Lipid Panel    Hemoglobin A1c    Vitamin D 1,25 Dihydroxy    CBC & Differential    TSH       Endocrine    Diabetes mellitus type 2 in obese (CMS/HCC)    Relevant Orders    Comprehensive Metabolic Panel    Lipid Panel    Hemoglobin A1c    Vitamin D 1,25 Dihydroxy    CBC & Differential    TSH       Other    Chronic fatigue    Relevant Orders    Comprehensive Metabolic Panel    Lipid Panel    Hemoglobin A1c    Vitamin D 1,25 Dihydroxy    CBC & Differential    TSH    Primary insomnia            Recommendation  1.  Patient with history of coronary disease without symptoms of angina, failure, or arrhythmia.  We will continue medical management.  2.  Would like to recheck lipid parameters.  Patient describes needing routine blood work performed.  She has type 2 diabetes and history of chronic fatigue.  I will check routine laboratories and make them available to her primary per patient request.  3.  Patient would like something to help her sleep.  I will prescribe trazodone and discontinue her amitriptyline.  She will follow up further with her primary in regards to insomnia.  4.  We will see her back for follow-up after laboratories.  She'll follow-up primary as scheduled              Patient's Body mass index is 34.2 kg/m². BMI is above normal parameters. Recommendations include: educational material.       Electronically signed by:

## 2019-02-07 ENCOUNTER — TELEPHONE (OUTPATIENT)
Dept: CARDIOLOGY | Facility: CLINIC | Age: 54
End: 2019-02-07

## 2019-02-07 NOTE — TELEPHONE ENCOUNTER
Notified patient of lab results. Verbal order per Darryl Ocasio PA-C to discontinue Repatha 140 mg and start patient on Repatha 420mg monthly. Chart notes, labs and RX sent to [facility]. Julia Peters MA

## 2019-02-08 LAB — 1,25(OH)2D3 SERPL-MCNC: 74.8 PG/ML (ref 19.9–79.3)

## 2019-02-13 ENCOUNTER — PRIOR AUTHORIZATION (OUTPATIENT)
Dept: CARDIOLOGY | Facility: CLINIC | Age: 54
End: 2019-02-13

## 2019-02-13 NOTE — TELEPHONE ENCOUNTER
Repatha 420mg approved by patient's insurance. Copay $5 with using copay card. Julia Peters MA

## 2019-02-25 RX ORDER — OXCARBAZEPINE 300 MG/1
TABLET, FILM COATED ORAL
Qty: 120 TABLET | Refills: 0 | OUTPATIENT
Start: 2019-02-25

## 2019-03-06 RX ORDER — OXCARBAZEPINE 300 MG/1
600 TABLET, FILM COATED ORAL 2 TIMES DAILY
Qty: 28 TABLET | Refills: 0 | Status: SHIPPED | OUTPATIENT
Start: 2019-03-06 | End: 2019-03-12 | Stop reason: SDUPTHER

## 2019-03-06 NOTE — TELEPHONE ENCOUNTER
Patient called, gave her a week supply worth of meds for seizures to get her to her appointment. Patient is aware that she will need to be seen to receive anymore.

## 2019-03-12 ENCOUNTER — OFFICE VISIT (OUTPATIENT)
Dept: NEUROLOGY | Facility: CLINIC | Age: 54
End: 2019-03-12

## 2019-03-12 VITALS
HEART RATE: 74 BPM | WEIGHT: 220 LBS | OXYGEN SATURATION: 97 % | SYSTOLIC BLOOD PRESSURE: 126 MMHG | RESPIRATION RATE: 18 BRPM | HEIGHT: 67 IN | BODY MASS INDEX: 34.53 KG/M2 | DIASTOLIC BLOOD PRESSURE: 72 MMHG

## 2019-03-12 DIAGNOSIS — G40.019 PARTIAL IDIOPATHIC EPILEPSY WITH SEIZURES OF LOCALIZED ONSET, INTRACTABLE, WITHOUT STATUS EPILEPTICUS (HCC): ICD-10-CM

## 2019-03-12 DIAGNOSIS — Z96.89 STATUS POST VNS (VAGUS NERVE STIMULATOR) PLACEMENT: Primary | ICD-10-CM

## 2019-03-12 PROCEDURE — 95976 ALYS SMPL CN NPGT PRGRMG: CPT | Performed by: PSYCHIATRY & NEUROLOGY

## 2019-03-12 PROCEDURE — 99213 OFFICE O/P EST LOW 20 MIN: CPT | Performed by: PSYCHIATRY & NEUROLOGY

## 2019-03-12 RX ORDER — OXCARBAZEPINE 600 MG/1
600 TABLET, FILM COATED ORAL 2 TIMES DAILY
Qty: 60 TABLET | Refills: 11 | Status: SHIPPED | OUTPATIENT
Start: 2019-03-12 | End: 2020-04-02 | Stop reason: SDUPTHER

## 2019-03-12 NOTE — PROGRESS NOTES
Subjective   Patient ID: Phyllis Iyer is a 53 y.o. female     Chief Complaint   Patient presents with   • Seizures        History of Present Illness    53 y.o. woman returns in follow up for CPSz and VNS therapy.  Last visit on 12/11/17 started OXC and programmed VNS.         Last week ran out of medication for 4 days and had sz.  Right side of face has pulling sensation and eye blurring.      Denies side effects of OXC.      VNS interrogated and recorded on flowsheet      Aura of right eye visual blurring and pulling.  Right face and tongue goes numb.     Past Medical History:   Diagnosis Date   • Arthritis    • CAD (coronary artery disease)    • Chest tightness or pressure    • Complex partial seizure (CMS/HCC)    • Diabetes mellitus (CMS/HCC)    • Dyslipidemia    • FHx: migraine headaches    • GERD (gastroesophageal reflux disease)    • Hiatal hernia    • Hypercholesterolemia    • Hypertension    • Myocardial infarction (CMS/HCC)    • JOSE on CPAP    • Stroke syndrome    • Stroke syndrome      Family History   Problem Relation Age of Onset   • Heart disease Other    • Diabetes Other    • Cancer Other    • Stroke Other    • Coronary artery disease Mother    • Heart attack Mother    • Coronary artery disease Father      Social History     Socioeconomic History   • Marital status:      Spouse name: Not on file   • Number of children: Not on file   • Years of education: Not on file   • Highest education level: Not on file   Tobacco Use   • Smoking status: Former Smoker   • Smokeless tobacco: Never Used   Substance and Sexual Activity   • Alcohol use: No       Review of Systems   Constitutional: Negative for activity change, fatigue and unexpected weight change.   HENT: Negative for tinnitus and trouble swallowing.    Eyes: Negative for photophobia and visual disturbance.   Respiratory: Negative for apnea, cough and choking.    Cardiovascular: Negative for leg swelling.   Gastrointestinal: Negative for  "nausea and vomiting.   Endocrine: Negative for cold intolerance and heat intolerance.   Genitourinary: Negative for difficulty urinating, frequency, menstrual problem and urgency.   Musculoskeletal: Negative for back pain, gait problem, myalgias and neck pain.   Skin: Negative for color change and rash.   Allergic/Immunologic: Negative for immunocompromised state.   Neurological: Positive for seizures and headaches. Negative for dizziness, tremors, syncope, facial asymmetry, speech difficulty, weakness, light-headedness and numbness.   Hematological: Negative for adenopathy. Does not bruise/bleed easily.   Psychiatric/Behavioral: Positive for decreased concentration and dysphoric mood. Negative for behavioral problems, confusion, hallucinations and sleep disturbance.       Objective     Vitals:    03/12/19 1403   BP: 126/72   BP Location: Left arm   Patient Position: Standing   Cuff Size: Adult   Pulse: 74   Resp: 18   SpO2: 97%   Weight: 99.8 kg (220 lb)   Height: 170.2 cm (67\")       Neurologic Exam     Mental Status   Oriented to person, place, and time.   Registration: recalls 3 of 3 objects. Recall at 5 minutes: recalls 3 of 3 objects. Follows 3 step commands.   Attention: normal. Concentration: normal.   Speech: speech is normal   Level of consciousness: alert  Knowledge: good and consistent with education.   Able to name object. Able to read. Able to repeat. Able to write. Normal comprehension.     Cranial Nerves     CN II   Visual fields full to confrontation.   Visual acuity: normal  Right visual field deficit: none  Left visual field deficit: none     CN III, IV, VI   Pupils are equal, round, and reactive to light.  Extraocular motions are normal.   Nystagmus: none   Diplopia: none  Ophthalmoparesis: none  Upgaze: normal  Downgaze: normal  Conjugate gaze: present  Vestibulo-ocular reflex: present    CN V   Facial sensation intact.   Right corneal reflex: normal  Left corneal reflex: normal    CN VII "   Right facial weakness: none  Left facial weakness: none    CN VIII   Hearing: intact    CN IX, X   Palate: symmetric  Right gag reflex: normal  Left gag reflex: normal    CN XI   Right sternocleidomastoid strength: normal  Left sternocleidomastoid strength: normal    CN XII   Tongue: not atrophic  Fasciculations: absent  Tongue deviation: none    Motor Exam   Muscle bulk: normal  Overall muscle tone: normal  Right arm tone: normal  Left arm tone: normal  Right leg tone: normal  Left leg tone: normal    Strength   Strength 5/5 throughout.     Sensory Exam   Light touch normal.   Pinprick normal.     Gait, Coordination, and Reflexes     Gait  Gait: normal    Coordination   Romberg: negative  Finger to nose coordination: normal  Heel to shin coordination: normal  Tandem walking coordination: normal    Tremor   Resting tremor: absent  Intention tremor: absent  Action tremor: absent    Reflexes   Reflexes 2+ except as noted.       Physical Exam   Constitutional: She is oriented to person, place, and time. She appears well-developed and well-nourished.   Eyes: EOM are normal. Pupils are equal, round, and reactive to light.   Neurological: She is oriented to person, place, and time. She has normal strength. She has a normal Finger-Nose-Finger Test, a normal Heel to Shin Test, a normal Romberg Test and a normal Tandem Gait Test. Gait normal.   Psychiatric: Her speech is normal.   Nursing note and vitals reviewed.      Lab on 02/06/2019   Component Date Value Ref Range Status   • Glucose 02/06/2019 237* 70 - 110 mg/dL Final   • BUN 02/06/2019 11  7 - 21 mg/dL Final   • Creatinine 02/06/2019 0.74  0.43 - 1.29 mg/dL Final   • Sodium 02/06/2019 138  135 - 153 mmol/L Final   • Potassium 02/06/2019 3.7  3.5 - 5.3 mmol/L Final   • Chloride 02/06/2019 106  99 - 112 mmol/L Final   • CO2 02/06/2019 24.8  24.3 - 31.9 mmol/L Final   • Calcium 02/06/2019 8.9  7.7 - 10.0 mg/dL Final   • Total Protein 02/06/2019 6.9  6.0 - 8.0 g/dL  Final   • Albumin 02/06/2019 4.50  3.50 - 5.00 g/dL Final   • ALT (SGPT) 02/06/2019 37* 10 - 36 U/L Final   • AST (SGOT) 02/06/2019 17  10 - 30 U/L Final   • Alkaline Phosphatase 02/06/2019 186* 35 - 104 U/L Final    Note New Reference Ranges   • Total Bilirubin 02/06/2019 0.4  0.2 - 1.8 mg/dL Final   • eGFR Non African Amer 02/06/2019 81  >60 mL/min/1.73 Final   • Globulin 02/06/2019 2.4  gm/dL Final   • A/G Ratio 02/06/2019 1.9  1.5 - 2.5 g/dL Final   • BUN/Creatinine Ratio 02/06/2019 14.9  7.0 - 25.0 Final   • Anion Gap 02/06/2019 7.2  3.6 - 11.2 mmol/L Final   • TSH 02/06/2019 2.668  0.550 - 4.780 mIU/mL Final   • 1,25-Dihydroxy, Vitamin D 02/06/2019 74.8  19.9 - 79.3 pg/mL Final   • Hemoglobin A1C 02/06/2019 8.90* 4.50 - 5.70 % Final   • Total Cholesterol 02/06/2019 170  0 - 200 mg/dL Final   • Triglycerides 02/06/2019 115  0 - 150 mg/dL Final   • HDL Cholesterol 02/06/2019 43* 60 - 100 mg/dL Final   • LDL Cholesterol  02/06/2019 104* 0 - 100 mg/dL Final   • VLDL Cholesterol 02/06/2019 23  mg/dL Final   • LDL/HDL Ratio 02/06/2019 2.42   Final   • WBC 02/06/2019 9.43  4.50 - 12.50 10*3/mm3 Final   • RBC 02/06/2019 4.93  4.20 - 5.40 10*6/mm3 Final   • Hemoglobin 02/06/2019 13.6  12.0 - 16.0 g/dL Final   • Hematocrit 02/06/2019 41.1  37.0 - 47.0 % Final   • MCV 02/06/2019 83.4  80.0 - 94.0 fL Final   • MCH 02/06/2019 27.6  27.0 - 33.0 pg Final   • MCHC 02/06/2019 33.1  33.0 - 37.0 g/dL Final   • RDW 02/06/2019 13.9  11.5 - 14.5 % Final   • RDW-SD 02/06/2019 40.9  37.0 - 54.0 fl Final   • MPV 02/06/2019 10.0  6.0 - 10.0 fL Final   • Platelets 02/06/2019 350  130 - 400 10*3/mm3 Final   • Neutrophil % 02/06/2019 49.6  30.0 - 70.0 % Final   • Lymphocyte % 02/06/2019 39.8  21.0 - 51.0 % Final   • Monocyte % 02/06/2019 6.4  0.0 - 10.0 % Final   • Eosinophil % 02/06/2019 3.2  0.0 - 5.0 % Final   • Basophil % 02/06/2019 0.6  0.0 - 2.0 % Final   • Immature Grans % 02/06/2019 0.4  0.0 - 0.5 % Final   • Neutrophils, Absolute  02/06/2019 4.68  1.40 - 6.50 10*3/mm3 Final   • Lymphocytes, Absolute 02/06/2019 3.75* 1.00 - 3.00 10*3/mm3 Final   • Monocytes, Absolute 02/06/2019 0.60  0.10 - 0.90 10*3/mm3 Final   • Eosinophils, Absolute 02/06/2019 0.30  0.00 - 0.70 10*3/mm3 Final   • Basophils, Absolute 02/06/2019 0.06  0.00 - 0.30 10*3/mm3 Final   • Immature Grans, Absolute 02/06/2019 0.04* 0.00 - 0.03 10*3/mm3 Final   • Osmolality Calc 02/06/2019 282.8  273.0 - 305.0 mOsm/kg Final         Assessment/Plan     Problem List Items Addressed This Visit        Nervous and Auditory    Partial idiopathic epilepsy with seizures of localized onset, intractable, without status epilepticus (CMS/HCC)    Current Assessment & Plan     Breakthrough sz off OXC    Continue  mg BID          Relevant Medications    gabapentin (NEURONTIN) 600 MG tablet    OXcarbazepine (TRILEPTAL) 600 MG tablet       Other    Status post VNS (vagus nerve stimulator) placement - Primary    Current Assessment & Plan     VNS interrogated and recorded on flowsheet                    No Follow-up on file.

## 2019-05-01 DIAGNOSIS — E78.00 HYPERCHOLESTEROLEMIA: ICD-10-CM

## 2019-05-02 RX ORDER — ATORVASTATIN CALCIUM 80 MG/1
TABLET, FILM COATED ORAL
Qty: 30 TABLET | Refills: 11 | Status: SHIPPED | OUTPATIENT
Start: 2019-05-02 | End: 2021-07-30 | Stop reason: ALTCHOICE

## 2019-05-08 ENCOUNTER — TELEPHONE (OUTPATIENT)
Dept: CARDIOLOGY | Facility: CLINIC | Age: 54
End: 2019-05-08

## 2019-05-08 NOTE — TELEPHONE ENCOUNTER
5/7/19  Rec'd a faxed request from Dr. Mukul Araiza for cardiac clearance. Pt will be having cataract surgery on 5/16/19.  Request placed on Darryl Ocasio's desk for review.  RASHMI SHEPARD

## 2019-06-26 ENCOUNTER — TELEPHONE (OUTPATIENT)
Dept: CARDIOLOGY | Facility: CLINIC | Age: 54
End: 2019-06-26

## 2019-06-26 NOTE — TELEPHONE ENCOUNTER
[facility] denied Repatha due to wrong NDC number submitted by pharmacy. Pharmacy needs to use [facility] [id number].   Called [facility] pharmacy and patient has picked up her medication with no problem. Julia Peters MA

## 2019-08-07 ENCOUNTER — TELEPHONE (OUTPATIENT)
Dept: CARDIOLOGY | Facility: CLINIC | Age: 54
End: 2019-08-07

## 2019-08-07 ENCOUNTER — OFFICE VISIT (OUTPATIENT)
Dept: CARDIOLOGY | Facility: CLINIC | Age: 54
End: 2019-08-07

## 2019-08-07 VITALS
HEIGHT: 67 IN | HEART RATE: 84 BPM | DIASTOLIC BLOOD PRESSURE: 88 MMHG | SYSTOLIC BLOOD PRESSURE: 132 MMHG | WEIGHT: 213.2 LBS | BODY MASS INDEX: 33.46 KG/M2 | OXYGEN SATURATION: 98 %

## 2019-08-07 DIAGNOSIS — I10 ESSENTIAL HYPERTENSION: ICD-10-CM

## 2019-08-07 DIAGNOSIS — E78.5 DYSLIPIDEMIA: Primary | ICD-10-CM

## 2019-08-07 DIAGNOSIS — R06.02 SHORTNESS OF BREATH: Primary | ICD-10-CM

## 2019-08-07 DIAGNOSIS — I25.10 CORONARY ARTERY DISEASE INVOLVING NATIVE CORONARY ARTERY OF NATIVE HEART WITHOUT ANGINA PECTORIS: ICD-10-CM

## 2019-08-07 PROCEDURE — 99214 OFFICE O/P EST MOD 30 MIN: CPT | Performed by: PHYSICIAN ASSISTANT

## 2019-08-07 RX ORDER — AZITHROMYCIN 250 MG/1
TABLET, FILM COATED ORAL
Qty: 6 TABLET | Refills: 0 | Status: SHIPPED | OUTPATIENT
Start: 2019-08-07 | End: 2020-06-04

## 2019-08-07 NOTE — PATIENT INSTRUCTIONS

## 2019-08-07 NOTE — TELEPHONE ENCOUNTER
"Pt LVM stating that she saw Darryl this am and was told to call back tyrese Cartagena.       Today's OV note states:  \"I would like to recheck lipid parameters since she is on Repatha.  She is also on 80 of Lipitor as well.  She is having some concern about the cost of Repatha and will working on that.\"          Pt states that she was told to go online to get a co-pay card online, states that she was unable to access that card because it asked for office info like Group ID.         Redirected call to Julia.                 "

## 2019-08-08 NOTE — TELEPHONE ENCOUNTER
Pt called back and stated that the insurance issue w/ her Repatha has been handled, stated they were running it wrong. She now has her meds.     Pt stated Darryl mentioned getting labs done. I re-ordered lipid panel for drawn in 1-2 weeks.       Pt stated that she is supposed to get scheduled for echo and stress, I told her to call back mid next week if she hasn't heard anything.

## 2019-08-15 DIAGNOSIS — I10 ESSENTIAL HYPERTENSION: ICD-10-CM

## 2019-08-15 RX ORDER — CLONIDINE HYDROCHLORIDE 0.1 MG/1
TABLET ORAL
Qty: 270 TABLET | Refills: 3 | Status: SHIPPED | OUTPATIENT
Start: 2019-08-15 | End: 2020-06-04

## 2019-09-27 RX ORDER — TRAZODONE HYDROCHLORIDE 150 MG/1
TABLET ORAL
Qty: 30 TABLET | Refills: 5 | Status: SHIPPED | OUTPATIENT
Start: 2019-09-27 | End: 2020-03-31

## 2019-10-31 ENCOUNTER — TELEPHONE (OUTPATIENT)
Dept: CARDIOLOGY | Facility: CLINIC | Age: 54
End: 2019-10-31

## 2019-10-31 DIAGNOSIS — R06.02 SHORTNESS OF BREATH: ICD-10-CM

## 2019-10-31 DIAGNOSIS — I25.10 CORONARY ARTERY DISEASE INVOLVING NATIVE CORONARY ARTERY OF NATIVE HEART WITHOUT ANGINA PECTORIS: ICD-10-CM

## 2019-10-31 DIAGNOSIS — I10 ESSENTIAL HYPERTENSION: Primary | ICD-10-CM

## 2019-10-31 DIAGNOSIS — R07.9 CHEST PAIN, UNSPECIFIED TYPE: ICD-10-CM

## 2019-10-31 NOTE — TELEPHONE ENCOUNTER
Pt called and stated she is continuing to have SOA. Pt reports severe episode last night. Pt canceled previous Stress/ Echo due to not meeting deductible. Instructed pt to call insurance and verify if she has met deductible. Pt reports she has and is ready to have the Stress/Echo.     Stress/ Echo  10/7/19 and will need to be reordered.

## 2019-11-19 ENCOUNTER — HOSPITAL ENCOUNTER (OUTPATIENT)
Dept: CARDIOLOGY | Facility: HOSPITAL | Age: 54
Discharge: HOME | End: 2019-11-19

## 2019-11-19 ENCOUNTER — LAB (OUTPATIENT)
Dept: LAB | Facility: HOSPITAL | Age: 54
End: 2019-11-19

## 2019-11-19 ENCOUNTER — TRANSCRIBE ORDERS (OUTPATIENT)
Dept: LAB | Facility: HOSPITAL | Age: 54
End: 2019-11-19

## 2019-11-19 VITALS — HEIGHT: 67 IN | WEIGHT: 210.98 LBS | BODY MASS INDEX: 33.11 KG/M2

## 2019-11-19 DIAGNOSIS — R07.9 CHEST PAIN, UNSPECIFIED TYPE: ICD-10-CM

## 2019-11-19 DIAGNOSIS — R06.02 SHORTNESS OF BREATH: ICD-10-CM

## 2019-11-19 DIAGNOSIS — E13.9 DIABETES 1.5, MANAGED AS TYPE 1 (HCC): Primary | ICD-10-CM

## 2019-11-19 DIAGNOSIS — I10 ESSENTIAL HYPERTENSION: ICD-10-CM

## 2019-11-19 DIAGNOSIS — E78.5 DYSLIPIDEMIA: ICD-10-CM

## 2019-11-19 DIAGNOSIS — E13.9 DIABETES 1.5, MANAGED AS TYPE 1 (HCC): ICD-10-CM

## 2019-11-19 DIAGNOSIS — I25.10 CORONARY ARTERY DISEASE INVOLVING NATIVE CORONARY ARTERY OF NATIVE HEART WITHOUT ANGINA PECTORIS: ICD-10-CM

## 2019-11-19 LAB
ANION GAP SERPL CALCULATED.3IONS-SCNC: 12.5 MMOL/L (ref 5–15)
BUN BLD-MCNC: 17 MG/DL (ref 6–20)
BUN/CREAT SERPL: 24.6 (ref 7–25)
CALCIUM SPEC-SCNC: 9 MG/DL (ref 8.6–10.5)
CHLORIDE SERPL-SCNC: 104 MMOL/L (ref 98–107)
CHOLEST SERPL-MCNC: 172 MG/DL (ref 0–200)
CO2 SERPL-SCNC: 23.5 MMOL/L (ref 22–29)
CREAT BLD-MCNC: 0.69 MG/DL (ref 0.57–1)
GFR SERPL CREATININE-BSD FRML MDRD: 88 ML/MIN/1.73
GLUCOSE BLD-MCNC: 92 MG/DL (ref 65–99)
HDLC SERPL-MCNC: 45 MG/DL (ref 40–60)
LDLC SERPL CALC-MCNC: 105 MG/DL (ref 0–100)
LDLC/HDLC SERPL: 2.32 {RATIO}
POTASSIUM BLD-SCNC: 3.4 MMOL/L (ref 3.5–5.2)
SODIUM BLD-SCNC: 140 MMOL/L (ref 136–145)
TRIGL SERPL-MCNC: 112 MG/DL (ref 0–150)
VLDLC SERPL-MCNC: 22.4 MG/DL

## 2019-11-19 PROCEDURE — 0 TECHNETIUM SESTAMIBI: Performed by: INTERNAL MEDICINE

## 2019-11-19 PROCEDURE — 36415 COLL VENOUS BLD VENIPUNCTURE: CPT

## 2019-11-19 PROCEDURE — 25010000002 REGADENOSON 0.4 MG/5ML SOLUTION: Performed by: INTERNAL MEDICINE

## 2019-11-19 PROCEDURE — 93306 TTE W/DOPPLER COMPLETE: CPT

## 2019-11-19 PROCEDURE — 80061 LIPID PANEL: CPT | Performed by: OPHTHALMOLOGY

## 2019-11-19 PROCEDURE — 78452 HT MUSCLE IMAGE SPECT MULT: CPT | Performed by: INTERNAL MEDICINE

## 2019-11-19 PROCEDURE — A9500 TC99M SESTAMIBI: HCPCS | Performed by: INTERNAL MEDICINE

## 2019-11-19 PROCEDURE — 93018 CV STRESS TEST I&R ONLY: CPT | Performed by: INTERNAL MEDICINE

## 2019-11-19 PROCEDURE — 80048 BASIC METABOLIC PNL TOTAL CA: CPT | Performed by: OPHTHALMOLOGY

## 2019-11-19 PROCEDURE — 93017 CV STRESS TEST TRACING ONLY: CPT

## 2019-11-19 PROCEDURE — 78452 HT MUSCLE IMAGE SPECT MULT: CPT

## 2019-11-19 PROCEDURE — 93306 TTE W/DOPPLER COMPLETE: CPT | Performed by: INTERNAL MEDICINE

## 2019-11-19 PROCEDURE — 93016 CV STRESS TEST SUPVJ ONLY: CPT | Performed by: NURSE PRACTITIONER

## 2019-11-19 RX ADMIN — TECHNETIUM TC 99M SESTAMIBI 1 DOSE: 1 INJECTION INTRAVENOUS at 08:25

## 2019-11-19 RX ADMIN — TECHNETIUM TC 99M SESTAMIBI 1 DOSE: 1 INJECTION INTRAVENOUS at 08:24

## 2019-11-19 RX ADMIN — REGADENOSON 0.4 MG: 0.08 INJECTION, SOLUTION INTRAVENOUS at 08:25

## 2019-11-24 LAB
BH CV NUCLEAR PRIOR STUDY: 3
BH CV STRESS BP STAGE 1: NORMAL
BH CV STRESS COMMENTS STAGE 1: NORMAL
BH CV STRESS DOSE REGADENOSON STAGE 1: 0.4
BH CV STRESS DURATION MIN STAGE 1: 0
BH CV STRESS DURATION SEC STAGE 1: 10
BH CV STRESS HR STAGE 1: 94
BH CV STRESS PROTOCOL 1: NORMAL
BH CV STRESS RECOVERY BP: NORMAL MMHG
BH CV STRESS RECOVERY HR: 91 BPM
BH CV STRESS STAGE 1: 1
MAXIMAL PREDICTED HEART RATE: 164 BPM
PERCENT MAX PREDICTED HR: 59.15 %
STRESS BASELINE BP: NORMAL MMHG
STRESS BASELINE HR: 65 BPM
STRESS PERCENT HR: 70 %
STRESS POST PEAK BP: NORMAL MMHG
STRESS POST PEAK HR: 97 BPM
STRESS TARGET HR: 139 BPM

## 2019-11-25 NOTE — PROGRESS NOTES
Patient aware of stress test results.  Discussed provider recommendations.  Patient agreeable.  Verbalizes understanding.

## 2019-11-26 LAB
BH CV ECHO MEAS - ACS: 1.7 CM
BH CV ECHO MEAS - AO MAX PG (FULL): 1.6 MMHG
BH CV ECHO MEAS - AO MAX PG: 4.4 MMHG
BH CV ECHO MEAS - AO MEAN PG (FULL): 1 MMHG
BH CV ECHO MEAS - AO MEAN PG: 2 MMHG
BH CV ECHO MEAS - AO ROOT AREA (BSA CORRECTED): 1.3
BH CV ECHO MEAS - AO ROOT AREA: 6.2 CM^2
BH CV ECHO MEAS - AO ROOT DIAM: 2.8 CM
BH CV ECHO MEAS - AO V2 MAX: 105 CM/SEC
BH CV ECHO MEAS - AO V2 MEAN: 69.1 CM/SEC
BH CV ECHO MEAS - AO V2 VTI: 21.4 CM
BH CV ECHO MEAS - BSA(HAYCOCK): 2.2 M^2
BH CV ECHO MEAS - BSA: 2.1 M^2
BH CV ECHO MEAS - BZI_BMI: 33.4 KILOGRAMS/M^2
BH CV ECHO MEAS - BZI_METRIC_HEIGHT: 170.2 CM
BH CV ECHO MEAS - BZI_METRIC_WEIGHT: 96.6 KG
BH CV ECHO MEAS - EDV(CUBED): 54.4 ML
BH CV ECHO MEAS - EDV(TEICH): 61.6 ML
BH CV ECHO MEAS - EF(CUBED): 71.3 %
BH CV ECHO MEAS - EF(TEICH): 63.7 %
BH CV ECHO MEAS - ESV(CUBED): 15.6 ML
BH CV ECHO MEAS - ESV(TEICH): 22.3 ML
BH CV ECHO MEAS - FS: 34 %
BH CV ECHO MEAS - IVS/LVPW: 0.97
BH CV ECHO MEAS - IVSD: 1 CM
BH CV ECHO MEAS - LA DIMENSION: 3.4 CM
BH CV ECHO MEAS - LA/AO: 1.2
BH CV ECHO MEAS - LAT PEAK E' VEL: 7.5 CM/SEC
BH CV ECHO MEAS - LV IVRT: 0.14 SEC
BH CV ECHO MEAS - LV MASS(C)D: 122.7 GRAMS
BH CV ECHO MEAS - LV MASS(C)DI: 59.1 GRAMS/M^2
BH CV ECHO MEAS - LV MAX PG: 2.8 MMHG
BH CV ECHO MEAS - LV MEAN PG: 1 MMHG
BH CV ECHO MEAS - LV V1 MAX: 83.6 CM/SEC
BH CV ECHO MEAS - LV V1 MEAN: 47.1 CM/SEC
BH CV ECHO MEAS - LV V1 VTI: 17.1 CM
BH CV ECHO MEAS - LVIDD: 3.8 CM
BH CV ECHO MEAS - LVIDS: 2.5 CM
BH CV ECHO MEAS - LVPWD: 1.1 CM
BH CV ECHO MEAS - MED PEAK E' VEL: 3.7 CM/SEC
BH CV ECHO MEAS - MV A MAX VEL: 88.1 CM/SEC
BH CV ECHO MEAS - MV DEC TIME: 0.26 SEC
BH CV ECHO MEAS - MV E MAX VEL: 67.4 CM/SEC
BH CV ECHO MEAS - MV E/A: 0.77
BH CV ECHO MEAS - MV MAX PG: 3.7 MMHG
BH CV ECHO MEAS - MV MEAN PG: 2 MMHG
BH CV ECHO MEAS - MV V2 MAX: 96.1 CM/SEC
BH CV ECHO MEAS - MV V2 MEAN: 57.7 CM/SEC
BH CV ECHO MEAS - MV V2 VTI: 31.9 CM
BH CV ECHO MEAS - PA MAX PG (FULL): 2.1 MMHG
BH CV ECHO MEAS - PA MAX PG: 4.2 MMHG
BH CV ECHO MEAS - PA MEAN PG (FULL): 1 MMHG
BH CV ECHO MEAS - PA MEAN PG: 2 MMHG
BH CV ECHO MEAS - PA V2 MAX: 102 CM/SEC
BH CV ECHO MEAS - PA V2 MEAN: 64.4 CM/SEC
BH CV ECHO MEAS - PA V2 VTI: 20.4 CM
BH CV ECHO MEAS - RV MAX PG: 2.1 MMHG
BH CV ECHO MEAS - RV MEAN PG: 1 MMHG
BH CV ECHO MEAS - RV V1 MAX: 71.8 CM/SEC
BH CV ECHO MEAS - RV V1 MEAN: 46.7 CM/SEC
BH CV ECHO MEAS - RV V1 VTI: 16.5 CM
BH CV ECHO MEAS - RVDD: 2.6 CM
BH CV ECHO MEAS - SI(AO): 63.4 ML/M^2
BH CV ECHO MEAS - SI(CUBED): 18.7 ML/M^2
BH CV ECHO MEAS - SI(TEICH): 18.9 ML/M^2
BH CV ECHO MEAS - SV(AO): 131.8 ML
BH CV ECHO MEAS - SV(CUBED): 38.8 ML
BH CV ECHO MEAS - SV(TEICH): 39.2 ML
BH CV ECHO MEASUREMENTS AVERAGE E/E' RATIO: 12.04
MAXIMAL PREDICTED HEART RATE: 164 BPM
STRESS TARGET HR: 139 BPM

## 2019-11-26 NOTE — PROGRESS NOTES
Patient aware of echo results.  Discussed provider recommendations.  Patient agrees.  Verbalizes understanding.

## 2020-03-25 RX ORDER — OXCARBAZEPINE 600 MG/1
TABLET, FILM COATED ORAL
Qty: 60 TABLET | Refills: 10 | OUTPATIENT
Start: 2020-03-25

## 2020-03-31 RX ORDER — TRAZODONE HYDROCHLORIDE 150 MG/1
TABLET ORAL
Qty: 30 TABLET | Refills: 4 | Status: SHIPPED | OUTPATIENT
Start: 2020-03-31

## 2020-04-02 RX ORDER — OXCARBAZEPINE 600 MG/1
600 TABLET, FILM COATED ORAL 2 TIMES DAILY
Qty: 60 TABLET | Refills: 11 | Status: SHIPPED | OUTPATIENT
Start: 2020-04-02 | End: 2020-05-20 | Stop reason: SDUPTHER

## 2020-05-20 ENCOUNTER — OFFICE VISIT (OUTPATIENT)
Dept: NEUROLOGY | Facility: CLINIC | Age: 55
End: 2020-05-20

## 2020-05-20 VITALS
HEART RATE: 69 BPM | BODY MASS INDEX: 34.69 KG/M2 | OXYGEN SATURATION: 98 % | SYSTOLIC BLOOD PRESSURE: 128 MMHG | TEMPERATURE: 90.5 F | WEIGHT: 221 LBS | HEIGHT: 67 IN | DIASTOLIC BLOOD PRESSURE: 82 MMHG

## 2020-05-20 DIAGNOSIS — Z96.89 STATUS POST VNS (VAGUS NERVE STIMULATOR) PLACEMENT: ICD-10-CM

## 2020-05-20 DIAGNOSIS — G40.019 PARTIAL IDIOPATHIC EPILEPSY WITH SEIZURES OF LOCALIZED ONSET, INTRACTABLE, WITHOUT STATUS EPILEPTICUS (HCC): Primary | ICD-10-CM

## 2020-05-20 PROBLEM — R00.2 PALPITATIONS: Status: RESOLVED | Noted: 2018-06-28 | Resolved: 2020-05-20

## 2020-05-20 PROCEDURE — 95970 ALYS NPGT W/O PRGRMG: CPT | Performed by: PSYCHIATRY & NEUROLOGY

## 2020-05-20 PROCEDURE — 99212 OFFICE O/P EST SF 10 MIN: CPT | Performed by: PSYCHIATRY & NEUROLOGY

## 2020-05-20 RX ORDER — BIMATOPROST 0.01 %
DROPS OPHTHALMIC (EYE)
COMMUNITY
Start: 2020-02-15 | End: 2021-08-23

## 2020-05-20 RX ORDER — OXCARBAZEPINE 600 MG/1
600 TABLET, FILM COATED ORAL 2 TIMES DAILY
Qty: 60 TABLET | Refills: 11 | Status: SHIPPED | OUTPATIENT
Start: 2020-05-20 | End: 2020-06-04

## 2020-05-20 RX ORDER — LOSARTAN POTASSIUM 50 MG/1
TABLET ORAL
COMMUNITY
Start: 2020-05-19 | End: 2020-05-20 | Stop reason: DRUGHIGH

## 2020-05-20 RX ORDER — IBUPROFEN 800 MG/1
TABLET ORAL
COMMUNITY
Start: 2020-05-18 | End: 2022-02-22

## 2020-05-20 NOTE — PROGRESS NOTES
Subjective   Patient ID: Phyllis Iyer is a 55 y.o. female     Chief Complaint   Patient presents with   • Partial Idiopathic Epilepsy     1yr follow up         History of Present Illness    55 y.o. woman returns in follow up for CPSz and VNS therapy.  Last visit on 3/12/19 continued OXC and programmed VNS.       Last sz 7 months ago awoke with tongue chewed.  Right eye throbbing.        Denies side effects of OXC.      VNS interrogated and recorded on flowsheet      Aura of right eye visual blurring and pulling.  Right face and tongue goes numb.     Past Medical History:   Diagnosis Date   • Arthritis    • CAD (coronary artery disease)    • Chest tightness or pressure    • Complex partial seizure (CMS/HCC)    • Diabetes mellitus (CMS/HCC)    • Dyslipidemia    • FHx: migraine headaches    • GERD (gastroesophageal reflux disease)    • Hiatal hernia    • Hypercholesterolemia    • Hypertension    • Myocardial infarction (CMS/HCC)    • JOSE on CPAP    • Stroke syndrome    • Stroke syndrome      Family History   Problem Relation Age of Onset   • Heart disease Other    • Diabetes Other    • Cancer Other    • Stroke Other    • Coronary artery disease Mother    • Heart attack Mother    • Coronary artery disease Father      Social History     Socioeconomic History   • Marital status:      Spouse name: Not on file   • Number of children: Not on file   • Years of education: Not on file   • Highest education level: Not on file   Tobacco Use   • Smoking status: Former Smoker   • Smokeless tobacco: Never Used   Substance and Sexual Activity   • Alcohol use: No       Review of Systems   Constitutional: Negative for activity change, fatigue and unexpected weight change.   HENT: Negative for tinnitus and trouble swallowing.    Eyes: Negative for photophobia and visual disturbance.   Respiratory: Negative for apnea, cough and choking.    Cardiovascular: Negative for leg swelling.   Gastrointestinal: Negative for nausea and  "vomiting.   Endocrine: Negative for cold intolerance and heat intolerance.   Genitourinary: Negative for difficulty urinating, frequency, menstrual problem and urgency.   Musculoskeletal: Negative for back pain, gait problem, myalgias and neck pain.   Skin: Negative for color change and rash.   Allergic/Immunologic: Negative for immunocompromised state.   Neurological: Positive for seizures and headaches. Negative for dizziness, tremors, syncope, facial asymmetry, speech difficulty, weakness, light-headedness and numbness.   Hematological: Negative for adenopathy. Does not bruise/bleed easily.   Psychiatric/Behavioral: Positive for decreased concentration and dysphoric mood. Negative for behavioral problems, confusion, hallucinations and sleep disturbance.       Objective     Vitals:    05/20/20 1123   BP: 128/82   Pulse: 69   Temp: (!) 90.5 °F (32.5 °C)   SpO2: 98%   Weight: 100 kg (221 lb)   Height: 170 cm (66.93\")       Neurologic Exam     Mental Status   Oriented to person, place, and time.   Registration: recalls 3 of 3 objects. Recall at 5 minutes: recalls 3 of 3 objects. Follows 3 step commands.   Attention: normal. Concentration: normal.   Speech: speech is normal   Level of consciousness: alert  Knowledge: good and consistent with education.   Able to name object. Able to read. Able to repeat. Able to write. Normal comprehension.     Cranial Nerves     CN II   Visual fields full to confrontation.   Visual acuity: normal  Right visual field deficit: none  Left visual field deficit: none     CN III, IV, VI   Pupils are equal, round, and reactive to light.  Extraocular motions are normal.   Nystagmus: none   Diplopia: none  Ophthalmoparesis: none  Upgaze: normal  Downgaze: normal  Conjugate gaze: present  Vestibulo-ocular reflex: present    CN V   Facial sensation intact.   Right corneal reflex: normal  Left corneal reflex: normal    CN VII   Right facial weakness: none  Left facial weakness: none    CN VIII "   Hearing: intact    CN IX, X   Palate: symmetric  Right gag reflex: normal  Left gag reflex: normal    CN XI   Right sternocleidomastoid strength: normal  Left sternocleidomastoid strength: normal    CN XII   Tongue: not atrophic  Fasciculations: absent  Tongue deviation: none    Motor Exam   Muscle bulk: normal  Overall muscle tone: normal  Right arm tone: normal  Left arm tone: normal  Right leg tone: normal  Left leg tone: normal    Strength   Strength 5/5 throughout.     Sensory Exam   Light touch normal.   Pinprick normal.     Gait, Coordination, and Reflexes     Gait  Gait: normal    Coordination   Romberg: negative  Finger to nose coordination: normal  Heel to shin coordination: normal  Tandem walking coordination: normal    Tremor   Resting tremor: absent  Intention tremor: absent  Action tremor: absent    Reflexes   Reflexes 2+ except as noted.       Physical Exam   Constitutional: She is oriented to person, place, and time. She appears well-developed and well-nourished.   Eyes: Pupils are equal, round, and reactive to light. EOM are normal.   Neurological: She is oriented to person, place, and time. She has normal strength. She has a normal Finger-Nose-Finger Test, a normal Heel to Shin Test, a normal Romberg Test and a normal Tandem Gait Test. Gait normal.   Psychiatric: Her speech is normal.   Nursing note and vitals reviewed.      Hospital Outpatient Visit on 11/19/2019   Component Date Value Ref Range Status   • Target HR (85%) 11/19/2019 139  bpm Final   • Max. Pred. HR (100%) 11/19/2019 164  bpm Final   • Nuclear Prior Study 11/19/2019 3   Final   •  CV STRESS PROTOCOL 1 11/19/2019 Pharmacologic   Final   • Stage 1 11/19/2019 1   Final   • HR Stage 1 11/19/2019 94   Final   • BP Stage 1 11/19/2019 161/95   Final   • Duration Min Stage 1 11/19/2019 0   Final   • Duration Sec Stage 1 11/19/2019 10   Final   • Stress Dose Regadenoson Stage 1 11/19/2019 0.4   Final   • Stress Comments Stage 1  11/19/2019 10 sec bolus injection   Final   • Baseline HR 11/19/2019 65  bpm Final   • Baseline BP 11/19/2019 162/96  mmHg Final   • Peak HR 11/19/2019 97  bpm Final   • Percent Max Pred HR 11/19/2019 59.15  % Final   • Percent Target HR 11/19/2019 70  % Final   • Peak BP 11/19/2019 162/95  mmHg Final   • Recovery HR 11/19/2019 91  bpm Final   • Recovery BP 11/19/2019 161/95  mmHg Final         Assessment/Plan     Problem List Items Addressed This Visit        Nervous and Auditory    Partial idiopathic epilepsy with seizures of localized onset, intractable, without status epilepticus (CMS/HCC) - Primary    Current Assessment & Plan     One breakthrough sz in last year    Continue OXC    VNS          Relevant Medications    gabapentin (NEURONTIN) 600 MG tablet    OXcarbazepine (TRILEPTAL) 600 MG tablet       Other    Status post VNS (vagus nerve stimulator) placement    Current Assessment & Plan     Parameters on flowsheet                   No follow-ups on file.

## 2020-06-04 ENCOUNTER — LAB (OUTPATIENT)
Dept: LAB | Facility: HOSPITAL | Age: 55
End: 2020-06-04

## 2020-06-04 ENCOUNTER — OFFICE VISIT (OUTPATIENT)
Dept: CARDIOLOGY | Facility: CLINIC | Age: 55
End: 2020-06-04

## 2020-06-04 VITALS
TEMPERATURE: 97.3 F | DIASTOLIC BLOOD PRESSURE: 85 MMHG | WEIGHT: 214.2 LBS | HEART RATE: 76 BPM | BODY MASS INDEX: 33.62 KG/M2 | SYSTOLIC BLOOD PRESSURE: 152 MMHG | HEIGHT: 67 IN | OXYGEN SATURATION: 97 %

## 2020-06-04 DIAGNOSIS — R06.02 SOB (SHORTNESS OF BREATH): ICD-10-CM

## 2020-06-04 DIAGNOSIS — R00.2 PALPITATIONS: ICD-10-CM

## 2020-06-04 DIAGNOSIS — E78.00 PURE HYPERCHOLESTEROLEMIA: ICD-10-CM

## 2020-06-04 DIAGNOSIS — R07.9 CHEST PAIN, UNSPECIFIED TYPE: ICD-10-CM

## 2020-06-04 DIAGNOSIS — I25.10 CORONARY ARTERY DISEASE INVOLVING NATIVE CORONARY ARTERY OF NATIVE HEART WITHOUT ANGINA PECTORIS: ICD-10-CM

## 2020-06-04 DIAGNOSIS — I10 ESSENTIAL HYPERTENSION: Primary | ICD-10-CM

## 2020-06-04 DIAGNOSIS — E11.21 TYPE 2 DIABETES MELLITUS WITH DIABETIC NEPHROPATHY, WITHOUT LONG-TERM CURRENT USE OF INSULIN (HCC): ICD-10-CM

## 2020-06-04 DIAGNOSIS — I10 ESSENTIAL HYPERTENSION: ICD-10-CM

## 2020-06-04 PROCEDURE — 99214 OFFICE O/P EST MOD 30 MIN: CPT | Performed by: PHYSICIAN ASSISTANT

## 2020-06-04 PROCEDURE — 80061 LIPID PANEL: CPT | Performed by: PHYSICIAN ASSISTANT

## 2020-06-04 PROCEDURE — 83036 HEMOGLOBIN GLYCOSYLATED A1C: CPT | Performed by: PHYSICIAN ASSISTANT

## 2020-06-04 PROCEDURE — 36415 COLL VENOUS BLD VENIPUNCTURE: CPT

## 2020-06-04 PROCEDURE — 80053 COMPREHEN METABOLIC PANEL: CPT | Performed by: PHYSICIAN ASSISTANT

## 2020-06-04 PROCEDURE — 85025 COMPLETE CBC W/AUTO DIFF WBC: CPT | Performed by: PHYSICIAN ASSISTANT

## 2020-06-04 PROCEDURE — 93000 ELECTROCARDIOGRAM COMPLETE: CPT | Performed by: PHYSICIAN ASSISTANT

## 2020-06-04 RX ORDER — ISOSORBIDE MONONITRATE 30 MG/1
30 TABLET, EXTENDED RELEASE ORAL EVERY MORNING
Qty: 30 TABLET | Refills: 11 | Status: SHIPPED | OUTPATIENT
Start: 2020-06-04 | End: 2021-03-16 | Stop reason: SDUPTHER

## 2020-06-04 RX ORDER — NITROGLYCERIN 0.4 MG/1
0.4 TABLET SUBLINGUAL
Qty: 25 TABLET | Refills: 1 | Status: SHIPPED | OUTPATIENT
Start: 2020-06-04 | End: 2021-04-20 | Stop reason: SDUPTHER

## 2020-06-04 RX ORDER — IRBESARTAN AND HYDROCHLOROTHIAZIDE 150; 12.5 MG/1; MG/1
1 TABLET, FILM COATED ORAL DAILY
Qty: 30 TABLET | Refills: 5 | Status: SHIPPED | OUTPATIENT
Start: 2020-06-04 | End: 2021-04-20

## 2020-06-04 NOTE — PROGRESS NOTES
Problem list     Subjective   Phyllis Iyer is a 55 y.o. female     Chief Complaint   Patient presents with   • Hypertension     here for 6 month f/u   • Chest Pain   • Palpitations   • Shortness of Breath     Problem list:     1. CAD  1.1 cardiac catheterization in 2011 with stenting to the mid LAD as well as proximal, mid, and distal RCA with medical management recommended  1.2 left heart catheterization January 2016 with stenting of the right coronary artery with nonobstructive disease otherwise  1.3 stress test November 2016 but no evidence ischemia and preserved LV function  1.4 cardiac catheterization 5/4/2018 by Dr. Malik because of non-ST elevation myocardial infarction demonstrating high-grade RCA disease status post stenting ×2.  60% IntraStent restenosis of the LAD with medical management recommended.  Normal LV function noted  1.5 cardiac catheterization July 2018 with stenting of the LAD because of refractory angina.  30 to 50% of the circumflex and RCA with medical management recommended  1.6 stress test November 2019 with no evidence of ischemia  2.  Preserved systolic function  3.  Hypertension  4.  Dyslipidemia  5.  Diabetes mellitus  6.  Obstructive sleep apnea noncompliant with CPAP therapy    HPI    Patient is a 55-year-old female that presents back for follow-up.  Patient called in around November 2019 because she complained of chest discomfort and dyspnea.  Stress test results were negative and echo suggested good LV function with no critical valve disease.    Patient feels poorly.  She is experiencing symptoms of discomfort on the left side of her chest as a sharp pain or pressure at times.  This is similar to what she felt with previous stenting.  In fact, she has had multiple interventions and history of MI.  Patient is very concerned despite her negative stress test that she continues to have significant resting symptoms.  She has significant dyspnea.  When she tries to exert she has  moderate levels of shortness of breath.  She is always had a degree of mild dyspnea.  She does not describe PND orthopnea.    She does experience occasional palpitations.  Occasional fluttering sensation she will experience which is mild.  No strokelike symptoms.  No dizziness presyncope or syncope.  Otherwise she is doing well      Current Outpatient Medications on File Prior to Visit   Medication Sig Dispense Refill   • aspirin 81 MG tablet Take 1 tablet by mouth Daily. 30 tablet 11   • atorvastatin (LIPITOR) 80 MG tablet TAKE ONE TABLET BY MOUTH DAILY 30 tablet 11   • clopidogrel (PLAVIX) 75 MG tablet Take 1 tablet by mouth Daily. 90 tablet 3   • FLECTOR 1.3 % patch patch      • gabapentin (NEURONTIN) 600 MG tablet      • HYDROcodone-acetaminophen (NORCO) 7.5-325 MG per tablet Take 1 tablet by mouth Every 6 (Six) Hours As Needed for Moderate Pain (4-6).     • ibuprofen (ADVIL,MOTRIN) 800 MG tablet TK 1 T PO TID     • LUMIGAN 0.01 % ophthalmic drops      • pantoprazole (PROTONIX) 40 MG EC tablet 2 (Two) Times a Day.     • promethazine (PHENERGAN) 25 MG tablet Take 25 mg by mouth Every 6 (Six) Hours As Needed for Nausea or Vomiting.     • traZODone (DESYREL) 150 MG tablet TAKE ONE TABLET BY MOUTH EVERY NIGHT 30 tablet 4   • TRESIBA FLEXTOUCH 100 UNIT/ML solution pen-injector injection Daily.     • [DISCONTINUED] losartan (COZAAR) 25 MG tablet TAKE ONE TABLET BY MOUTH DAILY 30 tablet 5   • [DISCONTINUED] metoprolol tartrate (LOPRESSOR) 25 MG tablet Take  by mouth 2 (Two) Times a Day.     • [DISCONTINUED] nitroglycerin (NITROSTAT) 0.4 MG SL tablet Place  under the tongue. prn     • [DISCONTINUED] azithromycin (ZITHROMAX Z-TIMOTHY) 250 MG tablet Take 2 tablets the first day, then 1 tablet daily for 4 days. 6 tablet 0   • [DISCONTINUED] CloNIDine (CATAPRES) 0.1 MG tablet TAKE ONE TABLET BY MOUTH THREE TIMES A  tablet 3   • [DISCONTINUED] Dapagliflozin-Metformin HCl ER (XIGDUO XR)  MG tablet sustained-release  24 hour Take  by mouth Daily.     • [DISCONTINUED] Evolocumab with Infusor 420 MG/3.5ML solution cartridge Inject 420 mg under the skin into the appropriate area as directed Every 30 (Thirty) Days. 1 cartridge. 11   • [DISCONTINUED] orphenadrine (NORFLEX) 100 MG 12 hr tablet      • [DISCONTINUED] OXcarbazepine (TRILEPTAL) 600 MG tablet Take 1 tablet by mouth 2 (Two) Times a Day. 60 tablet 11     No current facility-administered medications on file prior to visit.        Sulfa antibiotics    Past Medical History:   Diagnosis Date   • Arthritis    • CAD (coronary artery disease)    • Chest tightness or pressure    • Complex partial seizure (CMS/HCC)    • Diabetes mellitus (CMS/HCC)    • Dyslipidemia    • FHx: migraine headaches    • GERD (gastroesophageal reflux disease)    • Hiatal hernia    • Hypercholesterolemia    • Hypertension    • Myocardial infarction (CMS/HCC)    • JOSE on CPAP    • Stroke syndrome    • Stroke syndrome        Social History     Socioeconomic History   • Marital status:      Spouse name: Not on file   • Number of children: Not on file   • Years of education: Not on file   • Highest education level: Not on file   Tobacco Use   • Smoking status: Former Smoker   • Smokeless tobacco: Never Used   Substance and Sexual Activity   • Alcohol use: No       Family History   Problem Relation Age of Onset   • Heart disease Other    • Diabetes Other    • Cancer Other    • Stroke Other    • Coronary artery disease Mother    • Heart attack Mother    • Coronary artery disease Father        Review of Systems   Constitutional: Negative.    HENT: Negative.    Eyes: Positive for visual disturbance.   Respiratory: Positive for shortness of breath (with any activity and at rest).    Cardiovascular: Positive for chest pain (occas stinging feeling) and palpitations (racing). Negative for leg swelling.   Gastrointestinal: Negative.    Endocrine: Negative.    Genitourinary: Negative.    Musculoskeletal: Positive  "for arthralgias, back pain and neck pain.   Skin: Negative.    Allergic/Immunologic: Negative.    Neurological: Negative.    Hematological: Negative.    Psychiatric/Behavioral: Negative.    All other systems reviewed and are negative.      Objective   Vitals:    06/04/20 0918 06/04/20 0939   BP: (!) 166/104 152/85   BP Location: Left arm Right arm   Patient Position: Sitting Sitting   Pulse: 92 76   Temp: 97.3 °F (36.3 °C)    SpO2: 97%    Weight: 97.2 kg (214 lb 3.2 oz)    Height: 170.2 cm (67\")       /85 (BP Location: Right arm, Patient Position: Sitting)   Pulse 76   Temp 97.3 °F (36.3 °C)   Ht 170.2 cm (67\")   Wt 97.2 kg (214 lb 3.2 oz)   SpO2 97%   BMI 33.55 kg/m²     Lab Results (most recent)     None          Physical Exam   Constitutional: She is oriented to person, place, and time. She appears well-developed and well-nourished. No distress.   HENT:   Head: Normocephalic and atraumatic.   Eyes: Conjunctivae are normal. Right eye exhibits no discharge. Left eye exhibits no discharge. No scleral icterus.   Neck: No JVD present.   Cardiovascular: Normal rate, regular rhythm and normal heart sounds. Exam reveals no gallop and no friction rub.   No murmur heard.  Pulmonary/Chest: Effort normal and breath sounds normal. No respiratory distress. She has no wheezes. She has no rales. She exhibits no tenderness.   Musculoskeletal: Normal range of motion. She exhibits no edema.   Neurological: She is alert and oriented to person, place, and time. No cranial nerve deficit.   Skin: Skin is warm and dry. No rash noted. No erythema. No pallor.   Psychiatric: She has a normal mood and affect. Her behavior is normal.   Nursing note and vitals reviewed.      Procedure     ECG 12 Lead  Date/Time: 6/4/2020 9:29 AM  Performed by: Darryl Ocasio PA  Authorized by: Darryl Ocasio PA   Comparison: compared with previous ECG from 2/6/2019  Comments: EKG demonstrates sinus rhythm at 73 bpm with no acute ST " changes               Assessment/Plan     Problems Addressed this Visit        Cardiovascular and Mediastinum    Coronary artery disease involving native coronary artery of native heart without angina pectoris    Relevant Medications    metoprolol tartrate (LOPRESSOR) 25 MG tablet    isosorbide mononitrate (IMDUR) 30 MG 24 hr tablet    nitroglycerin (Nitrostat) 0.4 MG SL tablet    Other Relevant Orders    Lipid Panel    [facility]    CBC & Differential    Comprehensive Metabolic Panel    Hemoglobin A1c    Essential hypertension - Primary    Relevant Medications    metoprolol tartrate (LOPRESSOR) 25 MG tablet    irbesartan-hydrochlorothiazide (AVALIDE) 150-12.5 MG tablet    Other Relevant Orders    ECG 12 Lead    Lipid Panel    [facility]    CBC & Differential    Comprehensive Metabolic Panel    Hemoglobin A1c    Pure hypercholesterolemia    Relevant Medications    Evolocumab with Infusor 420 MG/3.5ML solution cartridge    Other Relevant Orders    Lipid Panel    [facility]    CBC & Differential    Comprehensive Metabolic Panel    Hemoglobin A1c    Palpitations    Relevant Orders    ECG 12 Lead    Lipid Panel    [facility]    CBC & Differential    Comprehensive Metabolic Panel    Hemoglobin A1c       Respiratory    SOB (shortness of breath)    Relevant Orders    ECG 12 Lead    Lipid Panel    [facility]    CBC & Differential    Comprehensive Metabolic Panel    Hemoglobin A1c       Nervous and Auditory    Chest pain    Relevant Orders    ECG 12 Lead    Lipid Panel    [facility]    CBC & Differential    Comprehensive Metabolic Panel    Hemoglobin A1c      Other Visit Diagnoses     Type 2 diabetes mellitus with diabetic nephropathy, without long-term current use of insulin (CMS/[facility])        Relevant Orders    Hemoglobin A1c          Recommendation  1.  Patient is doing poorly.  Patient has history of coronary disease with multiple interventions and stenting with  history of MI.  She had stress test showing no evidence of ischemia.  She continues to have symptoms despite being on antianginal therapy.  And if she experiences discomfort on metoprolol and isosorbide with resting of chest discomfort, I will schedule catheterization for definitive assessment  2.  We will recheck laboratories.  She has been on Repatha to help with dyslipidemia.  We are going to recheck lipid parameters.  We will see patient back for follow-up after catheterization.  She will follow-up with primary as scheduled           Phyllis Iyer  reports that she has quit smoking. She has never used smokeless tobacco.      Patient's Body mass index is 33.55 kg/m². BMI is above normal parameters. Recommendations include: educational material.       Electronically signed by:

## 2020-06-04 NOTE — PATIENT INSTRUCTIONS

## 2020-06-05 LAB
ALBUMIN SERPL-MCNC: 4.16 G/DL (ref 3.5–5.2)
ALBUMIN/GLOB SERPL: 1.4 G/DL
ALP SERPL-CCNC: 178 U/L (ref 39–117)
ALT SERPL W P-5'-P-CCNC: 27 U/L (ref 1–33)
ANION GAP SERPL CALCULATED.3IONS-SCNC: 11.4 MMOL/L (ref 5–15)
AST SERPL-CCNC: 19 U/L (ref 1–32)
BASOPHILS # BLD AUTO: 0.07 10*3/MM3 (ref 0–0.2)
BASOPHILS NFR BLD AUTO: 0.7 % (ref 0–1.5)
BILIRUB SERPL-MCNC: 0.3 MG/DL (ref 0.2–1.2)
BUN BLD-MCNC: 12 MG/DL (ref 6–20)
BUN/CREAT SERPL: 14.8 (ref 7–25)
CALCIUM SPEC-SCNC: 9.3 MG/DL (ref 8.6–10.5)
CHLORIDE SERPL-SCNC: 100 MMOL/L (ref 98–107)
CHOLEST SERPL-MCNC: 224 MG/DL (ref 0–200)
CO2 SERPL-SCNC: 23.6 MMOL/L (ref 22–29)
CREAT BLD-MCNC: 0.81 MG/DL (ref 0.57–1)
DEPRECATED RDW RBC AUTO: 45.6 FL (ref 37–54)
EOSINOPHIL # BLD AUTO: 0.08 10*3/MM3 (ref 0–0.4)
EOSINOPHIL NFR BLD AUTO: 0.8 % (ref 0.3–6.2)
ERYTHROCYTE [DISTWIDTH] IN BLOOD BY AUTOMATED COUNT: 13.9 % (ref 12.3–15.4)
GFR SERPL CREATININE-BSD FRML MDRD: 73 ML/MIN/1.73
GLOBULIN UR ELPH-MCNC: 3 GM/DL
GLUCOSE BLD-MCNC: 212 MG/DL (ref 65–99)
HBA1C MFR BLD: 9.8 % (ref 4.8–5.6)
HCT VFR BLD AUTO: 44.6 % (ref 34–46.6)
HDLC SERPL-MCNC: 47 MG/DL (ref 40–60)
HGB BLD-MCNC: 13.3 G/DL (ref 12–15.9)
IMM GRANULOCYTES # BLD AUTO: 0.02 10*3/MM3 (ref 0–0.05)
IMM GRANULOCYTES NFR BLD AUTO: 0.2 % (ref 0–0.5)
LDLC SERPL CALC-MCNC: 149 MG/DL (ref 0–100)
LDLC/HDLC SERPL: 3.17 {RATIO}
LYMPHOCYTES # BLD AUTO: 3.57 10*3/MM3 (ref 0.7–3.1)
LYMPHOCYTES NFR BLD AUTO: 36.3 % (ref 19.6–45.3)
MCH RBC QN AUTO: 26.8 PG (ref 26.6–33)
MCHC RBC AUTO-ENTMCNC: 29.8 G/DL (ref 31.5–35.7)
MCV RBC AUTO: 89.7 FL (ref 79–97)
MONOCYTES # BLD AUTO: 0.49 10*3/MM3 (ref 0.1–0.9)
MONOCYTES NFR BLD AUTO: 5 % (ref 5–12)
NEUTROPHILS # BLD AUTO: 5.6 10*3/MM3 (ref 1.7–7)
NEUTROPHILS NFR BLD AUTO: 57 % (ref 42.7–76)
NRBC BLD AUTO-RTO: 0 /100 WBC (ref 0–0.2)
PLATELET # BLD AUTO: 396 10*3/MM3 (ref 140–450)
PMV BLD AUTO: 10.3 FL (ref 6–12)
POTASSIUM BLD-SCNC: 4 MMOL/L (ref 3.5–5.2)
PROT SERPL-MCNC: 7.2 G/DL (ref 6–8.5)
RBC # BLD AUTO: 4.97 10*6/MM3 (ref 3.77–5.28)
SODIUM BLD-SCNC: 135 MMOL/L (ref 136–145)
TRIGL SERPL-MCNC: 140 MG/DL (ref 0–150)
VLDLC SERPL-MCNC: 28 MG/DL
WBC NRBC COR # BLD: 9.83 10*3/MM3 (ref 3.4–10.8)

## 2020-06-08 ENCOUNTER — TELEPHONE (OUTPATIENT)
Dept: CARDIOLOGY | Facility: CLINIC | Age: 55
End: 2020-06-08

## 2020-06-08 ENCOUNTER — PRIOR AUTHORIZATION (OUTPATIENT)
Dept: CARDIOLOGY | Facility: CLINIC | Age: 55
End: 2020-06-08

## 2020-06-08 NOTE — TELEPHONE ENCOUNTER
Patient aware of lab results. Copy faxed to PCP. Patient currently not taking Repatha due to prior authorization. Labs needed for PA. Julia Peters MA    ----- Message from LUCY Kern sent at 6/8/2020  3:48 PM EDT -----  See if patient was eating the day that she had her cholesterol drawn.  According to her med list, she is taking Repatha and 80 mg of Lipitor with an LDL of 149.  Something does not seem right.  Also can you send patient's hemoglobin A1c and fax those results to primary

## 2020-06-08 NOTE — TELEPHONE ENCOUNTER
Repatha PA approval  CaseId:[id number]  Status:Approved  Review Type:Prior Auth  Coverage Start Date:05/09/2020  Coverage End Date:06/08/2021  Julia Peters MA

## 2020-06-18 ENCOUNTER — OUTSIDE FACILITY SERVICE (OUTPATIENT)
Dept: CARDIOLOGY | Facility: CLINIC | Age: 55
End: 2020-06-18

## 2020-06-18 PROCEDURE — 92978 ENDOLUMINL IVUS OCT C 1ST: CPT | Performed by: INTERNAL MEDICINE

## 2020-06-18 PROCEDURE — 92928 PRQ TCAT PLMT NTRAC ST 1 LES: CPT | Performed by: INTERNAL MEDICINE

## 2020-06-18 PROCEDURE — 93458 L HRT ARTERY/VENTRICLE ANGIO: CPT | Performed by: INTERNAL MEDICINE

## 2020-07-01 ENCOUNTER — OFFICE VISIT (OUTPATIENT)
Dept: CARDIOLOGY | Facility: CLINIC | Age: 55
End: 2020-07-01

## 2020-07-01 VITALS
SYSTOLIC BLOOD PRESSURE: 145 MMHG | HEIGHT: 67 IN | BODY MASS INDEX: 34.28 KG/M2 | OXYGEN SATURATION: 95 % | DIASTOLIC BLOOD PRESSURE: 89 MMHG | WEIGHT: 218.4 LBS | HEART RATE: 66 BPM

## 2020-07-01 DIAGNOSIS — E78.00 PURE HYPERCHOLESTEROLEMIA: ICD-10-CM

## 2020-07-01 DIAGNOSIS — I25.10 CORONARY ARTERY DISEASE INVOLVING NATIVE CORONARY ARTERY OF NATIVE HEART WITHOUT ANGINA PECTORIS: Primary | ICD-10-CM

## 2020-07-01 DIAGNOSIS — I10 ESSENTIAL HYPERTENSION: ICD-10-CM

## 2020-07-01 PROCEDURE — 99213 OFFICE O/P EST LOW 20 MIN: CPT | Performed by: PHYSICIAN ASSISTANT

## 2020-07-01 NOTE — PATIENT INSTRUCTIONS
How to Quarantine at Home  Information for Patients and Families    These instructions are for people with confirmed or suspected COVID-19 who do not need to be hospitalized and those with confirmed COVID-19 who were hospitalized and discharged to care for themselves at home.    If you were tested through the Health Department  The Health Department will monitor your wellbeing.  If it is determined that you do not need to be hospitalized and can be isolated at home, you will be monitored by staff from your local or state health department.     If you were tested through a Commercial [facility]  You will need to monitor yourself and report changes in your symptoms to your doctor.  See the section below called Monitor Your Symptoms.    Follow these steps until a healthcare provider or local or state health department says you can return to your normal activities.    Stay home except to get medical care  • Restrict activities outside your home, except for getting medical care.   • Do not go to work, school, or public areas.   • Avoid using public transportation, ride-sharing, or taxis.    Separate yourself from other people and animals in your home  People  As much as possible, you should stay in a specific room and away from other people in your home. Also, you should use a separate bathroom, if available.    Animals  You should restrict contact with pets and other animals while you are sick with COVID-19, just like you would around other people. When possible, have another member of your household care for your animals while you are sick. If you are sick with COVID-19, avoid contact with your pet, including petting, snuggling, being kissed or licked, and sharing food. If you must care for your pet or be around animals while you are sick, wash your hands before and after you interact with pets and wear a facemask. See COVID-19 and Animals for more information.    Call ahead before visiting your doctor  If you have a medical  appointment, call the healthcare provider and tell them that you have or may have COVID-19. This information will help the healthcare provider’s office take steps to keep other people from getting infected or exposed.    Wear a facemask  You should wear a facemask when you are around other people (e.g., sharing a room or vehicle) or pets and before you enter a healthcare provider’s office.     If you are not able to wear a facemask (for example, because it causes trouble breathing), then people who live with you should not stay in the same room with you, or they should wear a facemask if they enter your room.    Cover your coughs and sneezes  • Cover your mouth and nose with a tissue when you cough or sneeze.   • Throw used tissues in a lined trash can.   • Immediately wash your hands with soap and water for at least 20 seconds or, if soap and water are not available, clean your hands with an alcohol-based hand  that contains at least 60% alcohol.    Clean your hands often  • Wash your hands often with soap and water for at least 20 seconds, especially after blowing your nose, coughing, or sneezing; going to the bathroom; and before eating or preparing food.     • If soap and water are not readily available, use an alcohol-based hand  with at least 60% alcohol, covering all surfaces of your hands and rubbing them together until they feel dry.    • Soap and water are the best option if hands are visibly dirty. Avoid touching your eyes, nose, and mouth with unwashed hands.    Avoid sharing personal household items  • You should not share dishes, drinking glasses, cups, eating utensils, towels, or bedding with other people or pets in your home.   • After using these items, they should be washed thoroughly with soap and water.    Clean all “high-touch” surfaces everyday  • High touch surfaces include counters, tabletops, doorknobs, bathroom fixtures, toilets, phones, keyboards, tablets, and bedside  tables.   • Also, clean any surfaces that may have blood, stool, or body fluids on them.   • Use a household cleaning spray or wipe, according to the label instructions. Labels contain instructions for safe and effective use of the cleaning product, including precautions you should take when applying the product, such as wearing gloves and making sure you have good ventilation during use of the product.    Monitor your symptoms  • Seek prompt medical attention if your illness is worsening (e.g., difficulty breathing).   • Before seeking care, call your healthcare provider and tell them that you have, or are being evaluated for, COVID-19.   • Put on a facemask before you enter the facility.     • These steps will help the healthcare provider’s office to keep other people in the office or waiting room from getting infected or exposed.   • Persons who are placed under active monitoring or facilitated self-monitoring should follow instructions provided by their local health department or occupational health professionals, as appropriate.  • If you have a medical emergency and need to call 911, notify the dispatch personnel that you have, or are being evaluated for COVID-19. If possible, put on a facemask before emergency medical services arrive.    Discontinuing home isolation  Patients with confirmed COVID-19 should remain under home isolation precautions until the risk of secondary transmission to others is thought to be low. The decision to discontinue home isolation precautions should be made on a case-by-case basis, in consultation with healthcare providers and state and local health departments.    The below content are for household members, intimate partners, and caregivers of a patient with symptomatic laboratory-confirmed COVID-19 or a patient under investigation:    Household members, intimate partners, and caregivers may have close contact with a person with symptomatic, laboratory-confirmed COVID-19 or a  person under investigation.     Close contacts should monitor their health; they should call their healthcare provider right away if they develop symptoms suggestive of COVID-19 (e.g., fever, cough, shortness of breath)     Close contacts should also follow these recommendations:  • Make sure that you understand and can help the patient follow their healthcare provider’s instructions for medication(s) and care. You should help the patient with basic needs in the home and provide support for getting groceries, prescriptions, and other personal needs.  • Monitor the patient’s symptoms. If the patient is getting sicker, call his or her healthcare provider and tell them that the patient has laboratory-confirmed COVID-19. This will help the healthcare provider’s office take steps to keep other people in the office or waiting room from getting infected. Ask the healthcare provider to call the local or [facility] health department for additional guidance. If the patient has a medical emergency and you need to call 911, notify the dispatch personnel that the patient has, or is being evaluated for COVID-19.  • Household members should stay in another room or be  from the patient as much as possible. Household members should use a separate bedroom and bathroom, if available.  • Prohibit visitors who do not have an essential need to be in the home.  • Household members should care for any pets in the home. Do not handle pets or other animals while sick.  For more information, see COVID-19 and Animals.  • Make sure that shared spaces in the home have good air flow, such as by an air conditioner or an opened window, weather permitting.  • Perform hand hygiene frequently. Wash your hands often with soap and water for at least 20 seconds or use an alcohol-based hand  that contains 60 to 95% alcohol, covering all surfaces of your hands and rubbing them together until they feel dry. Soap and water should be used  preferentially if hands are visibly dirty.  • Avoid touching your eyes, nose, and mouth with unwashed hands.  • The patient should wear a facemask when you are around other people. If the patient is not able to wear a facemask (for example, because it causes trouble breathing), you, as the caregiver, should wear a mask when you are in the same room as the patient.  • Wear a disposable facemask and gloves when you touch or have contact with the patient’s blood, stool, or body fluids, such as saliva, sputum, nasal mucus, vomit, or urine.   o Throw out disposable facemasks and gloves after using them. Do not reuse.  o When removing personal protective equipment, first remove and dispose of gloves. Then, immediately clean your hands with soap and water or alcohol-based hand . Next, remove and dispose of facemask, and immediately clean your hands again with soap and water or alcohol-based hand .  • Avoid sharing household items with the patient. You should not share dishes, drinking glasses, cups, eating utensils, towels, bedding, or other items. After the patient uses these items, you should wash them thoroughly (see below “Wash laundry thoroughly”).  • Clean all “high-touch” surfaces, such as counters, tabletops, doorknobs, bathroom fixtures, toilets, phones, keyboards, tablets, and bedside tables, every day. Also, clean any surfaces that may have blood, stool, or body fluids on them.   o Use a household cleaning spray or wipe, according to the label instructions. Labels contain instructions for safe and effective use of the cleaning product including precautions you should take when applying the product, such as wearing gloves and making sure you have good ventilation during use of the product.  • Wash laundry thoroughly.   o Immediately remove and wash clothes or bedding that have blood, stool, or body fluids on them.  o Wear disposable gloves while handling soiled items and keep soiled items away  from your body. Clean your hands (with soap and water or an alcohol-based hand ) immediately after removing your gloves.  o Read and follow directions on labels of laundry or clothing items and detergent. In general, using a normal laundry detergent according to washing machine instructions and dry thoroughly using the warmest temperatures recommended on the clothing label.  • Place all used disposable gloves, facemasks, and other contaminated items in a lined container before disposing of them with other household waste. Clean your hands (with soap and water or an alcohol-based hand ) immediately after handling these items. Soap and water should be used preferentially if hands are visibly dirty.  • Discuss any additional questions with your state or local health department or healthcare provider.    Adapted from information provided by the Centers for Disease Control and Prevention.  For more information, visit https://www.cdc.gov/coronavirus/2019-ncov/hcp/guidance-prevent-spread.htmlFat and Cholesterol Restricted Eating Plan  Getting too much fat and cholesterol in your diet may cause health problems. Choosing the right foods helps keep your fat and cholesterol at normal levels. This can keep you from getting certain diseases.  Your doctor may recommend an eating plan that includes:  · Total fat: ______% or less of total calories a day.  · Saturated fat: ______% or less of total calories a day.  · Cholesterol: less than _________mg a day.  · Fiber: ______g a day.  What are tips for following this plan?  Meal planning  · At meals, divide your plate into four equal parts:  ? Fill one-half of your plate with vegetables and green salads.  ? Fill one-fourth of your plate with whole grains.  ? Fill one-fourth of your plate with low-fat (lean) protein foods.  · Eat fish that is high in omega-3 fats at least two times a week. This includes mackerel, tuna, sardines, and salmon.  · Eat foods that are high  "in fiber, such as whole grains, beans, apples, broccoli, carrots, peas, and barley.  General tips    · Work with your doctor to lose weight if you need to.  · Avoid:  ? Foods with added sugar.  ? Fried foods.  ? Foods with partially hydrogenated oils.  · Limit alcohol intake to no more than 1 drink a day for nonpregnant women and 2 drinks a day for men. One drink equals 12 oz of beer, 5 oz of wine, or 1½ oz of hard liquor.  Reading food labels  · Check food labels for:  ? Trans fats.  ? Partially hydrogenated oils.  ? Saturated fat (g) in each serving.  ? Cholesterol (mg) in each serving.  ? Fiber (g) in each serving.  · Choose foods with healthy fats, such as:  ? Monounsaturated fats.  ? Polyunsaturated fats.  ? Omega-3 fats.  · Choose grain products that have whole grains. Look for the word \"whole\" as the first word in the ingredient list.  Cooking  · Cook foods using low-fat methods. These include baking, boiling, grilling, and broiling.  · Eat more home-cooked foods. Eat at restaurants and buffets less often.  · Avoid cooking using saturated fats, such as butter, cream, palm oil, palm kernel oil, and coconut oil.  Recommended foods    Fruits  · All fresh, canned (in natural juice), or frozen fruits.  Vegetables  · Fresh or frozen vegetables (raw, steamed, roasted, or grilled). Green salads.  Grains  · Whole grains, such as whole wheat or whole grain breads, crackers, cereals, and pasta. Unsweetened oatmeal, bulgur, barley, quinoa, or brown rice. Corn or whole wheat flour tortillas.  Meats and other protein foods  · Ground beef (85% or leaner), grass-fed beef, or beef trimmed of fat. Skinless chicken or turkey. Ground chicken or turkey. Pork trimmed of fat. All fish and seafood. Egg whites. Dried beans, peas, or lentils. Unsalted nuts or seeds. Unsalted canned beans. Nut butters without added sugar or oil.  Dairy  · Low-fat or nonfat dairy products, such as skim or 1% milk, 2% or reduced-fat cheeses, low-fat and " fat-free ricotta or cottage cheese, or plain low-fat and nonfat yogurt.  Fats and oils  · Tub margarine without trans fats. Light or reduced-fat mayonnaise and salad dressings. Avocado. Olive, canola, sesame, or safflower oils.  The items listed above may not be a complete list of foods and beverages you can eat. Contact a dietitian for more information.  Foods to avoid  Fruits  · Canned fruit in heavy syrup. Fruit in cream or butter sauce. Fried fruit.  Vegetables  · Vegetables cooked in cheese, cream, or butter sauce. Fried vegetables.  Grains  · White bread. White pasta. White rice. Cornbread. Bagels, pastries, and croissants. Crackers and snack foods that contain trans fat and hydrogenated oils.  Meats and other protein foods  · Fatty cuts of meat. Ribs, chicken wings, amos, sausage, bologna, salami, chitterlings, fatback, hot dogs, bratwurst, and packaged lunch meats. Liver and organ meats. Whole eggs and egg yolks. Chicken and turkey with skin. Fried meat.  Dairy  · Whole or 2% milk, cream, half-and-half, and cream cheese. Whole milk cheeses. Whole-fat or sweetened yogurt. Full-fat cheeses. Nondairy creamers and whipped toppings. Processed cheese, cheese spreads, and cheese curds.  Beverages  · Alcohol. Sugar-sweetened drinks such as sodas, lemonade, and fruit drinks.  Fats and oils  · Butter, stick margarine, lard, shortening, ghee, or amos fat. Coconut, palm kernel, and palm oils.  Sweets and desserts  · Corn syrup, sugars, honey, and molasses. Candy. Jam and jelly. Syrup. Sweetened cereals. Cookies, pies, cakes, donuts, muffins, and ice cream.  The items listed above may not be a complete list of foods and beverages you should avoid. Contact a dietitian for more information.  Summary  · Choosing the right foods helps keep your fat and cholesterol at normal levels. This can keep you from getting certain diseases.  · At meals, fill one-half of your plate with vegetables and green salads.  · Eat high-fiber  "foods, like whole grains, beans, apples, carrots, peas, and barley.  · Limit added sugar, saturated fats, alcohol, and fried foods.  This information is not intended to replace advice given to you by your health care provider. Make sure you discuss any questions you have with your health care provider.  Document Released: 06/18/2013 Document Revised: 08/21/2019 Document Reviewed: 09/04/2018  [employer] Patient Education © 2020 [employer]  BMI for Adults    Body mass index (BMI) is a number that is calculated from a person's weight and height. BMI may help to estimate how much of a person's weight is composed of fat. BMI can help identify those who may be at higher risk for certain medical problems.  How is BMI used with adults?  BMI is used as a screening tool to identify possible weight problems. It is used to check whether a person is obese, overweight, healthy weight, or underweight.  How is BMI calculated?  BMI measures your weight and compares it to your height. This can be done either in English ([place] or metric measurements. Note that charts are available to help you find your BMI quickly and easily without having to do these calculations yourself.  To calculate your BMI in English ([place] measurements, your health care provider will:  1. Measure your weight in pounds (lb).  2. Multiply the number of pounds by 703.  ? For example, for a person who weighs 180 lb, multiply that number by 703, which equals 126,540.  3. Measure your height in inches (in). Then multiply that number by itself to get a measurement called \"inches squared.\"  ? For example, for a person who is 70 in tall, the \"inches squared\" measurement is 70 in x 70 in, which equals 4900 inches squared.  4. Divide the total from Step 2 (number of lb x 703) by the total from Step 3 (inches squared): 126,540 ÷ 4900 = 25.8. This is your BMI.  To calculate your BMI in metric measurements, your health care provider will:  1. Measure your weight in " "kilograms (kg).  2. Measure your height in meters (m). Then multiply that number by itself to get a measurement called \"meters squared.\"  ? For example, for a person who is 1.75 m tall, the \"meters squared\" measurement is 1.75 m x 1.75 m, which is equal to 3.1 meters squared.  3. Divide the number of kilograms (your weight) by the meters squared number. In this example: 70 ÷ 3.1 = 22.6. This is your BMI.  How is BMI interpreted?  To interpret your results, your health care provider will use BMI charts to identify whether you are underweight, normal weight, overweight, or obese. The following guidelines will be used:  · Underweight: BMI less than 18.5.  · Normal weight: BMI between 18.5 and 24.9.  · Overweight: BMI between 25 and 29.9.  · Obese: BMI of 30 and above.  Please note:  · Weight includes both fat and muscle, so someone with a muscular build, such as an athlete, may have a BMI that is higher than 24.9. In cases like these, BMI is not an accurate measure of body fat.  · To determine if excess body fat is the cause of a BMI of 25 or higher, further assessments may need to be done by a health care provider.  · BMI is usually interpreted in the same way for men and women.  Why is BMI a useful tool?  BMI is useful in two ways:  · Identifying a weight problem that may be related to a medical condition, or that may increase the risk for medical problems.  · Promoting lifestyle and diet changes in order to reach a healthy weight.  Summary  · Body mass index (BMI) is a number that is calculated from a person's weight and height.  · BMI may help to estimate how much of a person's weight is composed of fat. BMI can help identify those who may be at higher risk for certain medical problems.  · BMI can be measured using English measurements or metric measurements.  · To interpret your results, your health care provider will use BMI charts to identify whether you are underweight, normal weight, overweight, or " obese.  This information is not intended to replace advice given to you by your health care provider. Make sure you discuss any questions you have with your health care provider.  Document Released: 08/29/2005 Document Revised: 11/30/2018 Document Reviewed: 10/31/2018  [employer] Patient Education © 2020 [employer]

## 2020-07-01 NOTE — PROGRESS NOTES
Problem list     Subjective   Phyllis Iyer is a 55 y.o. female     Chief Complaint   Patient presents with   • Follow-up   Problem list:     1. CAD  1.1 cardiac catheterization in 2011 with stenting to the mid LAD as well as proximal, mid, and distal RCA with medical management recommended  1.2 left heart catheterization January 2016 with stenting of the right coronary artery with nonobstructive disease otherwise  1.3 stress test November 2016 but no evidence ischemia and preserved LV function  1.4 cardiac catheterization 5/4/2018 by Dr. Malik because of non-ST elevation myocardial infarction demonstrating high-grade RCA disease status post stenting ×2.  60% IntraStent restenosis of the LAD with medical management recommended.  Normal LV function noted  1.5 cardiac catheterization July 2018 with stenting of the LAD because of refractory angina.  30 to 50% of the circumflex and RCA with medical management recommended  0.6 cardiac catheterization June 2020 because of unstable angina with stenting x1 of the RCA for subtotal stenosis.  Residual disease of the LAD 50% with an 80 to 90% diagonal.  Nonobstructive disease otherwise with medical management recommended  2.  Preserved systolic function  3.  Hypertension  4.  Dyslipidemia  5.  Diabetes mellitus  6.  Obstructive sleep apnea noncompliant with CPAP therapy    HPI  Pt is a 56y/o female presenting today for follow up after a cath and placement of a stent in RCA. Pt reports significant improvement of symptoms following stent placement. She denies any chest pain, SOB, dyspnea on exertion, lower extremity edema. She does report orthopnea when she goes to bed but that has been improving steadily since the stent placement.   She is stable on Plavix. No excessive bruising, melena, hemoptysis, or hematochezia.   She is continuing with Lipitor and Rapatha.     Current Outpatient Medications on File Prior to Visit   Medication Sig Dispense Refill   • aspirin 81 MG tablet  Take 1 tablet by mouth Daily. 30 tablet 11   • atorvastatin (LIPITOR) 80 MG tablet TAKE ONE TABLET BY MOUTH DAILY 30 tablet 11   • clopidogrel (PLAVIX) 75 MG tablet Take 1 tablet by mouth Daily. 90 tablet 3   • Evolocumab with Infusor 420 MG/3.5ML solution cartridge Inject 420 mg under the skin into the appropriate area as directed Every 30 (Thirty) Days. 1 cartridge. 11   • FLECTOR 1.3 % patch patch      • gabapentin (NEURONTIN) 600 MG tablet      • HYDROcodone-acetaminophen (NORCO) 7.5-325 MG per tablet Take 1 tablet by mouth Every 6 (Six) Hours As Needed for Moderate Pain (4-6).     • ibuprofen (ADVIL,MOTRIN) 800 MG tablet TK 1 T PO TID     • irbesartan-hydrochlorothiazide (AVALIDE) 150-12.5 MG tablet Take 1 tablet by mouth Daily. 30 tablet 5   • isosorbide mononitrate (IMDUR) 30 MG 24 hr tablet Take 1 tablet by mouth Every Morning. 30 tablet 11   • LUMIGAN 0.01 % ophthalmic drops      • metoprolol tartrate (LOPRESSOR) 25 MG tablet Take 1 tablet by mouth 2 (Two) Times a Day. 180 tablet 3   • nitroglycerin (Nitrostat) 0.4 MG SL tablet Place 1 tablet under the tongue Every 5 (Five) Minutes As Needed for Chest Pain. prn 25 tablet 1   • pantoprazole (PROTONIX) 40 MG EC tablet 2 (Two) Times a Day.     • promethazine (PHENERGAN) 25 MG tablet Take 25 mg by mouth Every 6 (Six) Hours As Needed for Nausea or Vomiting.     • traZODone (DESYREL) 150 MG tablet TAKE ONE TABLET BY MOUTH EVERY NIGHT 30 tablet 4   • TRESIBA FLEXTOUCH 100 UNIT/ML solution pen-injector injection Daily.       No current facility-administered medications on file prior to visit.        Sulfa antibiotics    Past Medical History:   Diagnosis Date   • Arthritis    • CAD (coronary artery disease)    • Chest tightness or pressure    • Complex partial seizure (CMS/HCC)    • Diabetes mellitus (CMS/HCC)    • Dyslipidemia    • FHx: migraine headaches    • GERD (gastroesophageal reflux disease)    • Hiatal hernia    • Hypercholesterolemia    • Hypertension    •  "Myocardial infarction (CMS/HCC)    • JOSE on CPAP    • Stroke syndrome    • Stroke syndrome        Social History     Socioeconomic History   • Marital status:      Spouse name: Not on file   • Number of children: Not on file   • Years of education: Not on file   • Highest education level: Not on file   Tobacco Use   • Smoking status: Former Smoker   • Smokeless tobacco: Never Used   Substance and Sexual Activity   • Alcohol use: No       Family History   Problem Relation Age of Onset   • Heart disease Other    • Diabetes Other    • Cancer Other    • Stroke Other    • Coronary artery disease Mother    • Heart attack Mother    • Coronary artery disease Father        Review of Systems   HENT: Negative for congestion, rhinorrhea and sore throat.    Eyes: Positive for visual disturbance (glasses daily ).   Respiratory: Positive for shortness of breath (w/ normal daily activity ). Negative for chest tightness.    Cardiovascular: Negative for chest pain (Denies CP), palpitations and leg swelling.   Gastrointestinal: Negative for abdominal pain, blood in stool, constipation, diarrhea, nausea and vomiting.   Endocrine: Positive for cold intolerance (back and forth ) and heat intolerance (back and forth ).   Genitourinary: Negative for difficulty urinating, dysuria, frequency, hematuria and urgency.   Musculoskeletal: Positive for arthralgias and back pain. Negative for neck pain.   Skin: Negative for rash and wound.   Allergic/Immunologic: Negative for environmental allergies and food allergies.   Neurological: Positive for headaches (thinks it's related to sinuses ). Negative for dizziness, syncope, weakness, light-headedness and numbness.   Hematological: Bruises/bleeds easily (bruises).   Psychiatric/Behavioral: Positive for sleep disturbance (Issues falling asleep ).       Objective   Vitals:    07/01/20 1303   BP: 145/89   Pulse: 66   SpO2: 95%   Weight: 99.1 kg (218 lb 6.4 oz)   Height: 170.2 cm (67\")      BP " "145/89   Pulse 66   Ht 170.2 cm (67\")   Wt 99.1 kg (218 lb 6.4 oz)   SpO2 95%   BMI 34.21 kg/m²     Lab Results (most recent)     None          Physical Exam   Constitutional: She is oriented to person, place, and time. She appears well-developed and well-nourished. No distress.   HENT:   Head: Normocephalic and atraumatic.   Eyes: Pupils are equal, round, and reactive to light. EOM are normal.   Cardiovascular: Normal rate, regular rhythm, normal heart sounds and intact distal pulses. Exam reveals no gallop and no friction rub.   No murmur heard.  Pulmonary/Chest: Effort normal and breath sounds normal. No respiratory distress. She has no wheezes. She has no rales. She exhibits no tenderness.   Abdominal: Soft. She exhibits no distension and no mass. There is no tenderness. There is no rebound and no guarding.   Musculoskeletal: Normal range of motion. She exhibits no edema.   Neurological: She is alert and oriented to person, place, and time. No cranial nerve deficit.   Skin: Skin is warm and dry. No rash noted. No erythema. No pallor.   Psychiatric: She has a normal mood and affect. Her behavior is normal.       Procedure   Procedures       Assessment/Plan     Problems Addressed this Visit        Cardiovascular and Mediastinum    Coronary artery disease involving native coronary artery of native heart without angina pectoris - Primary    Essential hypertension    Pure hypercholesterolemia        1. CAD - significant improvement s/p stent placement in the RCA.  Nonobstructive disease noted in residual 50% LAD stenosis.  Plan will be to continue current medication therapy and follow up in 6 months.   2. HTN -  Continue Metoprolol   3. Pure hypercholesterolemia - continue Lipitor and Repatha, will need to recheck level soon.             Phyllis GEOFFREY Iyer  reports that she has quit smoking. She has never used smokeless tobacco.        Patient's Body mass index is 34.21 kg/m². BMI is above normal parameters. " Recommendations include: educational material.       Electronically signed by:

## 2020-08-24 RX ORDER — TRAZODONE HYDROCHLORIDE 150 MG/1
TABLET ORAL
Qty: 30 TABLET | Refills: 3 | OUTPATIENT
Start: 2020-08-24

## 2020-08-24 NOTE — TELEPHONE ENCOUNTER
Refill request for Trazodone denied with msg that they need to contact PCP for this.  RASHMI SHEPARD

## 2021-03-01 ENCOUNTER — OFFICE VISIT (OUTPATIENT)
Dept: CARDIOLOGY | Facility: CLINIC | Age: 56
End: 2021-03-01

## 2021-03-01 VITALS
SYSTOLIC BLOOD PRESSURE: 107 MMHG | OXYGEN SATURATION: 95 % | BODY MASS INDEX: 33.9 KG/M2 | HEIGHT: 67 IN | DIASTOLIC BLOOD PRESSURE: 64 MMHG | HEART RATE: 72 BPM | WEIGHT: 216 LBS

## 2021-03-01 DIAGNOSIS — R06.02 SOB (SHORTNESS OF BREATH): ICD-10-CM

## 2021-03-01 DIAGNOSIS — R07.9 CHEST PAIN, UNSPECIFIED TYPE: ICD-10-CM

## 2021-03-01 DIAGNOSIS — I25.10 CORONARY ARTERY DISEASE INVOLVING NATIVE CORONARY ARTERY OF NATIVE HEART WITHOUT ANGINA PECTORIS: Primary | ICD-10-CM

## 2021-03-01 DIAGNOSIS — I10 ESSENTIAL HYPERTENSION: ICD-10-CM

## 2021-03-01 PROCEDURE — 99214 OFFICE O/P EST MOD 30 MIN: CPT | Performed by: PHYSICIAN ASSISTANT

## 2021-03-01 RX ORDER — DULAGLUTIDE 0.75 MG/.5ML
INJECTION, SOLUTION SUBCUTANEOUS
COMMUNITY
End: 2021-08-23

## 2021-03-01 RX ORDER — OXCARBAZEPINE 600 MG/1
600 TABLET, FILM COATED ORAL 2 TIMES DAILY
COMMUNITY
End: 2021-06-10

## 2021-03-01 RX ORDER — OMEPRAZOLE 20 MG/1
20 CAPSULE, DELAYED RELEASE ORAL 2 TIMES DAILY
COMMUNITY
End: 2022-02-22

## 2021-03-01 RX ORDER — DULOXETIN HYDROCHLORIDE 30 MG/1
30 CAPSULE, DELAYED RELEASE ORAL DAILY
COMMUNITY

## 2021-03-01 RX ORDER — ROSUVASTATIN CALCIUM 20 MG/1
20 TABLET, COATED ORAL DAILY
COMMUNITY
End: 2021-04-20

## 2021-03-01 RX ORDER — HYDROCHLOROTHIAZIDE 25 MG/1
25 TABLET ORAL DAILY
COMMUNITY

## 2021-03-01 RX ORDER — FAMOTIDINE 40 MG/1
40 TABLET, FILM COATED ORAL DAILY
COMMUNITY
End: 2022-04-12

## 2021-03-01 NOTE — PROGRESS NOTES
Problem list     Subjective   Phyllis Iyer is a 55 y.o. female     Chief Complaint   Patient presents with   • Follow-up   • Coronary Artery Disease   Problem list:     1. CAD  1.1 cardiac catheterization in 2011 with stenting to the mid LAD as well as proximal, mid, and distal RCA with medical management recommended  1.2 left heart catheterization January 2016 with stenting of the right coronary artery with nonobstructive disease otherwise  1.3 stress test November 2016 but no evidence ischemia and preserved LV function  1.4 cardiac catheterization 5/4/2018 by Dr. Malik because of non-ST elevation myocardial infarction demonstrating high-grade RCA disease status post stenting ×2.  60% IntraStent restenosis of the LAD with medical management recommended.  Normal LV function noted  1.5 cardiac catheterization July 2018 with stenting of the LAD because of refractory angina.  30 to 50% of the circumflex and RCA with medical management recommended  1.6 cardiac catheterization June 2020 because of unstable angina with stenting x1 of the RCA for subtotal stenosis.  Residual disease of the LAD 50% with an 80 to 90% diagonal.  Nonobstructive disease otherwise with medical management recommended  2.  Preserved systolic function  3.  Hypertension  4.  Dyslipidemia  5.  Diabetes mellitus  6.  Obstructive sleep apnea noncompliant with CPAP therapy    HPI    Patient is a 55-year-old female that presents to the office to be evaluated.  She has history of significant coronary disease with recurrent stenosis requiring multiple interventions.    She has done well after previous intervention but has felt similar to what she felt in the past.  She has had discomfort in her chest and around her back.  This is similar but not as intense or as severe as what she had felt previously.  She feels short of breath.  Even doing routine housework she has significant exertional dyspnea.  She does not describe PND orthopnea.    She does not  describe palpitating having dysrhythmic symptoms and otherwise appears to be stable      Current Outpatient Medications on File Prior to Visit   Medication Sig Dispense Refill   • aspirin 81 MG tablet Take 1 tablet by mouth Daily. 30 tablet 11   • clopidogrel (PLAVIX) 75 MG tablet Take 1 tablet by mouth Daily. 90 tablet 3   • Dulaglutide (Trulicity) 0.75 MG/0.5ML solution pen-injector Inject  under the skin into the appropriate area as directed.     • DULoxetine (CYMBALTA) 30 MG capsule Take 30 mg by mouth Daily.     • famotidine (PEPCID) 40 MG tablet Take 40 mg by mouth Daily.     • FLECTOR 1.3 % patch patch      • gabapentin (NEURONTIN) 600 MG tablet 4 (Four) Times a Day.     • hydroCHLOROthiazide (HYDRODIURIL) 25 MG tablet Take 25 mg by mouth Daily.     • HYDROcodone-acetaminophen (NORCO) 7.5-325 MG per tablet Take 1 tablet by mouth Every 6 (Six) Hours As Needed for Moderate Pain (4-6).     • ibuprofen (ADVIL,MOTRIN) 800 MG tablet TK 1 T PO TID     • irbesartan-hydrochlorothiazide (AVALIDE) 150-12.5 MG tablet Take 1 tablet by mouth Daily. 30 tablet 5   • isosorbide mononitrate (IMDUR) 30 MG 24 hr tablet Take 1 tablet by mouth Every Morning. 30 tablet 11   • LUMIGAN 0.01 % ophthalmic drops      • metoprolol tartrate (LOPRESSOR) 25 MG tablet Take 1 tablet by mouth 2 (Two) Times a Day. 180 tablet 3   • nitroglycerin (Nitrostat) 0.4 MG SL tablet Place 1 tablet under the tongue Every 5 (Five) Minutes As Needed for Chest Pain. prn 25 tablet 1   • omeprazole (priLOSEC) 20 MG capsule Take 20 mg by mouth 2 (two) times a day.     • OXcarbazepine (TRILEPTAL) 600 MG tablet Take 600 mg by mouth 2 (Two) Times a Day.     • pantoprazole (PROTONIX) 40 MG EC tablet 2 (Two) Times a Day.     • promethazine (PHENERGAN) 25 MG tablet Take 25 mg by mouth Every 6 (Six) Hours As Needed for Nausea or Vomiting.     • rosuvastatin (CRESTOR) 20 MG tablet Take 20 mg by mouth Daily.     • traZODone (DESYREL) 150 MG tablet TAKE ONE TABLET BY  MOUTH EVERY NIGHT 30 tablet 4   • TRESIBA FLEXTOUCH 100 UNIT/ML solution pen-injector injection 34 Units 2 (two) times a day.     • atorvastatin (LIPITOR) 80 MG tablet TAKE ONE TABLET BY MOUTH DAILY 30 tablet 11   • [DISCONTINUED] Evolocumab with Infusor 420 MG/3.5ML solution cartridge Inject 420 mg under the skin into the appropriate area as directed Every 30 (Thirty) Days. 1 cartridge. 11     No current facility-administered medications on file prior to visit.        Sulfa antibiotics    Past Medical History:   Diagnosis Date   • Arthritis    • CAD (coronary artery disease)    • Chest tightness or pressure    • Complex partial seizure (CMS/HCC)    • Diabetes mellitus (CMS/HCC)    • Dyslipidemia    • FHx: migraine headaches    • GERD (gastroesophageal reflux disease)    • Hiatal hernia    • Hypercholesterolemia    • Hypertension    • Myocardial infarction (CMS/HCC)    • JOSE on CPAP    • Stroke syndrome    • Stroke syndrome        Social History     Socioeconomic History   • Marital status:      Spouse name: Not on file   • Number of children: Not on file   • Years of education: Not on file   • Highest education level: Not on file   Tobacco Use   • Smoking status: Former Smoker   • Smokeless tobacco: Never Used   Substance and Sexual Activity   • Alcohol use: No       Family History   Problem Relation Age of Onset   • Heart disease Other    • Diabetes Other    • Cancer Other    • Stroke Other    • Coronary artery disease Mother    • Heart attack Mother    • Coronary artery disease Father        Review of Systems   Constitutional: Negative.  Negative for chills, fatigue and fever.   HENT: Negative.  Negative for congestion, rhinorrhea and sore throat.    Eyes: Positive for visual disturbance (glasses).   Respiratory: Positive for apnea (noncompliant w/ CPAP), chest tightness and shortness of breath.    Cardiovascular: Negative.  Negative for chest pain, palpitations and leg swelling.   Gastrointestinal:  "Negative.    Endocrine: Negative.    Genitourinary: Negative.    Musculoskeletal: Positive for back pain. Negative for gait problem, neck pain and neck stiffness.   Skin: Negative.  Negative for rash and wound.   Allergic/Immunologic: Negative.  Negative for environmental allergies and food allergies.   Neurological: Negative.  Negative for dizziness, weakness, light-headedness, numbness and headaches.   Hematological: Bruises/bleeds easily.   Psychiatric/Behavioral: Positive for sleep disturbance.       Objective   Vitals:    03/01/21 1505   BP: 107/64   BP Location: Left arm   Patient Position: Sitting   Pulse: 72   SpO2: 95%   Weight: 98 kg (216 lb)   Height: 170.2 cm (67.01\")      /64 (BP Location: Left arm, Patient Position: Sitting)   Pulse 72   Ht 170.2 cm (67.01\")   Wt 98 kg (216 lb)   SpO2 95%   BMI 33.82 kg/m²     Lab Results (most recent)     None          Physical Exam  Vitals signs and nursing note reviewed.   Constitutional:       General: She is not in acute distress.     Appearance: Normal appearance. She is well-developed.   HENT:      Head: Normocephalic and atraumatic.   Eyes:      General: No scleral icterus.        Right eye: No discharge.         Left eye: No discharge.      Conjunctiva/sclera: Conjunctivae normal.   Neck:      Vascular: No carotid bruit.   Cardiovascular:      Rate and Rhythm: Normal rate and regular rhythm.      Heart sounds: Normal heart sounds. No murmur. No friction rub. No gallop.    Pulmonary:      Effort: Pulmonary effort is normal. No respiratory distress.      Breath sounds: Normal breath sounds. No wheezing or rales.   Chest:      Chest wall: No tenderness.   Musculoskeletal:      Right lower leg: No edema.      Left lower leg: No edema.   Skin:     General: Skin is warm and dry.      Coloration: Skin is not pale.      Findings: No erythema or rash.   Neurological:      Mental Status: She is alert and oriented to person, place, and time.      Cranial " Nerves: No cranial nerve deficit.   Psychiatric:         Behavior: Behavior normal.         Procedure   Procedures       Assessment/Plan     Problems Addressed this Visit        Cardiac and Vasculature    Coronary artery disease involving native coronary artery of native heart without angina pectoris - Primary    Relevant Orders    Adult Transthoracic Echo Complete W/ Cont if Necessary Per Protocol    Stress Test With Myocardial Perfusion One Day    Essential hypertension    Relevant Medications    hydroCHLOROthiazide (HYDRODIURIL) 25 MG tablet    Other Relevant Orders    Adult Transthoracic Echo Complete W/ Cont if Necessary Per Protocol    Stress Test With Myocardial Perfusion One Day    Chest pain    Relevant Orders    Adult Transthoracic Echo Complete W/ Cont if Necessary Per Protocol    Stress Test With Myocardial Perfusion One Day       Pulmonary and Pneumonias    SOB (shortness of breath)    Relevant Orders    Adult Transthoracic Echo Complete W/ Cont if Necessary Per Protocol    Stress Test With Myocardial Perfusion One Day      Diagnoses       Codes Comments    Coronary artery disease involving native coronary artery of native heart without angina pectoris    -  Primary ICD-10-CM: I25.10  ICD-9-CM: 414.01     Chest pain, unspecified type     ICD-10-CM: R07.9  ICD-9-CM: 786.50     SOB (shortness of breath)     ICD-10-CM: R06.02  ICD-9-CM: 786.05     Essential hypertension     ICD-10-CM: I10  ICD-9-CM: 401.9           Recommendation  1.  Patient is a 55-year-old female with complaints of significant discomfort and dyspnea.  She has had multivessel stenting procedures performed in the past and now complains of symptoms similar to what she felt with previous stenting.  I do feel repeat evaluation is warranted and in that setting would like to schedule testing    2.  Lexiscan stress test for risk stratification    3. echo to evaluate LV structure, LV function, assess diastolic performance etc.    4.  I do feel  aggression in regards to risk factor modification should be performed.  We had had her on Repatha in the past but apparently this was not continued.  We would like to make sure that this is approved.  She had cholesterol levels in the past year at 149 in regards to her LDL.  We need to get her back on this medication.  I would like to repeat lipid parameters when she is back on Repatha.  She currently is on Crestor.    5.  We will see her back for follow-up after testing.  Any chest pain, not resolved with nitroglycerin, she is to go to the ER.  She is to follow with primary as scheduled           Phyllis Iyer  reports that she has quit smoking. She has never used smokeless tobacco.        Patient's Body mass index is 33.82 kg/m². BMI is above normal parameters. Recommendations include: educational material.       Electronically signed by:

## 2021-03-01 NOTE — PATIENT INSTRUCTIONS
Advance Care Planning and Advance Directives     You make decisions on a daily basis - decisions about where you want to live, your career, your home, your life. Perhaps one of the most important decisions you face is your choice for future medical care. Take time to talk with your family and your healthcare team and start planning today.  Advance Care Planning is a process that can help you:  · Understand possible future healthcare decisions in light of your own experiences  · Reflect on those decision in light of your goals and values  · Discuss your decisions with those closest to you and the healthcare professionals that care for you  · Make a plan by creating a document that reflects your wishes    Surrogate Decision Maker  In the event of a medical emergency, which has left you unable to communicate or to make your own decisions, you would need someone to make decisions for you.  It is important to discuss your preferences for medical treatment with this person while you are in good health.     Qualities of a surrogate decision maker:  • Willing to take on this role and responsibility  • Knows what you want for future medical care  • Willing to follow your wishes even if they don't agree with them  • Able to make difficult medical decisions under stressful circumstances    Advance Directives  These are legal documents you can create that will guide your healthcare team and decision maker(s) when needed. These documents can be stored in the electronic medical record.    · Living Will - a legal document to guide your care if you have a terminal condition or a serious illness and are unable to communicate. States vary by statute in document names/types, but most forms may include one or more of the following:        -  Directions regarding life-prolonging treatments        -  Directions regarding artificially provided nutrition/hydration        -  Choosing a healthcare decision maker        -  Direction  regarding organ/tissue donation    · Durable Power of  for Healthcare - this document names an -in-fact to make medical decisions for you, but it may also allow this person to make personal and financial decisions for you. Please seek the advice of an  if you need this type of document.    **Advance Directives are not required and no one may discriminate against you if you do not sign one.    Medical Orders  Many states allow specific forms/orders signed by your physician to record your wishes for medical treatment in your current state of health. This form, signed in personal communication with your physician, addresses resuscitation and other medical interventions that you may or may not want.      For more information or to schedule a time with a [facility] Advance Care Planning Facilitator contact: [facility] or call [phone] and someone will contact you directly.Heart-Healthy Eating Plan  Heart-healthy meal planning includes:  · Eating less unhealthy fats.  · Eating more healthy fats.  · Making other changes in your diet.  Talk with your doctor or a diet specialist (dietitian) to create an eating plan that is right for you.  What is my plan?  Your doctor may recommend an eating plan that includes:  · Total fat: ______% or less of total calories a day.  · Saturated fat: ______% or less of total calories a day.  · Cholesterol: less than _________mg a day.  What are tips for following this plan?  Cooking  Avoid frying your food. Try to bake, boil, grill, or broil it instead. You can also reduce fat by:  · Removing the skin from poultry.  · Removing all visible fats from meats.  · Steaming vegetables in water or broth.  Meal planning    · At meals, divide your plate into four equal parts:  ? Fill one-half of your plate with vegetables and green salads.  ? Fill one-fourth of your plate with whole grains.  ? Fill one-fourth of your plate with lean protein foods.  · Eat 4-5  servings of vegetables per day. A serving of vegetables is:  ? 1 cup of raw or cooked vegetables.  ? 2 cups of raw leafy greens.  · Eat 4-5 servings of fruit per day. A serving of fruit is:  ? 1 medium whole fruit.  ? ¼ cup of dried fruit.  ? ½ cup of fresh, frozen, or canned fruit.  ? ½ cup of 100% fruit juice.  · Eat more foods that have soluble fiber. These are apples, broccoli, carrots, beans, peas, and barley. Try to get 20-30 g of fiber per day.  · Eat 4-5 servings of nuts, legumes, and seeds per week:  ? 1 serving of dried beans or legumes equals ½ cup after being cooked.  ? 1 serving of nuts is ¼ cup.  ? 1 serving of seeds equals 1 tablespoon.  General information  · Eat more home-cooked food. Eat less restaurant, buffet, and fast food.  · Limit or avoid alcohol.  · Limit foods that are high in starch and sugar.  · Avoid fried foods.  · Lose weight if you are overweight.  · Keep track of how much salt (sodium) you eat. This is important if you have high blood pressure. Ask your doctor to tell you more about this.  · Try to add vegetarian meals each week.  Fats  · Choose healthy fats. These include olive oil and canola oil, flaxseeds, walnuts, almonds, and seeds.  · Eat more omega-3 fats. These include salmon, mackerel, sardines, tuna, flaxseed oil, and ground flaxseeds. Try to eat fish at least 2 times each week.  · Check food labels. Avoid foods with trans fats or high amounts of saturated fat.  · Limit saturated fats.  ? These are often found in animal products, such as meats, butter, and cream.  ? These are also found in plant foods, such as palm oil, palm kernel oil, and coconut oil.  · Avoid foods with partially hydrogenated oils in them. These have trans fats. Examples are stick margarine, some tub margarines, cookies, crackers, and other baked goods.  What foods can I eat?  Fruits  All fresh, canned (in natural juice), or frozen fruits.  Vegetables  Fresh or frozen vegetables (raw, steamed, roasted,  or grilled). Green salads.  Grains  Most grains. Choose whole wheat and whole grains most of the time. Rice and pasta, including brown rice and pastas made with whole wheat.  Meats and other proteins  Lean, well-trimmed beef, veal, pork, and lamb. Chicken and turkey without skin. All fish and shellfish. Wild duck, rabbit, pheasant, and venison. Egg whites or low-cholesterol egg substitutes. Dried beans, peas, lentils, and tofu. Seeds and most nuts.  Dairy  Low-fat or nonfat cheeses, including ricotta and mozzarella. Skim or 1% milk that is liquid, powdered, or evaporated. Buttermilk that is made with low-fat milk. Nonfat or low-fat yogurt.  Fats and oils  Non-hydrogenated (trans-free) margarines. Vegetable oils, including soybean, sesame, sunflower, olive, peanut, safflower, corn, canola, and cottonseed. Salad dressings or mayonnaise made with a vegetable oil.  Beverages  Mineral water. Coffee and tea. Diet carbonated beverages.  Sweets and desserts  Sherbet, gelatin, and fruit ice. Small amounts of dark chocolate.  Limit all sweets and desserts.  Seasonings and condiments  All seasonings and condiments.  The items listed above may not be a complete list of foods and drinks you can eat. Contact a dietitian for more options.  What foods should I avoid?  Fruits  Canned fruit in heavy syrup. Fruit in cream or butter sauce. Fried fruit. Limit coconut.  Vegetables  Vegetables cooked in cheese, cream, or butter sauce. Fried vegetables.  Grains  Breads that are made with saturated or trans fats, oils, or whole milk. Croissants. Sweet rolls. Donuts. High-fat crackers, such as cheese crackers.  Meats and other proteins  Fatty meats, such as hot dogs, ribs, sausage, amos, rib-eye roast or steak. High-fat deli meats, such as salami and bologna. Caviar. Domestic duck and goose. Organ meats, such as liver.  Dairy  Cream, sour cream, cream cheese, and creamed cottage cheese. Whole-milk cheeses. Whole or 2% milk that is liquid,  evaporated, or condensed. Whole buttermilk. Cream sauce or high-fat cheese sauce. Yogurt that is made from whole milk.  Fats and oils  Meat fat, or shortening. Cocoa butter, hydrogenated oils, palm oil, coconut oil, palm kernel oil. Solid fats and shortenings, including amos fat, salt pork, lard, and butter. Nondairy cream substitutes. Salad dressings with cheese or sour cream.  Beverages  Regular sodas and juice drinks with added sugar.  Sweets and desserts  Frosting. Pudding. Cookies. Cakes. Pies. Milk chocolate or white chocolate. Buttered syrups. Full-fat ice cream or ice cream drinks.  The items listed above may not be a complete list of foods and drinks to avoid. Contact a dietitian for more information.  Summary  · Heart-healthy meal planning includes eating less unhealthy fats, eating more healthy fats, and making other changes in your diet.  · Eat a balanced diet. This includes fruits and vegetables, low-fat or nonfat dairy, lean protein, nuts and legumes, whole grains, and heart-healthy oils and fats.  This information is not intended to replace advice given to you by your health care provider. Make sure you discuss any questions you have with your health care provider.  Document Revised: 02/21/2019 Document Reviewed: 01/25/2019  [employer] Patient Education © 2020 [employer]

## 2021-03-16 ENCOUNTER — TELEPHONE (OUTPATIENT)
Dept: CARDIOLOGY | Facility: CLINIC | Age: 56
End: 2021-03-16

## 2021-03-16 DIAGNOSIS — I10 ESSENTIAL HYPERTENSION: ICD-10-CM

## 2021-03-16 DIAGNOSIS — Z01.818 PREPROCEDURAL EXAMINATION: ICD-10-CM

## 2021-03-16 DIAGNOSIS — R07.9 CHEST PAIN, UNSPECIFIED TYPE: ICD-10-CM

## 2021-03-16 DIAGNOSIS — R06.02 SOB (SHORTNESS OF BREATH): ICD-10-CM

## 2021-03-16 DIAGNOSIS — I25.10 CORONARY ARTERY DISEASE INVOLVING NATIVE CORONARY ARTERY OF NATIVE HEART WITHOUT ANGINA PECTORIS: Primary | ICD-10-CM

## 2021-03-16 RX ORDER — ISOSORBIDE MONONITRATE 60 MG/1
60 TABLET, EXTENDED RELEASE ORAL DAILY
Qty: 30 TABLET | Refills: 2 | Status: SHIPPED | OUTPATIENT
Start: 2021-03-16 | End: 2021-07-12

## 2021-03-16 NOTE — TELEPHONE ENCOUNTER
Patient called triage - stated she feels like she is smothering all the time she does feel like she felt when she had the other blockages - rubia chest pain but every now and then she does get a sharp pain  On the left side that radiates from her side up to her chest , has been feeling sick can not eat , has no energy and has had upset stomach.     PCP appt next week    Stress and Echo 4/8/21       AT [facility]       Spoke to Darryl AYALA - increasing Isosorbide 30 mg up to 60 mg daily , we are going to try an get a Cath approved and Julia is going to work on getting PA for Repatha.    Patient verbalized understanding     AT [facility]

## 2021-03-16 NOTE — TELEPHONE ENCOUNTER
Order placed for labs, cath, and COVID. TEJ SALAS.   Attempted to call patient w/ cath info.   Mailed cath instructions w/ orders. TEJ SALAS.         Patient called triage - stated she feels like she is smothering all the time she does feel like she felt when she had the other blockages - rubia chest pain but every now and then she does get a sharp pain  On the left side that radiates from her side up to her chest , has been feeling sick can not eat , has no energy and has had upset stomach.      PCP appt next week     Stress and Echo 4/8/21         AT [facility]         Spoke to Darryl AYALA - increasing Isosorbide 30 mg up to 60 mg daily , we are going to try an get a Cath approved and Julia is going to work on getting PA for Repatha.     Patient verbalized understanding      AT [facility]

## 2021-03-18 ENCOUNTER — TELEPHONE (OUTPATIENT)
Dept: CARDIOLOGY | Facility: CLINIC | Age: 56
End: 2021-03-18

## 2021-03-18 NOTE — TELEPHONE ENCOUNTER
Message received from Cover My Meds for Repatha PA...A new prior authorization (PA) request cannot be started at this time because this member has other primary health insurance. Please contact the PA call center if you have questions on the status of this request.    Called Alexander, was advised patient has [employer] Commercial insurance as primary. Patient states she has no other insurance. She is to call Alexander with term date of other insurance before authorization will be completed. Patient verbalized understanding. Julia Peters MA

## 2021-03-31 ENCOUNTER — LAB (OUTPATIENT)
Dept: LAB | Facility: HOSPITAL | Age: 56
End: 2021-03-31

## 2021-03-31 DIAGNOSIS — I25.10 CORONARY ARTERY DISEASE INVOLVING NATIVE CORONARY ARTERY OF NATIVE HEART WITHOUT ANGINA PECTORIS: ICD-10-CM

## 2021-03-31 DIAGNOSIS — I10 ESSENTIAL HYPERTENSION: ICD-10-CM

## 2021-03-31 DIAGNOSIS — R06.02 SOB (SHORTNESS OF BREATH): ICD-10-CM

## 2021-03-31 DIAGNOSIS — R07.9 CHEST PAIN, UNSPECIFIED TYPE: ICD-10-CM

## 2021-03-31 DIAGNOSIS — E78.00 PURE HYPERCHOLESTEROLEMIA: Primary | ICD-10-CM

## 2021-03-31 LAB
ANION GAP SERPL CALCULATED.3IONS-SCNC: 15.7 MMOL/L (ref 5–15)
BASOPHILS # BLD AUTO: 0.05 10*3/MM3 (ref 0–0.2)
BASOPHILS NFR BLD AUTO: 0.6 % (ref 0–1.5)
BUN SERPL-MCNC: 17 MG/DL (ref 6–20)
BUN/CREAT SERPL: 20.5 (ref 7–25)
CALCIUM SPEC-SCNC: 9.8 MG/DL (ref 8.6–10.5)
CHLORIDE SERPL-SCNC: 100 MMOL/L (ref 98–107)
CO2 SERPL-SCNC: 25.3 MMOL/L (ref 22–29)
CREAT SERPL-MCNC: 0.83 MG/DL (ref 0.57–1)
DEPRECATED RDW RBC AUTO: 37.7 FL (ref 37–54)
EOSINOPHIL # BLD AUTO: 0.13 10*3/MM3 (ref 0–0.4)
EOSINOPHIL NFR BLD AUTO: 1.5 % (ref 0.3–6.2)
ERYTHROCYTE [DISTWIDTH] IN BLOOD BY AUTOMATED COUNT: 12.6 % (ref 12.3–15.4)
GFR SERPL CREATININE-BSD FRML MDRD: 71 ML/MIN/1.73
GLUCOSE SERPL-MCNC: 236 MG/DL (ref 65–99)
HCT VFR BLD AUTO: 39.1 % (ref 34–46.6)
HGB BLD-MCNC: 12.8 G/DL (ref 12–15.9)
IMM GRANULOCYTES # BLD AUTO: 0.03 10*3/MM3 (ref 0–0.05)
IMM GRANULOCYTES NFR BLD AUTO: 0.4 % (ref 0–0.5)
LYMPHOCYTES # BLD AUTO: 3.45 10*3/MM3 (ref 0.7–3.1)
LYMPHOCYTES NFR BLD AUTO: 40.6 % (ref 19.6–45.3)
MCH RBC QN AUTO: 27.3 PG (ref 26.6–33)
MCHC RBC AUTO-ENTMCNC: 32.7 G/DL (ref 31.5–35.7)
MCV RBC AUTO: 83.4 FL (ref 79–97)
MONOCYTES # BLD AUTO: 0.42 10*3/MM3 (ref 0.1–0.9)
MONOCYTES NFR BLD AUTO: 4.9 % (ref 5–12)
NEUTROPHILS NFR BLD AUTO: 4.42 10*3/MM3 (ref 1.7–7)
NEUTROPHILS NFR BLD AUTO: 52 % (ref 42.7–76)
NRBC BLD AUTO-RTO: 0 /100 WBC (ref 0–0.2)
PLATELET # BLD AUTO: 341 10*3/MM3 (ref 140–450)
PMV BLD AUTO: 9.6 FL (ref 6–12)
POTASSIUM SERPL-SCNC: 3.3 MMOL/L (ref 3.5–5.2)
RBC # BLD AUTO: 4.69 10*6/MM3 (ref 3.77–5.28)
SODIUM SERPL-SCNC: 141 MMOL/L (ref 136–145)
WBC # BLD AUTO: 8.5 10*3/MM3 (ref 3.4–10.8)

## 2021-03-31 PROCEDURE — 85025 COMPLETE CBC W/AUTO DIFF WBC: CPT

## 2021-03-31 PROCEDURE — 36415 COLL VENOUS BLD VENIPUNCTURE: CPT

## 2021-03-31 PROCEDURE — 80048 BASIC METABOLIC PNL TOTAL CA: CPT

## 2021-04-01 RX ORDER — POTASSIUM CHLORIDE 750 MG/1
10 TABLET, FILM COATED, EXTENDED RELEASE ORAL DAILY
Qty: 30 TABLET | Refills: 11 | Status: SHIPPED | OUTPATIENT
Start: 2021-04-01 | End: 2022-02-22

## 2021-04-15 ENCOUNTER — OUTSIDE FACILITY SERVICE (OUTPATIENT)
Dept: CARDIOLOGY | Facility: CLINIC | Age: 56
End: 2021-04-15

## 2021-04-15 DIAGNOSIS — I25.10 CORONARY ARTERY DISEASE INVOLVING NATIVE CORONARY ARTERY OF NATIVE HEART WITHOUT ANGINA PECTORIS: ICD-10-CM

## 2021-04-15 DIAGNOSIS — R06.02 SOB (SHORTNESS OF BREATH): ICD-10-CM

## 2021-04-15 DIAGNOSIS — R07.9 CHEST PAIN, UNSPECIFIED TYPE: ICD-10-CM

## 2021-04-15 DIAGNOSIS — I10 ESSENTIAL HYPERTENSION: ICD-10-CM

## 2021-04-15 PROCEDURE — 92978 ENDOLUMINL IVUS OCT C 1ST: CPT | Performed by: INTERNAL MEDICINE

## 2021-04-15 PROCEDURE — 93458 L HRT ARTERY/VENTRICLE ANGIO: CPT | Performed by: INTERNAL MEDICINE

## 2021-04-15 PROCEDURE — 92928 PRQ TCAT PLMT NTRAC ST 1 LES: CPT | Performed by: INTERNAL MEDICINE

## 2021-04-20 ENCOUNTER — OFFICE VISIT (OUTPATIENT)
Dept: CARDIOLOGY | Facility: CLINIC | Age: 56
End: 2021-04-20

## 2021-04-20 ENCOUNTER — LAB (OUTPATIENT)
Dept: CARDIOLOGY | Facility: CLINIC | Age: 56
End: 2021-04-20

## 2021-04-20 VITALS
DIASTOLIC BLOOD PRESSURE: 78 MMHG | OXYGEN SATURATION: 97 % | SYSTOLIC BLOOD PRESSURE: 133 MMHG | HEART RATE: 74 BPM | HEIGHT: 67 IN | WEIGHT: 211.8 LBS | BODY MASS INDEX: 33.24 KG/M2

## 2021-04-20 DIAGNOSIS — R06.02 SOB (SHORTNESS OF BREATH): ICD-10-CM

## 2021-04-20 DIAGNOSIS — I25.10 CORONARY ARTERY DISEASE INVOLVING NATIVE CORONARY ARTERY OF NATIVE HEART WITHOUT ANGINA PECTORIS: ICD-10-CM

## 2021-04-20 DIAGNOSIS — Z79.899 MEDICATION MANAGEMENT: ICD-10-CM

## 2021-04-20 DIAGNOSIS — E78.00 PURE HYPERCHOLESTEROLEMIA: ICD-10-CM

## 2021-04-20 DIAGNOSIS — E78.5 DYSLIPIDEMIA: Primary | ICD-10-CM

## 2021-04-20 DIAGNOSIS — Z98.890 STATUS POST LEFT HEART CATHETERIZATION (LHC): Primary | ICD-10-CM

## 2021-04-20 DIAGNOSIS — I10 ESSENTIAL HYPERTENSION: ICD-10-CM

## 2021-04-20 DIAGNOSIS — E78.5 DYSLIPIDEMIA: ICD-10-CM

## 2021-04-20 LAB
ALBUMIN SERPL-MCNC: 4.39 G/DL (ref 3.5–5.2)
ALBUMIN/GLOB SERPL: 1.6 G/DL
ALP SERPL-CCNC: 165 U/L (ref 39–117)
ALT SERPL W P-5'-P-CCNC: 27 U/L (ref 1–33)
ANION GAP SERPL CALCULATED.3IONS-SCNC: 11 MMOL/L (ref 5–15)
AST SERPL-CCNC: 17 U/L (ref 1–32)
BILIRUB SERPL-MCNC: 0.3 MG/DL (ref 0–1.2)
BUN SERPL-MCNC: 17 MG/DL (ref 6–20)
BUN/CREAT SERPL: 20.2 (ref 7–25)
CALCIUM SPEC-SCNC: 9.4 MG/DL (ref 8.6–10.5)
CHLORIDE SERPL-SCNC: 100 MMOL/L (ref 98–107)
CHOLEST SERPL-MCNC: 189 MG/DL (ref 0–200)
CO2 SERPL-SCNC: 25 MMOL/L (ref 22–29)
CREAT SERPL-MCNC: 0.84 MG/DL (ref 0.57–1)
GFR SERPL CREATININE-BSD FRML MDRD: 70 ML/MIN/1.73
GLOBULIN UR ELPH-MCNC: 2.7 GM/DL
GLUCOSE SERPL-MCNC: 187 MG/DL (ref 65–99)
HDLC SERPL-MCNC: 42 MG/DL (ref 40–60)
LDLC SERPL CALC-MCNC: 118 MG/DL (ref 0–100)
LDLC/HDLC SERPL: 2.73 {RATIO}
POTASSIUM SERPL-SCNC: 3.8 MMOL/L (ref 3.5–5.2)
PROT SERPL-MCNC: 7.1 G/DL (ref 6–8.5)
SODIUM SERPL-SCNC: 136 MMOL/L (ref 136–145)
TRIGL SERPL-MCNC: 162 MG/DL (ref 0–150)
VLDLC SERPL-MCNC: 29 MG/DL (ref 5–40)

## 2021-04-20 PROCEDURE — 99214 OFFICE O/P EST MOD 30 MIN: CPT | Performed by: NURSE PRACTITIONER

## 2021-04-20 PROCEDURE — 80061 LIPID PANEL: CPT | Performed by: NURSE PRACTITIONER

## 2021-04-20 PROCEDURE — 36415 COLL VENOUS BLD VENIPUNCTURE: CPT

## 2021-04-20 PROCEDURE — 80053 COMPREHEN METABOLIC PANEL: CPT | Performed by: NURSE PRACTITIONER

## 2021-04-20 RX ORDER — NITROGLYCERIN 0.4 MG/1
0.4 TABLET SUBLINGUAL
Qty: 25 TABLET | Refills: 1 | Status: SHIPPED | OUTPATIENT
Start: 2021-04-20 | End: 2022-08-24 | Stop reason: SDUPTHER

## 2021-04-20 NOTE — PATIENT INSTRUCTIONS

## 2021-04-21 ENCOUNTER — TELEPHONE (OUTPATIENT)
Dept: CARDIOLOGY | Facility: CLINIC | Age: 56
End: 2021-04-21

## 2021-04-21 DIAGNOSIS — E78.00 PURE HYPERCHOLESTEROLEMIA: Primary | ICD-10-CM

## 2021-04-21 NOTE — TELEPHONE ENCOUNTER
Total Cholesterol   0 - 200 mg/dL 189  224High   172    Triglycerides   0 - 150 mg/dL 162High   140  112    HDL Cholesterol   40 - 60 mg/dL 42  47  45    LDL Cholesterol    0 - 100 mg/dL 118High   149High   105High       First attempt to reach pt. Left a voicemail for pt to return my call at [phone].

## 2021-04-21 NOTE — TELEPHONE ENCOUNTER
----- Message from GAVIN Henry sent at 4/20/2021  3:53 PM EDT -----  Would you let the patient know that her cholesterol has improved but her LDL is still elevated, continue Repatha and recheck Lipid in 4-6 weeks. Liver function is normal.

## 2021-04-21 NOTE — TELEPHONE ENCOUNTER
"Adry Enciso MA Lanum, Emily  PT RETURNED CALL. WENT OVER LABS FROM LAST PHONE ENCOUNTER.  PT STATED THAT SHE IS NOT TAKING HER REPATHA. SHE STATED THAT SHE STILL HAS NOT GOTTEN IT BECAUSE IT STILL HAS NOT BEEN APPROVED. GMB, JODIEA       Per chart review, Telephone encounter from 3/18 states:  \"Message received from Cover My Meds for Repatha PA...A new prior authorization (PA) request cannot be started at this time because this member has other primary health insurance. Please contact the PA call center if you have questions on the status of this request.     Called Alexander, was advised patient has [employer] Commercial insurance as primary. Patient states she has no other insurance. She is to call Alexander with term date of other insurance before authorization will be completed. Patient verbalized understanding. Julia Peters MA\"      I called pt and asked if she called insurance, she stated she called and doesn't remember what happened, but thinks she may have spoken to them about Trulicity. Pt states she doesn't have Independent any more, I asked for term date, she stated 12/31/20. I informed her that I would check ritu/ Julia and see if anything else if needed for PA to be initiated.      "

## 2021-04-21 NOTE — TELEPHONE ENCOUNTER
PT RETURNED CALL. WENT OVER LABS FROM LAST PHONE ENCOUNTER.  PT STATED THAT SHE IS NOT TAKING HER REPATHA. SHE STATED THAT SHE STILL HAS NOT GOTTEN IT BECAUSE IT STILL HAS NOT BEEN APPROVED. GMB, JODIEA

## 2021-04-21 NOTE — TELEPHONE ENCOUNTER
Julia Peters, Kristina Gray  Caller: Unspecified (Today,  8:33 AM)  She needs to ensure the pharmacy has the correct prescription coverage on file and they will initiate PA. Thank you!       I called and informed pt of the above, she verbalized understanding.

## 2021-06-10 RX ORDER — OXCARBAZEPINE 600 MG/1
TABLET, FILM COATED ORAL
Qty: 60 TABLET | Refills: 5 | Status: SHIPPED | OUTPATIENT
Start: 2021-06-10 | End: 2021-07-30 | Stop reason: SDUPTHER

## 2021-06-21 PROBLEM — G40.019 PARTIAL IDIOPATHIC EPILEPSY WITH SEIZURES OF LOCALIZED ONSET, INTRACTABLE, WITHOUT STATUS EPILEPTICUS: Chronic | Status: ACTIVE | Noted: 2019-03-12

## 2021-06-21 PROBLEM — Z96.89 S/P PLACEMENT OF VNS (VAGUS NERVE STIMULATION) DEVICE: Chronic | Status: ACTIVE | Noted: 2017-12-11

## 2021-07-12 RX ORDER — ISOSORBIDE MONONITRATE 60 MG/1
TABLET, EXTENDED RELEASE ORAL
Qty: 30 TABLET | Refills: 2 | Status: SHIPPED | OUTPATIENT
Start: 2021-07-12 | End: 2021-11-10 | Stop reason: SDUPTHER

## 2021-07-21 ENCOUNTER — TELEPHONE (OUTPATIENT)
Dept: NEUROLOGY | Facility: OTHER | Age: 56
End: 2021-07-21

## 2021-07-21 NOTE — TELEPHONE ENCOUNTER
PATIENT CONTACTED AT 4:56 PM ON 7/21/21 AND RESCHEDULED WITH GLORIA LOCKETT FOR 8/18/21 WHEN DR. MONROE IS IN THE OFFICE.      Caller: JOHN    Relationship to patient: SELF    Best call back number: [phone]  Type of visit: FOLLOW UP WITH PA    PATIENT NEEDS TO BE SCHEDULED WITH MATHIEU ANDUJAR LINDSAY WHEN DR. MONROE IS IN THE OFFICE.        
Yes

## 2021-07-30 ENCOUNTER — OFFICE VISIT (OUTPATIENT)
Dept: NEUROLOGY | Facility: CLINIC | Age: 56
End: 2021-07-30

## 2021-07-30 VITALS
HEIGHT: 67 IN | WEIGHT: 208 LBS | SYSTOLIC BLOOD PRESSURE: 114 MMHG | BODY MASS INDEX: 32.65 KG/M2 | HEART RATE: 69 BPM | OXYGEN SATURATION: 96 % | DIASTOLIC BLOOD PRESSURE: 62 MMHG

## 2021-07-30 DIAGNOSIS — Z99.89 OSA ON CPAP: Chronic | ICD-10-CM

## 2021-07-30 DIAGNOSIS — Z96.89 S/P PLACEMENT OF VNS (VAGUS NERVE STIMULATION) DEVICE: Chronic | ICD-10-CM

## 2021-07-30 DIAGNOSIS — G40.019 PARTIAL IDIOPATHIC EPILEPSY WITH SEIZURES OF LOCALIZED ONSET, INTRACTABLE, WITHOUT STATUS EPILEPTICUS (HCC): Primary | Chronic | ICD-10-CM

## 2021-07-30 DIAGNOSIS — G47.33 OSA ON CPAP: Chronic | ICD-10-CM

## 2021-07-30 PROCEDURE — 95976 ALYS SMPL CN NPGT PRGRMG: CPT | Performed by: PSYCHIATRY & NEUROLOGY

## 2021-07-30 PROCEDURE — 99214 OFFICE O/P EST MOD 30 MIN: CPT | Performed by: PSYCHIATRY & NEUROLOGY

## 2021-07-30 RX ORDER — INSULIN HUMAN 500 [IU]/ML
15 INJECTION, SOLUTION SUBCUTANEOUS
COMMUNITY
Start: 2021-07-02

## 2021-07-30 RX ORDER — ROSUVASTATIN CALCIUM 20 MG/1
TABLET, COATED ORAL
COMMUNITY
Start: 2021-05-09 | End: 2022-02-22

## 2021-07-30 RX ORDER — OXCARBAZEPINE 600 MG/1
600 TABLET, FILM COATED ORAL 2 TIMES DAILY
Qty: 180 TABLET | Refills: 3 | Status: SHIPPED | OUTPATIENT
Start: 2021-07-30 | End: 2022-02-22

## 2021-07-30 NOTE — PROGRESS NOTES
"Chief Complaint  Seizures    Subjective          Phyllis Iyer presents to [facility] NEUROLOGY     History of Present Illness    56 y.o. female returns in follow up.  Last visit on 5/20/20 continued OXC and programmed VNS.       Three sz since last visit.  Occur at night.  Right face pulling.      Wishes to restarted CPAP, loud snoring, witnessed apnea and excessive daytime sleepiness.     VNS interrogated and recorded on flowsheet       Aura of right eye visual blurring and pulling.  Right face and tongue goes numb.      Objective   Vital Signs:   /62   Pulse 69   Ht 170.2 cm (67.01\")   Wt 94.3 kg (208 lb)   SpO2 96%   BMI 32.57 kg/m²     Physical Exam  Eyes:      Extraocular Movements: EOM normal.      Pupils: Pupils are equal, round, and reactive to light.   Neurological:      Mental Status: She is oriented to person, place, and time.      Gait: Gait is intact.      Deep Tendon Reflexes: Strength normal.   Psychiatric:         Speech: Speech normal.          Neurologic Exam     Mental Status   Oriented to person, place, and time.   Speech: speech is normal   Level of consciousness: alert  Knowledge: good and consistent with education.   Normal comprehension.     Cranial Nerves   Cranial nerves II through XII intact.     CN II   Visual fields full to confrontation.   Visual acuity: normal  Right visual field deficit: none  Left visual field deficit: none     CN III, IV, VI   Pupils are equal, round, and reactive to light.  Extraocular motions are normal.   Nystagmus: none   Diplopia: none  Ophthalmoparesis: none  Upgaze: normal  Downgaze: normal  Conjugate gaze: present    CN V   Facial sensation intact.   Right corneal reflex: normal  Left corneal reflex: normal    CN VII   Right facial weakness: none  Left facial weakness: none    CN VIII   Hearing: intact    CN IX, X   Palate: symmetric  Right gag reflex: normal  Left gag reflex: normal    CN XI   Right sternocleidomastoid " strength: normal  Left sternocleidomastoid strength: normal    CN XII   Tongue: not atrophic  Fasciculations: absent  Tongue deviation: none    Motor Exam   Muscle bulk: normal  Overall muscle tone: normal    Strength   Strength 5/5 throughout.     Sensory Exam   Light touch normal.     Gait, Coordination, and Reflexes     Gait  Gait: normal    Tremor   Resting tremor: absent  Intention tremor: absent  Action tremor: absent    Reflexes   Reflexes 2+ except as noted.      Result Review :   The following data was reviewed by: Daquan Nguyen MD on 07/30/2021:  Common labs    Common Labsle 3/31/21 3/31/21 4/20/21 4/20/21    1214 1214 1009 1009   Glucose  236 (A)  187 (A)   BUN  17  17   Creatinine  0.83  0.84   eGFR Non African Am  71  70   Sodium  141  136   Potassium  3.3 (A)  3.8   Chloride  100  100   Calcium  9.8  9.4   Albumin    4.39   Total Bilirubin    0.3   Alkaline Phosphatase    165 (A)   AST (SGOT)    17   ALT (SGPT)    27   WBC 8.50      Hemoglobin 12.8      Hematocrit 39.1      Platelets 341      Total Cholesterol   189    Triglycerides   162 (A)    HDL Cholesterol   42    LDL Cholesterol    118 (A)    (A) Abnormal value                      Assessment and Plan    Diagnoses and all orders for this visit:    1. Partial idiopathic epilepsy with seizures of localized onset, intractable, without status epilepticus (CMS/HCC) (Primary)  Assessment & Plan:  Awakens with right face pulling three times in last year    Tolerating OXC       2. S/P placement of VNS (vagus nerve stimulation) device  Assessment & Plan:  Setting per flowsheet      3. JOSE on CPAP  Assessment & Plan:  Off CPAP and wishes to restart    Requesting rx for new machine      Other orders  -     OXcarbazepine (TRILEPTAL) 600 MG tablet; Take 1 tablet by mouth 2 (Two) Times a Day.  Dispense: 180 tablet; Refill: 3      Follow Up   No follow-ups on file.  Patient was given instructions and counseling regarding her condition or for health  maintenance advice. Please see specific information pulled into the AVS if appropriate.

## 2021-08-23 ENCOUNTER — OFFICE VISIT (OUTPATIENT)
Dept: NEUROSURGERY | Facility: CLINIC | Age: 56
End: 2021-08-23

## 2021-08-23 VITALS
HEIGHT: 67 IN | WEIGHT: 207.4 LBS | SYSTOLIC BLOOD PRESSURE: 148 MMHG | DIASTOLIC BLOOD PRESSURE: 80 MMHG | BODY MASS INDEX: 32.55 KG/M2 | TEMPERATURE: 97.3 F

## 2021-08-23 DIAGNOSIS — M54.16 LUMBAR RADICULOPATHY: Primary | ICD-10-CM

## 2021-08-23 PROCEDURE — 99213 OFFICE O/P EST LOW 20 MIN: CPT | Performed by: PHYSICIAN ASSISTANT

## 2021-08-23 NOTE — PROGRESS NOTES
Patient: Phyllis Iyer  : 1965  Gender: female    Primary Care Provider: Jack Early MD    Requesting Provider: As above    Chief Complaint: Low back pain.    History of Present Illness:  Ms. iyer is a 56-year-old woman with a PMH significant for DMII, CAD (s/p 11 cardiac stents, has had MI since last visit with us) on aspirin and Plavix.  She is well-known to Dr. Palm service for undergoing VNS placement thousand nine for generalized seizure disorder.  Thousand 14 she underwent PLIF L4-5 to correct spondylolisthesis and high-grade stenosis.  She was last seen in our clinic around 3 years ago for increased back pain.  Myelogram at that time demonstrated mild narrowing adjacent at L3-4 without evidence of instability.  She has followed with her pain management doctor since.      Patient presents today for evaluation of worsening low back pain.  She feels that symptoms have gradually increased since her last visit with us.  Pain is mostly in her low back with some vague radiation to her lower extremities.  Occasionally she will have some numbness in stocking distribution in her left leg.  She has significant difficulty standing and walking.  She takes gabapentin 60 four times daily as well as Norco 7.5 4 times daily.  She has not been to therapy as of late.      Past Medical and Surgical History:  Past Medical History:   Diagnosis Date   • Arthritis    • CAD (coronary artery disease)    • Chest tightness or pressure    • Complex partial seizure (CMS/HCC)    • Diabetes mellitus (CMS/HCC)    • Dyslipidemia    • FHx: migraine headaches    • GERD (gastroesophageal reflux disease)    • Hiatal hernia    • Hypercholesterolemia    • Hypertension    • Myocardial infarction (CMS/HCC)    • JOSE on CPAP    • Stroke syndrome    • Stroke syndrome      Past Surgical History:   Procedure Laterality Date   • CARDIAC CATHETERIZATION     • CORONARY ANGIOPLASTY WITH STENT PLACEMENT     • CORONARY STENT PLACEMENT       x5   • LUMBAR FUSION  07/21/2014    decompression and fusion L4/5 Dr. Palm       Current Medications:    Current Outpatient Medications:   •  aspirin 81 MG tablet, Take 1 tablet by mouth Daily., Disp: 30 tablet, Rfl: 11  •  clopidogrel (PLAVIX) 75 MG tablet, Take 1 tablet by mouth Daily., Disp: 90 tablet, Rfl: 3  •  DULoxetine (CYMBALTA) 30 MG capsule, Take 30 mg by mouth Daily., Disp: , Rfl:   •  famotidine (PEPCID) 40 MG tablet, Take 40 mg by mouth Daily., Disp: , Rfl:   •  FLECTOR 1.3 % patch patch, , Disp: , Rfl:   •  gabapentin (NEURONTIN) 600 MG tablet, 4 (Four) Times a Day., Disp: , Rfl:   •  HumuLIN R U-500 KwikPen 500 UNIT/ML solution pen-injector CONCENTRATED injection, , Disp: , Rfl:   •  hydroCHLOROthiazide (HYDRODIURIL) 25 MG tablet, Take 25 mg by mouth Daily., Disp: , Rfl:   •  HYDROcodone-acetaminophen (NORCO) 7.5-325 MG per tablet, Take 1 tablet by mouth Every 6 (Six) Hours As Needed for Moderate Pain (4-6)., Disp: , Rfl:   •  ibuprofen (ADVIL,MOTRIN) 800 MG tablet, TK 1 T PO TID, Disp: , Rfl:   •  isosorbide mononitrate (IMDUR) 60 MG 24 hr tablet, TAKE ONE TABLET BY MOUTH DAILY, Disp: 30 tablet, Rfl: 2  •  metoprolol tartrate (LOPRESSOR) 25 MG tablet, TAKE ONE TABLET BY MOUTH TWICE A DAY, Disp: 60 tablet, Rfl: 2  •  nitroglycerin (Nitrostat) 0.4 MG SL tablet, Place 1 tablet under the tongue Every 5 (Five) Minutes As Needed for Chest Pain. prn, Disp: 25 tablet, Rfl: 1  •  omeprazole (priLOSEC) 20 MG capsule, Take 20 mg by mouth 2 (two) times a day., Disp: , Rfl:   •  OXcarbazepine (TRILEPTAL) 600 MG tablet, Take 1 tablet by mouth 2 (Two) Times a Day., Disp: 180 tablet, Rfl: 3  •  pantoprazole (PROTONIX) 40 MG EC tablet, 2 (Two) Times a Day., Disp: , Rfl:   •  potassium chloride 10 MEQ CR tablet, Take 1 tablet by mouth Daily., Disp: 30 tablet, Rfl: 11  •  promethazine (PHENERGAN) 25 MG tablet, Take 25 mg by mouth Every 6 (Six) Hours As Needed for Nausea or Vomiting., Disp: , Rfl:   •   rosuvastatin (CRESTOR) 20 MG tablet, , Disp: , Rfl:   •  traZODone (DESYREL) 150 MG tablet, TAKE ONE TABLET BY MOUTH EVERY NIGHT, Disp: 30 tablet, Rfl: 4  •  TRESIBA FLEXTOUCH 100 UNIT/ML solution pen-injector injection, 34 Units 2 (two) times a day., Disp: , Rfl:     Allergies:  Allergies   Allergen Reactions   • Sulfa Antibiotics GI Intolerance         Review of Systems   Constitutional: Negative.  Negative for activity change, appetite change, chills, diaphoresis, fatigue, fever, unexpected weight gain and unexpected weight loss.   HENT: Positive for sinus pressure and tinnitus. Negative for congestion, dental problem, drooling, ear discharge, ear pain, facial swelling, hearing loss, mouth sores, nosebleeds, postnasal drip, rhinorrhea, sneezing, sore throat, trouble swallowing and voice change.    Eyes: Negative.  Negative for blurred vision, double vision, photophobia, pain, discharge, redness, itching and visual disturbance.   Respiratory: Positive for apnea. Negative for cough, choking, chest tightness, shortness of breath, wheezing and stridor.    Cardiovascular: Negative.  Negative for chest pain, palpitations and leg swelling.   Gastrointestinal: Negative.  Negative for abdominal distention, abdominal pain, anal bleeding, blood in stool, constipation, diarrhea, nausea, rectal pain, vomiting, GERD and indigestion.   Endocrine: Negative.  Negative for cold intolerance, heat intolerance, polydipsia, polyphagia and polyuria.   Genitourinary: Negative.  Negative for breast discharge, breast lump, breast pain, decreased libido, decreased urine volume, difficulty urinating, dyspareunia, dysuria, flank pain, frequency, genital sores, hematuria, menstrual problem, pelvic pain, urgency, urinary incontinence, vaginal bleeding, vaginal discharge and vaginal pain.   Musculoskeletal: Positive for back pain, neck pain and neck stiffness. Negative for arthralgias, gait problem, joint swelling, myalgias and bursitis.  "  Skin: Negative.  Negative for color change, dry skin, pallor, rash, skin lesions and bruise.   Allergic/Immunologic: Negative.  Negative for environmental allergies, food allergies and immunocompromised state.   Neurological: Negative.  Negative for dizziness, tremors, seizures, syncope, facial asymmetry, speech difficulty, weakness, light-headedness, numbness, headache, memory problem and confusion.   Hematological: Negative.  Negative for adenopathy. Does not bruise/bleed easily.   Psychiatric/Behavioral: Negative.  Negative for agitation, behavioral problems, decreased concentration, dysphoric mood, hallucinations, self-injury, sleep disturbance, suicidal ideas, negative for hyperactivity, depressed mood and stress. The patient is not nervous/anxious.    All other systems reviewed and are negative.        Physical Exam  Constitutional:       Appearance: Normal appearance.   Musculoskeletal:         General: Normal range of motion.      Cervical back: Normal range of motion and neck supple.   Skin:     General: Skin is warm and dry.   Neurological:      Mental Status: She is alert and oriented to person, place, and time.      Sensory: Sensation is intact.      Motor: Motor function is intact.      Coordination: Coordination is intact.      Gait: Gait is intact.      Deep Tendon Reflexes:      Reflex Scores:       Bicep reflexes are 1+ on the right side and 1+ on the left side.       Brachioradialis reflexes are 1+ on the right side and 1+ on the left side.       Patellar reflexes are 1+ on the right side and 1+ on the left side.       Achilles reflexes are 1+ on the right side and 1+ on the left side.  Psychiatric:         Mood and Affect: Mood normal.         Behavior: Behavior normal.           Vitals:    08/23/21 1043   BP: 148/80   BP Location: Right arm   Patient Position: Sitting   Cuff Size: Adult   Temp: 97.3 °F (36.3 °C)   TempSrc: Infrared   Weight: 94.1 kg (207 lb 6.4 oz)   Height: 170.2 cm (67\") "       Patient's Body mass index is 32.48 kg/m². indicating that she is obese (BMI >30).     Independent Review of Diagnostic Imaging:  No new imaging    Assessment:  1.  Chronic low back pain  2.  History of lumbar fusion    Plan:  Ms. Iyer is seen today for evaluation of worsening low back pain with standing and walking difficulties.  She follows with her pain management doctor with regular injections which have not helped as of late.  She continues taking medications.  She has not been to therapy.  I have given her an order for this.  She will return to clinic in several weeks to discuss her progress.  His symptoms continue at that time we will consider proceeding with a lumbar myelogram as she cannot undergo MRI imaging given her VNS.  She will call with any new or worsening symptoms in the interim.        Dina Soto PA-C

## 2021-08-26 ENCOUNTER — TELEPHONE (OUTPATIENT)
Dept: NEUROLOGY | Facility: CLINIC | Age: 56
End: 2021-08-26

## 2021-08-26 DIAGNOSIS — Z99.89 OSA ON CPAP: Primary | ICD-10-CM

## 2021-08-26 DIAGNOSIS — G47.33 OSA ON CPAP: Primary | ICD-10-CM

## 2021-08-26 NOTE — TELEPHONE ENCOUNTER
[facility] is requesting a copy of her sleep study/recent office note. I have faxed over her OV note.    Spoke with Phyllis and where it has been so long she spoke with them and they are wanting her to do a new one and that she can just do an at home one but they need an order from .     FYTOBI:[phone]

## 2021-09-23 DIAGNOSIS — I10 ESSENTIAL HYPERTENSION: Primary | ICD-10-CM

## 2021-09-23 DIAGNOSIS — R00.2 PALPITATIONS: ICD-10-CM

## 2021-09-28 DIAGNOSIS — I10 ESSENTIAL HYPERTENSION: ICD-10-CM

## 2021-09-28 DIAGNOSIS — R00.2 PALPITATIONS: ICD-10-CM

## 2021-09-30 ENCOUNTER — TELEPHONE (OUTPATIENT)
Dept: NEUROLOGY | Facility: CLINIC | Age: 56
End: 2021-09-30

## 2021-09-30 DIAGNOSIS — G47.33 OSA ON CPAP: Primary | ICD-10-CM

## 2021-09-30 NOTE — TELEPHONE ENCOUNTER
Caller: Phyllis Iyer    Relationship: Self    Best call back number: [phone]    What is the medical concern/diagnosis: JOSE    What specialty or service is being requested: SLEEP STUDY (UPDATED)    What is the provider, practice or medical service name: SLEEP MEDICINE    What is the office location: [facility]     What is the office phone number: N/A    Any additional details: PATIENT SAID SHE WAS ADVISED SHE NEEDS AN UPDATED SLEEP STUDY IN ORDER TO GET A NEW MACHINE.

## 2021-10-05 ENCOUNTER — OFFICE VISIT (OUTPATIENT)
Dept: NEUROSURGERY | Facility: CLINIC | Age: 56
End: 2021-10-05

## 2021-10-05 VITALS
TEMPERATURE: 97.1 F | SYSTOLIC BLOOD PRESSURE: 120 MMHG | BODY MASS INDEX: 32.65 KG/M2 | DIASTOLIC BLOOD PRESSURE: 78 MMHG | WEIGHT: 208 LBS | HEIGHT: 67 IN

## 2021-10-05 DIAGNOSIS — M54.42 CHRONIC BILATERAL LOW BACK PAIN WITH LEFT-SIDED SCIATICA: Primary | ICD-10-CM

## 2021-10-05 DIAGNOSIS — G89.29 CHRONIC BILATERAL LOW BACK PAIN WITH LEFT-SIDED SCIATICA: Primary | ICD-10-CM

## 2021-10-05 DIAGNOSIS — Z98.1 HISTORY OF LUMBAR FUSION: ICD-10-CM

## 2021-10-05 PROCEDURE — 99213 OFFICE O/P EST LOW 20 MIN: CPT | Performed by: PHYSICIAN ASSISTANT

## 2021-10-05 NOTE — PROGRESS NOTES
Subjective     Chief Complaint: Low back pain    Patient ID: Phyllis Iyer is a 56 y.o. female is here today for follow-up.    History of Present Illness    This is a 56-year-old woman with medical history significant for peripheral neuropathy, DM 2, CAD on aspirin and Plavix.  She is known to Dr. Palm's service for undergoing VNS placement in 2009 for seizure disorder and L4-5 PLIF in 2014.  She was seen in our office 6 weeks ago with complaints of low back pain.  She was referred for physical therapy and reports today in follow-up.  She has not started with physical therapy as she contracted Covid in the interim of visits.  She reports ongoing low back pain.  She occasionally has some pain radiating into her left anterior thigh and wrapping around into her knee.  On occasion it feels like her knee will give out on her.  Her back pain is most bothersome.  She follows with pain management and has undergone multiple injections without relief.  She takes gabapentin and Norco daily.  Denies focal weakness, saddle anesthesia or bowel bladder dysfunction.    The following portions of the patient's history were reviewed and updated as appropriate: allergies, current medications, past family history, past medical history, past social history, past surgical history and problem list.    Family history:   Family History   Problem Relation Age of Onset   • Heart disease Other    • Diabetes Other    • Cancer Other    • Stroke Other    • Coronary artery disease Mother    • Heart attack Mother    • Coronary artery disease Father        Social history:   Social History     Socioeconomic History   • Marital status:      Spouse name: Not on file   • Number of children: Not on file   • Years of education: Not on file   • Highest education level: Not on file   Tobacco Use   • Smoking status: Former Smoker   • Smokeless tobacco: Never Used   Vaping Use   • Vaping Use: Never used   Substance and Sexual Activity   • Alcohol  "use: No       Review of Systems   Musculoskeletal: Positive for back pain.   All other systems reviewed and are negative.      Objective   Blood pressure 120/78, temperature 97.1 °F (36.2 °C), height 170.2 cm (67\"), weight 94.3 kg (208 lb), not currently breastfeeding.  Body mass index is 32.58 kg/m².  Patient's Body mass index is 32.58 kg/m². indicating that she is obese (BMI >30). Obesity-related health conditions include the following: hypertension and osteoarthritis. Obesity is unchanged. BMI is is above average; BMI management plan is completed. We discussed portion control and increasing exercise..      Physical Exam  Constitutional:       Appearance: Normal appearance.   HENT:      Head: Normocephalic and atraumatic.   Eyes:      Conjunctiva/sclera: Conjunctivae normal.   Pulmonary:      Effort: Pulmonary effort is normal.   Musculoskeletal:      Cervical back: Neck supple.   Skin:     General: Skin is warm and dry.   Neurological:      Mental Status: She is alert.      GCS: GCS eye subscore is 4. GCS verbal subscore is 5. GCS motor subscore is 6.      Cranial Nerves: Cranial nerves are intact.      Sensory: Sensory deficit present.      Motor: Motor function is intact.      Gait: Gait is intact.      Deep Tendon Reflexes: Reflexes are normal and symmetric.      Comments: Casual gait is normal.  She has altered sensation in a stocking distribution.  Strength is intact to direct testing and 5/5 both proximally and distally in lower extremities.  Reflexes are 1+ and symmetric.  Clonus is absent.   Psychiatric:         Mood and Affect: Mood normal.         Behavior: Behavior normal.           Assessment/Plan     This is a 56-year-old woman with a history of an L4-5 PLIF in 2014.  She is seen today with ongoing low back pain and occasional radiation into her left leg in an L3 vs L4 distribution.  She was unable to start with physical therapy and is scheduled to start at the end of this week.  The majority of her " complaints are low back pain.  I think physical therapy is an excellent option.  If her symptoms persist or she begins to develop worsening radicular complaints, we can discuss proceeding with a lumbar myelogram (she has a VNS and is unable to undergo MRI).  We will follow-up with her in 8 weeks after PT, or sooner if clinically indicated.    Diagnoses and all orders for this visit:    1. Chronic bilateral low back pain with left-sided sciatica (Primary)    2. History of lumbar fusion        Return in about 8 weeks (around 11/30/2021).             Stormy Schaefer PA-C

## 2021-11-10 RX ORDER — ISOSORBIDE MONONITRATE 60 MG/1
60 TABLET, EXTENDED RELEASE ORAL DAILY
Qty: 30 TABLET | Refills: 0 | Status: SHIPPED | OUTPATIENT
Start: 2021-11-10 | End: 2022-08-02 | Stop reason: ALTCHOICE

## 2022-02-22 ENCOUNTER — OFFICE VISIT (OUTPATIENT)
Dept: CARDIOLOGY | Facility: CLINIC | Age: 57
End: 2022-02-22

## 2022-02-22 VITALS
DIASTOLIC BLOOD PRESSURE: 80 MMHG | WEIGHT: 209 LBS | BODY MASS INDEX: 32.8 KG/M2 | HEART RATE: 70 BPM | HEIGHT: 67 IN | OXYGEN SATURATION: 98 % | SYSTOLIC BLOOD PRESSURE: 125 MMHG

## 2022-02-22 DIAGNOSIS — I25.10 CORONARY ARTERY DISEASE INVOLVING NATIVE CORONARY ARTERY OF NATIVE HEART WITHOUT ANGINA PECTORIS: Primary | ICD-10-CM

## 2022-02-22 DIAGNOSIS — E78.5 DYSLIPIDEMIA: ICD-10-CM

## 2022-02-22 DIAGNOSIS — I10 ESSENTIAL HYPERTENSION: ICD-10-CM

## 2022-02-22 PROCEDURE — 93000 ELECTROCARDIOGRAM COMPLETE: CPT | Performed by: PHYSICIAN ASSISTANT

## 2022-02-22 PROCEDURE — 99213 OFFICE O/P EST LOW 20 MIN: CPT | Performed by: PHYSICIAN ASSISTANT

## 2022-02-22 RX ORDER — ROSUVASTATIN CALCIUM 20 MG/1
20 TABLET, COATED ORAL NIGHTLY
Qty: 30 TABLET | Refills: 11 | Status: SHIPPED | OUTPATIENT
Start: 2022-02-22 | End: 2022-09-13

## 2022-02-22 RX ORDER — IRBESARTAN AND HYDROCHLOROTHIAZIDE 150; 12.5 MG/1; MG/1
1 TABLET, FILM COATED ORAL DAILY
COMMUNITY
End: 2022-04-12

## 2022-02-22 NOTE — PROGRESS NOTES
Problem list     Subjective   Phyllis Iyer is a 56 y.o. female     Chief Complaint   Patient presents with   • Coronary Artery Disease   Problem list:     1. CAD  1.1 cardiac catheterization in 2011 with stenting to the mid LAD as well as proximal, mid, and distal RCA with medical management recommended  1.2 left heart catheterization January 2016 with stenting of the right coronary artery with nonobstructive disease otherwise  1.3 stress test November 2016 but no evidence ischemia and preserved LV function  1.4 cardiac catheterization 5/4/2018 by Dr. Malik because of non-ST elevation myocardial infarction demonstrating high-grade RCA disease status post stenting ×2.  60% IntraStent restenosis of the LAD with medical management recommended.  Normal LV function noted  1.5 cardiac catheterization July 2018 with stenting of the LAD because of refractory angina.  30 to 50% of the circumflex and RCA with medical management recommended  1.6 cardiac catheterization June 2020 because of unstable angina with stenting x1 of the RCA for subtotal stenosis.  Residual disease of the LAD 50% with an 80 to 90% diagonal.  Nonobstructive disease otherwise with medical management recommended  2.  Preserved systolic function  3.  Hypertension  4.  Dyslipidemia  5.  Diabetes mellitus  6.  Obstructive sleep apnea noncompliant with CPAP therapy    HPI    Patient is a 56-year-old female who presents back to the office for routine follow-up.  She has done relatively well since her last visit here.  She does not describe any chest pain or pressure.  She does have a degree of mild shortness of breath.  She does not describe progressive dyspnea.  No complaints of PND orthopnea.    Patient will occasionally palpitate but does not describe any strokelike symptoms.  No complaints of dizziness, presyncope or syncope    Patient feels remarkably well at this point.  Current Outpatient Medications on File Prior to Visit   Medication Sig Dispense  Refill   • aspirin 81 MG tablet Take 1 tablet by mouth Daily. 30 tablet 11   • clopidogrel (PLAVIX) 75 MG tablet Take 1 tablet by mouth Daily. 90 tablet 3   • DULoxetine (CYMBALTA) 30 MG capsule Take 30 mg by mouth Daily.     • famotidine (PEPCID) 40 MG tablet Take 40 mg by mouth Daily.     • gabapentin (NEURONTIN) 600 MG tablet 4 (Four) Times a Day.     • HumuLIN R U-500 KwikPen 500 UNIT/ML solution pen-injector CONCENTRATED injection      • hydroCHLOROthiazide (HYDRODIURIL) 25 MG tablet Take 25 mg by mouth Daily.     • HYDROcodone-acetaminophen (NORCO) 7.5-325 MG per tablet Take 1 tablet by mouth Every 6 (Six) Hours As Needed for Moderate Pain (4-6).     • irbesartan-hydrochlorothiazide (AVALIDE) 150-12.5 MG tablet Take 1 tablet by mouth Daily.     • isosorbide mononitrate (IMDUR) 60 MG 24 hr tablet Take 1 tablet by mouth Daily. 30 tablet 0   • metoprolol tartrate (LOPRESSOR) 25 MG tablet TAKE ONE TABLET BY MOUTH TWICE A DAY 60 tablet 5   • nitroglycerin (Nitrostat) 0.4 MG SL tablet Place 1 tablet under the tongue Every 5 (Five) Minutes As Needed for Chest Pain. prn 25 tablet 1   • pantoprazole (PROTONIX) 40 MG EC tablet 2 (Two) Times a Day.     • promethazine (PHENERGAN) 25 MG tablet Take 25 mg by mouth Every 6 (Six) Hours As Needed for Nausea or Vomiting.     • traZODone (DESYREL) 150 MG tablet TAKE ONE TABLET BY MOUTH EVERY NIGHT 30 tablet 4   • TRESIBA FLEXTOUCH 100 UNIT/ML solution pen-injector injection 50 Units 2 (two) times a day.       No current facility-administered medications on file prior to visit.       Sulfa antibiotics    Past Medical History:   Diagnosis Date   • Arthritis    • CAD (coronary artery disease)    • Chest tightness or pressure    • Complex partial seizure (HCC)    • Diabetes mellitus (HCC)    • Dyslipidemia    • FHx: migraine headaches    • GERD (gastroesophageal reflux disease)    • Hiatal hernia    • Hypercholesterolemia    • Hypertension    • Myocardial infarction (HCC)    • JOSE  "on CPAP    • Stroke syndrome    • Stroke syndrome        Social History     Socioeconomic History   • Marital status:    Tobacco Use   • Smoking status: Former Smoker   • Smokeless tobacco: Never Used   Vaping Use   • Vaping Use: Never used   Substance and Sexual Activity   • Alcohol use: No       Family History   Problem Relation Age of Onset   • Heart disease Other    • Diabetes Other    • Cancer Other    • Stroke Other    • Coronary artery disease Mother    • Heart attack Mother    • Coronary artery disease Father        Review of Systems   Constitutional: Negative.  Negative for chills and fever.   HENT: Negative.  Negative for congestion and sinus pressure.    Respiratory: Positive for shortness of breath. Negative for chest tightness.    Cardiovascular: Positive for palpitations (races at times). Negative for chest pain and leg swelling.   Gastrointestinal: Negative.  Negative for abdominal pain, blood in stool, constipation, diarrhea, nausea and vomiting.   Genitourinary: Positive for frequency. Negative for dysuria, hematuria and urgency.   Allergic/Immunologic: Negative.  Negative for environmental allergies and food allergies.   Neurological: Negative.  Negative for dizziness, syncope and light-headedness.   Psychiatric/Behavioral: Negative for sleep disturbance (denies waking with soa or cp).       Objective   Vitals:    02/22/22 1515   BP: 125/80   BP Location: Left arm   Patient Position: Sitting   Pulse: 70   SpO2: 98%   Weight: 94.8 kg (209 lb)   Height: 170.2 cm (67\")      /80 (BP Location: Left arm, Patient Position: Sitting)   Pulse 70   Ht 170.2 cm (67\")   Wt 94.8 kg (209 lb)   SpO2 98%   BMI 32.73 kg/m²     Lab Results (most recent)     None          Physical Exam  Vitals and nursing note reviewed.   Constitutional:       General: She is not in acute distress.     Appearance: Normal appearance. She is well-developed.   HENT:      Head: Normocephalic and atraumatic.   Eyes:      " General: No scleral icterus.        Right eye: No discharge.         Left eye: No discharge.      Conjunctiva/sclera: Conjunctivae normal.   Neck:      Vascular: No carotid bruit.   Cardiovascular:      Rate and Rhythm: Normal rate and regular rhythm.      Heart sounds: Normal heart sounds. No murmur heard.  No friction rub. No gallop.    Pulmonary:      Effort: Pulmonary effort is normal. No respiratory distress.      Breath sounds: Normal breath sounds. No wheezing or rales.   Chest:      Chest wall: No tenderness.   Musculoskeletal:      Right lower leg: No edema.      Left lower leg: No edema.   Skin:     General: Skin is warm and dry.      Coloration: Skin is not pale.      Findings: No erythema or rash.   Neurological:      Mental Status: She is alert and oriented to person, place, and time.      Cranial Nerves: No cranial nerve deficit.   Psychiatric:         Behavior: Behavior normal.         Procedure     ECG 12 Lead    Date/Time: 2/22/2022 3:22 PM  Performed by: Darryl Ocasio PA  Authorized by: Darryl Ocasio PA   Comparison: compared with previous ECG from 6/4/2020  Comments: EKG demonstrates sinus rhythm at 66 bpm otherwise normal               Assessment/Plan     Problems Addressed this Visit        Cardiac and Vasculature    Coronary artery disease involving native coronary artery of native heart without angina pectoris - Primary    Essential hypertension    Relevant Medications    irbesartan-hydrochlorothiazide (AVALIDE) 150-12.5 MG tablet    Dyslipidemia      Diagnoses       Codes Comments    Coronary artery disease involving native coronary artery of native heart without angina pectoris    -  Primary ICD-10-CM: I25.10  ICD-9-CM: 414.01     Dyslipidemia     ICD-10-CM: E78.5  ICD-9-CM: 272.4     Essential hypertension     ICD-10-CM: I10  ICD-9-CM: 401.9           Recommendation  1.  Patient is a 56-year-old female that presents to the office for evaluation.  Patient has history of coronary  disease but is doing well on current medical regimen.  She has no symptoms of angina or failure.  For now we will monitor    2.  Patient doing well with baseline hypertension.  We will continue her antihypertensive regimen    3.  Patient with dyslipidemia.  Patient has a total of 11 stents.  We cannot get Repatha approved.  Patient has been on Crestor and still has significant dyslipidemia.  We are going to see if it is possible that she could be approved for LEQVIO to help with lipid management and reduce recurrent revascularization episodes    4.  For now we will see her back for follow-up in 6 months or sooner as symptoms discussed.  Follow-up with primary as scheduled         Patient brought in medicine list to appointment, it's been reviewed with patient and med list was updated in the chart.          Electronically signed by:

## 2022-02-22 NOTE — PATIENT INSTRUCTIONS

## 2022-03-04 ENCOUNTER — TELEPHONE (OUTPATIENT)
Dept: CARDIOLOGY | Facility: CLINIC | Age: 57
End: 2022-03-04

## 2022-03-04 RX ORDER — INCLISIRAN 284 MG/1.5ML
INJECTION, SOLUTION SUBCUTANEOUS
COMMUNITY
End: 2022-03-18

## 2022-03-15 ENCOUNTER — TELEPHONE (OUTPATIENT)
Dept: CARDIOLOGY | Facility: CLINIC | Age: 57
End: 2022-03-15

## 2022-03-15 NOTE — TELEPHONE ENCOUNTER
Patient informed we are working on Leqvio details, to see where injection where be administered, and which facility. Injection has been approved. Patient verbalized understanding. Lana Gallego LPN

## 2022-03-18 NOTE — ASSESSMENT & PLAN NOTE
Start  mg BID due to cost    VNS checked per flowsheet.    Patient/Caregiver provided printed discharge information.

## 2022-03-18 NOTE — TELEPHONE ENCOUNTER
Patient informed unable to do Leqvio injections at this time. Per Darryl Cartagena sent to pharmacy, will attempt PA for coverage if needed. Patient verbalized understanding. Lana Gallego LPN

## 2022-03-21 ENCOUNTER — TELEPHONE (OUTPATIENT)
Dept: CARDIOLOGY | Facility: CLINIC | Age: 57
End: 2022-03-21

## 2022-03-21 NOTE — TELEPHONE ENCOUNTER
Repatha approval.   The authorization is effective for a maximum of 6 fills from 03/21/2022 to 09/20/2022 Patient aware. Lana Gallego LPN

## 2022-03-30 ENCOUNTER — TELEPHONE (OUTPATIENT)
Dept: CARDIOLOGY | Facility: CLINIC | Age: 57
End: 2022-03-30

## 2022-03-30 NOTE — TELEPHONE ENCOUNTER
Pt called and states, her whole lt side of her body  is tingly and numb, including her face after falling 30 min's prior.    Patient has been notified to go to ER. States she is being taken to ER at this time.

## 2022-03-31 ENCOUNTER — TELEPHONE (OUTPATIENT)
Dept: NEUROLOGY | Facility: CLINIC | Age: 57
End: 2022-03-31

## 2022-03-31 NOTE — TELEPHONE ENCOUNTER
DR. ZHANG    [facility] CALLED TO SCHED 1 WEEK HOSPITAL FOLLOW UP FOR PATIENT - STROKE    DR. ZHANG DIDN'T HAVE ANYTHING THAT SOON BUT THERE WAS AN OPENING WITH DANYEL 4-12-22 SO I SCHEDULED PATIENT THEN.    WILL MAIL OUT REMINDER BUT IF THERE'S AN ISSUE WITH THIS APPT DATE OR PROVIDER LET ME KNOW AND I CAN CALL PATIENT    I TRIED TO CALL FOR MEDICAL RECORDS BUT THE WAIT WAS TOO LONG AND I HAD TO HANG UP. THE PHONE # FOR MED RECS THEY HAD LISTED ON THEIR SITE WAS OUT OF SERVICE SO I HAD TO CALL [facility] # [phone]

## 2022-04-12 ENCOUNTER — TELEPHONE (OUTPATIENT)
Dept: NEUROLOGY | Facility: CLINIC | Age: 57
End: 2022-04-12

## 2022-04-12 ENCOUNTER — OFFICE VISIT (OUTPATIENT)
Dept: NEUROLOGY | Facility: CLINIC | Age: 57
End: 2022-04-12

## 2022-04-12 VITALS
HEIGHT: 67 IN | OXYGEN SATURATION: 95 % | SYSTOLIC BLOOD PRESSURE: 124 MMHG | WEIGHT: 209.2 LBS | BODY MASS INDEX: 32.83 KG/M2 | HEART RATE: 78 BPM | DIASTOLIC BLOOD PRESSURE: 70 MMHG | TEMPERATURE: 96.8 F

## 2022-04-12 DIAGNOSIS — E66.9 DIABETES MELLITUS TYPE 2 IN OBESE: ICD-10-CM

## 2022-04-12 DIAGNOSIS — Z96.89 STATUS POST VNS (VAGUS NERVE STIMULATOR) PLACEMENT: ICD-10-CM

## 2022-04-12 DIAGNOSIS — Z86.73 HISTORY OF TIA (TRANSIENT ISCHEMIC ATTACK): Primary | ICD-10-CM

## 2022-04-12 DIAGNOSIS — E66.09 CLASS 1 OBESITY DUE TO EXCESS CALORIES WITH SERIOUS COMORBIDITY AND BODY MASS INDEX (BMI) OF 32.0 TO 32.9 IN ADULT: ICD-10-CM

## 2022-04-12 DIAGNOSIS — G40.019 PARTIAL IDIOPATHIC EPILEPSY WITH SEIZURES OF LOCALIZED ONSET, INTRACTABLE, WITHOUT STATUS EPILEPTICUS: ICD-10-CM

## 2022-04-12 DIAGNOSIS — E11.69 DIABETES MELLITUS TYPE 2 IN OBESE: ICD-10-CM

## 2022-04-12 DIAGNOSIS — G47.33 OSA (OBSTRUCTIVE SLEEP APNEA): ICD-10-CM

## 2022-04-12 PROCEDURE — 99214 OFFICE O/P EST MOD 30 MIN: CPT | Performed by: NURSE PRACTITIONER

## 2022-04-12 RX ORDER — FAMOTIDINE 20 MG/1
20 TABLET, FILM COATED ORAL NIGHTLY
COMMUNITY
Start: 2022-04-11

## 2022-04-12 RX ORDER — OXCARBAZEPINE 150 MG/1
150 TABLET, FILM COATED ORAL 2 TIMES DAILY
COMMUNITY
End: 2022-08-24 | Stop reason: SDUPTHER

## 2022-04-12 RX ORDER — DEXLANSOPRAZOLE 60 MG/1
60 CAPSULE, DELAYED RELEASE ORAL DAILY
COMMUNITY
Start: 2022-04-07

## 2022-04-12 NOTE — PROGRESS NOTES
Neuro Office Visit      Encounter Date: 2022   Patient Name: Phyllis Iyer  : 1965   MRN: [id number]     Chief Complaint:    Chief Complaint   Patient presents with   • Stroke       History of Present Illness: Phyllis Iyer is a 57 y.o. female who is here today in Neurology for  TIA, Sz disorder.    Last visit 21 w Dr Nguyen- Cont OXC, GBP, rx for new cpap    Still with right hand and right foot is numb and weak. She is not tripping. Having some right foot drop. Declines AFO.    SZ  VNS turned off.  3 sz a years. Last seizure was 2022.  Usual sz consists of right facial, eye, tongue drawing. Bites her tongue. No loss of bladder or bowel. Usually happens at night.  Taking her meds. No missed doses. No side effects.   bid w  qid    TIA/CVA  Interval History  3/30/2022 admitted at [facility] for TIA sx of right sided weakness and numbness.  Describes walking down the [place] and her right leg gave out. Developed RUE and RLE numbness and weakness. No vision changes, slurred speech or confusion. Symptoms mostly resolved by the time she arrived in ED.  Overall, it was felt her symptoms were consistent with TIA.  CTH-calcified meningioma right frontal lobe 7.5 mm in diameter similar to previous study. No hemorrhage.  CTA head and neck-no stenosis  MRI brain not done due to VNS  Echo-neg bubble study w nml EF 66%  A1c 10.2  Dc'd on lopresser 25 bid, imdur 60, plavix 75, asa 81, Crestor 20, trileptal 600 bid    PMH: CAD with multiple stents, T2DM, HLD, HTN, Sx disorder with VNS    Subjective      Past Medical History:   Past Medical History:   Diagnosis Date   • Arthritis    • CAD (coronary artery disease)    • Chest tightness or pressure    • Complex partial seizure (HCC)    • Diabetes mellitus (HCC)    • Dyslipidemia    • FHx: migraine headaches    • GERD (gastroesophageal reflux disease)    • Hiatal hernia    • Hypercholesterolemia    • Hypertension    • Myocardial  infarction (HCC)    • JOSE on CPAP    • Stroke syndrome    • Stroke syndrome        Past Surgical History:   Past Surgical History:   Procedure Laterality Date   • CARDIAC CATHETERIZATION      11 stents   • CORONARY ANGIOPLASTY WITH STENT PLACEMENT     • CORONARY STENT PLACEMENT      x5   • LUMBAR FUSION  07/21/2014    decompression and fusion L4/5 Dr. Palm       Family History:   Family History   Problem Relation Age of Onset   • Heart disease Other    • Diabetes Other    • Cancer Other    • Stroke Other    • Coronary artery disease Mother    • Heart attack Mother    • Coronary artery disease Father        Social History:   Social History     Socioeconomic History   • Marital status:    Tobacco Use   • Smoking status: Former Smoker   • Smokeless tobacco: Never Used   Vaping Use   • Vaping Use: Never used   Substance and Sexual Activity   • Alcohol use: No       Medications:     Current Outpatient Medications:   •  aspirin 81 MG tablet, Take 1 tablet by mouth Daily., Disp: 30 tablet, Rfl: 11  •  clopidogrel (PLAVIX) 75 MG tablet, Take 1 tablet by mouth Daily., Disp: 90 tablet, Rfl: 3  •  Dexilant 60 MG capsule, , Disp: , Rfl:   •  DULoxetine (CYMBALTA) 30 MG capsule, Take 30 mg by mouth Daily., Disp: , Rfl:   •  Evolocumab with Infusor (REPATHA) solution cartridge, Inject 3.5 mL under the skin into the appropriate area as directed Every 30 (Thirty) Days., Disp: 3.5 mL, Rfl: 11  •  famotidine (PEPCID) 20 MG tablet, , Disp: , Rfl:   •  gabapentin (NEURONTIN) 600 MG tablet, 4 (Four) Times a Day., Disp: , Rfl:   •  HumuLIN R U-500 KwikPen 500 UNIT/ML solution pen-injector CONCENTRATED injection, , Disp: , Rfl:   •  hydroCHLOROthiazide (HYDRODIURIL) 25 MG tablet, Take 25 mg by mouth Daily., Disp: , Rfl:   •  HYDROcodone-acetaminophen (NORCO) 7.5-325 MG per tablet, Take 1 tablet by mouth Every 6 (Six) Hours As Needed for Moderate Pain (4-6)., Disp: , Rfl:   •  isosorbide mononitrate (IMDUR) 60 MG 24 hr tablet,  Take 1 tablet by mouth Daily., Disp: 30 tablet, Rfl: 0  •  metoprolol tartrate (LOPRESSOR) 25 MG tablet, TAKE ONE TABLET BY MOUTH TWICE A DAY, Disp: 60 tablet, Rfl: 5  •  nitroglycerin (Nitrostat) 0.4 MG SL tablet, Place 1 tablet under the tongue Every 5 (Five) Minutes As Needed for Chest Pain. prn, Disp: 25 tablet, Rfl: 1  •  OXcarbazepine (TRILEPTAL) 150 MG tablet, Take 150 mg by mouth 2 (Two) Times a Day., Disp: , Rfl:   •  pantoprazole (PROTONIX) 40 MG EC tablet, 2 (Two) Times a Day., Disp: , Rfl:   •  promethazine (PHENERGAN) 25 MG tablet, Take 25 mg by mouth Every 6 (Six) Hours As Needed for Nausea or Vomiting., Disp: , Rfl:   •  rosuvastatin (CRESTOR) 20 MG tablet, Take 1 tablet by mouth Every Night., Disp: 30 tablet, Rfl: 11  •  traZODone (DESYREL) 150 MG tablet, TAKE ONE TABLET BY MOUTH EVERY NIGHT, Disp: 30 tablet, Rfl: 4  •  TRESIBA FLEXTOUCH 100 UNIT/ML solution pen-injector injection, 50 Units 2 (two) times a day., Disp: , Rfl:     Allergies:   Allergies   Allergen Reactions   • Sulfa Antibiotics GI Intolerance       PHQ-9 Total Score:     STEADI Fall Risk Assessment has not been completed.    Objective     Physical Exam:   Physical Exam  Eyes:      Pupils: Pupils are equal, round, and reactive to light.   Neurological:      Mental Status: She is oriented to person, place, and time.      Coordination: Finger-Nose-Finger Test, Heel to Shin Test and Romberg Test normal.      Gait: Gait is intact.      Deep Tendon Reflexes:      Reflex Scores:       Tricep reflexes are 2+ on the right side and 2+ on the left side.       Bicep reflexes are 2+ on the right side and 2+ on the left side.       Brachioradialis reflexes are 2+ on the right side and 2+ on the left side.       Patellar reflexes are 2+ on the right side and 2+ on the left side.       Achilles reflexes are 2+ on the right side and 2+ on the left side.  Psychiatric:         Speech: Speech normal.         Neurologic Exam     Mental Status   Oriented  to person, place, and time.   Follows 3 step commands.   Attention: normal. Concentration: normal.   Speech: speech is normal   Level of consciousness: alert  Knowledge: consistent with education.   Normal comprehension.     Cranial Nerves     CN III, IV, VI   Pupils are equal, round, and reactive to light.  Right pupil: Accommodation: intact.   Left pupil: Accommodation: intact.   CN III: no CN III palsy  CN VI: no CN VI palsy  Nystagmus: none   Diplopia: none  Upgaze: normal  Downgaze: normal  Conjugate gaze: present    CN VII   Facial expression full, symmetric.     CN VIII   Hearing: intact    CN XII   CN XII normal.     Motor Exam   Muscle bulk: normal  Overall muscle tone: normal    Strength   Right biceps: 4/5  Left biceps: 5/5  Right triceps: 4/5  Left triceps: 5/5  Right wrist flexion: 4/5  Left wrist flexion: 5/5  Right wrist extension: 4/5  Left wrist extension: 5/5  Right interossei: 4/5  Left interossei: 5/5  Right abdominals: 4/5  Left abdominals: 5/5  Right iliopsoas: 4/5  Left iliopsoas: 5/5  Right quadriceps: 4/5  Left quadriceps: 5/5  Right hamstrin/5  Left hamstrin/5  Right glutei: 4/5  Left glutei: 5/5  Right anterior tibial: 4/5  Left anterior tibial: 5/5  Right posterior tibial: 4/5  Left posterior tibial: 5/5  Right peroneal: 4/5  Left peroneal: 5/5  Right gastroc: 4/5  Left gastroc: 5/5    Sensory Exam   Light touch normal.     Gait, Coordination, and Reflexes     Gait  Gait: normal    Coordination   Romberg: negative  Finger to nose coordination: normal  Heel to shin coordination: normal    Tremor   Resting tremor: absent  Action tremor: absent    Reflexes   Right brachioradialis: 2+  Left brachioradialis: 2+  Right biceps: 2+  Left biceps: 2+  Right triceps: 2+  Left triceps: 2+  Right patellar: 2+  Left patellar: 2+  Right achilles: 2+  Left achilles: 2+  Right : 2+  Left : 2+       Vital Signs:   Vitals:    22 1409   BP: 124/70   Pulse: 78   Temp: 96.8 °F (36 °C)  "  SpO2: 95%   Weight: 94.9 kg (209 lb 3.2 oz)   Height: 170.2 cm (67.01\")     Body mass index is 32.76 kg/m².         Assessment / Plan      Assessment/Plan:   Diagnoses and all orders for this visit:    1. History of TIA (transient ischemic attack) (Primary)  Comments:  Cont asa, plavix and statin    2. JOSE (obstructive sleep apnea)  Comments:  Referral made for new sleep study  Orders:  -     Ambulatory Referral to Sleep Medicine    3. Partial idiopathic epilepsy with seizures of localized onset, intractable, without status epilepticus (HCC)  Comments:  Cont current meds-OXC, GBP. VNS is turned off from last visit.    4. Status post VNS (vagus nerve stimulator) placement    5. Diabetes mellitus type 2 in obese (HCC)  Comments:  A1c goal < 7.0. Follow up with Endo.    6. Class 1 obesity due to excess calories with serious comorbidity and body mass index (BMI) of 32.0 to 32.9 in adult  Comments:  BMI goal < 25       She does have weakness on the right side. Unable to obtain MRI to confirm TIA vs CVA. She feels her episode was not a seizure.  Will continue to monitor her with VNS off. If she has increased freq of seizure may turn it back on.  She is on appropriate medications to prevent future CVA. Encouraged her to lower A1c < 7.0. She has significant risk factors for CVA with known CAD, uncontrolled DM, JOSE, obesity. Encourage weight loss.   Close follow up.    Patient Education:       Reviewed medications, potential side effects and signs and symptoms to report. Discussed risk versus benefits of treatment plan with patient and/or family-including medications, labs and radiology that may be ordered. Addressed questions and concerns during visit. Patient and/or family verbalized understanding and agree with plan. Instructed to call the office with any questions and report to ER with any life-threatening symptoms.     Follow Up:   Return in about 3 months (around 7/12/2022) for Recheck.    During this visit the " following were done:  Labs Reviewed [x]    Labs Ordered []    Radiology Reports Reviewed [x]    Radiology Ordered []    PCP Records Reviewed [x]    Referring Provider Records Reviewed []    ER Records Reviewed []    Hospital Records Reviewed [x]    History Obtained From Family []    Radiology Images Reviewed []    Other Reviewed [x]    Records Requested []       Javier Galan, SOSA, APRN

## 2022-04-12 NOTE — TELEPHONE ENCOUNTER
Agata Choudhury Stephanie S, MA  Per patient she would like a new machine. Also, she would like a home sleep study. Patient would like to use someone closer to home. Also, old sleep study is not in chart and this will be required for a new machine.   Agata     
Statement Selected

## 2022-06-06 DIAGNOSIS — I10 ESSENTIAL HYPERTENSION: ICD-10-CM

## 2022-06-06 DIAGNOSIS — R00.2 PALPITATIONS: ICD-10-CM

## 2022-07-01 ENCOUNTER — OFFICE VISIT (OUTPATIENT)
Dept: NEUROSURGERY | Facility: CLINIC | Age: 57
End: 2022-07-01

## 2022-07-01 VITALS
HEART RATE: 69 BPM | SYSTOLIC BLOOD PRESSURE: 102 MMHG | BODY MASS INDEX: 33.27 KG/M2 | HEIGHT: 67 IN | WEIGHT: 212 LBS | DIASTOLIC BLOOD PRESSURE: 90 MMHG | OXYGEN SATURATION: 99 %

## 2022-07-01 DIAGNOSIS — M54.16 LUMBAR RADICULOPATHY: ICD-10-CM

## 2022-07-01 DIAGNOSIS — G89.29 CHRONIC BILATERAL LOW BACK PAIN WITH LEFT-SIDED SCIATICA: Primary | ICD-10-CM

## 2022-07-01 DIAGNOSIS — Z98.1 HISTORY OF LUMBAR FUSION: ICD-10-CM

## 2022-07-01 DIAGNOSIS — M54.42 CHRONIC BILATERAL LOW BACK PAIN WITH LEFT-SIDED SCIATICA: Primary | ICD-10-CM

## 2022-07-01 DIAGNOSIS — M51.36 DISC DEGENERATION, LUMBAR: ICD-10-CM

## 2022-07-01 DIAGNOSIS — Z96.89 S/P PLACEMENT OF VNS (VAGUS NERVE STIMULATION) DEVICE: ICD-10-CM

## 2022-07-01 PROCEDURE — 99213 OFFICE O/P EST LOW 20 MIN: CPT | Performed by: PHYSICIAN ASSISTANT

## 2022-07-01 NOTE — PROGRESS NOTES
Patient: Phyllis Iyer  : 1965  Chart #: [id number]    Date of Service: 2022    CHIEF COMPLAINT: Low back pain    History of Present Illness Ms. Iyer is seen in follow-up.  She is a 57-year-old woman with a PMH significant for peripheral neuropathy, DM 2, CAD (on aspirin and Plavix).  She is known to Dr. Palm service for undergoing VNS placement in  for seizure disorder and L4-5 PLIF in .  She was evaluated in our office a couple times last year for low back pain.  She has been treated with formal physical therapy-does not provide much relief.  She follows with pain management and has undergone multiple epidural injections without relief.  She is prescribed Neurontin and Norco daily.  About a month or so ago she had a severe flareup of low back and right leg symptoms.  Pain was so bad in her leg that she had to use a rolling walker.  She also noted some bruising in the posterior knee.  She saw her pain doctor for an injection.  Ultimately with time symptoms settle down.  Her leg is better.  She continues with baseline axial back pain.  Last myelogram shows some mild stenosis at L3-4.      Past Medical History:   Diagnosis Date   • Arthritis    • CAD (coronary artery disease)    • Chest tightness or pressure    • Complex partial seizure (HCC)    • Diabetes mellitus (HCC)    • Dyslipidemia    • FHx: migraine headaches    • GERD (gastroesophageal reflux disease)    • Hiatal hernia    • Hypercholesterolemia    • Hypertension    • Myocardial infarction (HCC)    • JOSE on CPAP    • Stroke syndrome    • Stroke syndrome          Current Outpatient Medications:   •  aspirin 81 MG tablet, Take 1 tablet by mouth Daily., Disp: 30 tablet, Rfl: 11  •  clopidogrel (PLAVIX) 75 MG tablet, Take 1 tablet by mouth Daily., Disp: 90 tablet, Rfl: 3  •  Dexilant 60 MG capsule, , Disp: , Rfl:   •  DULoxetine (CYMBALTA) 30 MG capsule, Take 30 mg by mouth Daily., Disp: , Rfl:   •  Evolocumab with Infusor (REPATHA)  solution cartridge, Inject 3.5 mL under the skin into the appropriate area as directed Every 30 (Thirty) Days., Disp: 3.5 mL, Rfl: 11  •  famotidine (PEPCID) 20 MG tablet, , Disp: , Rfl:   •  gabapentin (NEURONTIN) 600 MG tablet, 4 (Four) Times a Day., Disp: , Rfl:   •  HumuLIN R U-500 KwikPen 500 UNIT/ML solution pen-injector CONCENTRATED injection, , Disp: , Rfl:   •  hydroCHLOROthiazide (HYDRODIURIL) 25 MG tablet, Take 25 mg by mouth Daily., Disp: , Rfl:   •  HYDROcodone-acetaminophen (NORCO) 7.5-325 MG per tablet, Take 1 tablet by mouth Every 6 (Six) Hours As Needed for Moderate Pain (4-6)., Disp: , Rfl:   •  isosorbide mononitrate (IMDUR) 60 MG 24 hr tablet, Take 1 tablet by mouth Daily., Disp: 30 tablet, Rfl: 0  •  metoprolol tartrate (LOPRESSOR) 25 MG tablet, TAKE 1 TABLET BY MOUTH TWICE DAILY, Disp: 60 tablet, Rfl: 11  •  nitroglycerin (Nitrostat) 0.4 MG SL tablet, Place 1 tablet under the tongue Every 5 (Five) Minutes As Needed for Chest Pain. prn, Disp: 25 tablet, Rfl: 1  •  OXcarbazepine (TRILEPTAL) 150 MG tablet, Take 150 mg by mouth 2 (Two) Times a Day., Disp: , Rfl:   •  pantoprazole (PROTONIX) 40 MG EC tablet, 2 (Two) Times a Day., Disp: , Rfl:   •  promethazine (PHENERGAN) 25 MG tablet, Take 25 mg by mouth Every 6 (Six) Hours As Needed for Nausea or Vomiting., Disp: , Rfl:   •  rosuvastatin (CRESTOR) 20 MG tablet, Take 1 tablet by mouth Every Night., Disp: 30 tablet, Rfl: 11  •  traZODone (DESYREL) 150 MG tablet, TAKE ONE TABLET BY MOUTH EVERY NIGHT, Disp: 30 tablet, Rfl: 4    Past Surgical History:   Procedure Laterality Date   • CARDIAC CATHETERIZATION      11 stents   • CORONARY ANGIOPLASTY WITH STENT PLACEMENT     • CORONARY STENT PLACEMENT      x5   • LUMBAR FUSION  07/21/2014    decompression and fusion L4/5 Dr. Palm       Social History     Socioeconomic History   • Marital status:    Tobacco Use   • Smoking status: Former Smoker   • Smokeless tobacco: Never Used   Vaping Use   •  "Vaping Use: Never used   Substance and Sexual Activity   • Alcohol use: No   • Drug use: Defer   • Sexual activity: Defer         Review of Systems    Objective   Vital Signs: Blood pressure 102/90, pulse 69, height 170.2 cm (67.01\"), weight 96.2 kg (212 lb), SpO2 99 %, not currently breastfeeding.  Physical Exam  Vitals and nursing note reviewed.   Constitutional:       General: She is not in acute distress.     Appearance: She is well-developed.   HENT:      Head: Normocephalic and atraumatic.   Eyes:      Pupils: Pupils are equal, round, and reactive to light.   Cardiovascular:      Heart sounds: Normal heart sounds.   Pulmonary:      Breath sounds: Normal breath sounds.   Psychiatric:         Behavior: Behavior normal.         Thought Content: Thought content normal.     Musculoskeletal:     Strength is intact in upper and lower extremities to direct testing.     Station and gait are normal.     Straight leg raising is negative.   Neurologic:     Muscle tone is normal throughout.     Coordination is intact.     Sensation is intact to light touch throughout.     Patient is oriented to person, place, and time.         Independent review of radiographic imaging: No new studies    Assessment & Plan   Diagnosis:  1.  Chronic low back pain  2.  History of lumbar fusion L4-5, 2014  3.  Seizure disorder status post VNS    Medical Decision Making: Fortunately patient's symptoms have settled down with time.  If she develops recurrent leg pain or symptoms of neurogenic claudication she will notify our office for a follow-up appointment.    Diagnoses and all orders for this visit:    1. Chronic bilateral low back pain with left-sided sciatica (Primary)    2. Disc degeneration, lumbar    3. Lumbar radiculopathy    4. History of lumbar fusion    5. S/P placement of VNS (vagus nerve stimulation) device                        BMI is >= 30 and <35. (Class 1 Obesity). The following options were offered after discussion;: " nutrition counseling/recommendations         Lidya Simmons PA-C  Patient Care Team:  Jack Early MD as PCP - General  Jack Early MD as Referring Physician (Family Medicine)

## 2022-08-02 ENCOUNTER — OFFICE VISIT (OUTPATIENT)
Dept: CARDIOLOGY | Facility: CLINIC | Age: 57
End: 2022-08-02

## 2022-08-02 VITALS
OXYGEN SATURATION: 99 % | WEIGHT: 209 LBS | DIASTOLIC BLOOD PRESSURE: 87 MMHG | BODY MASS INDEX: 32.8 KG/M2 | HEART RATE: 72 BPM | SYSTOLIC BLOOD PRESSURE: 134 MMHG | HEIGHT: 67 IN

## 2022-08-02 DIAGNOSIS — R00.2 PALPITATIONS: ICD-10-CM

## 2022-08-02 DIAGNOSIS — G45.9 TIA (TRANSIENT ISCHEMIC ATTACK): ICD-10-CM

## 2022-08-02 DIAGNOSIS — I25.10 CORONARY ARTERY DISEASE INVOLVING NATIVE HEART, UNSPECIFIED VESSEL OR LESION TYPE, UNSPECIFIED WHETHER ANGINA PRESENT: Primary | ICD-10-CM

## 2022-08-02 DIAGNOSIS — R06.02 SOB (SHORTNESS OF BREATH): ICD-10-CM

## 2022-08-02 DIAGNOSIS — R07.9 CHEST PAIN, UNSPECIFIED TYPE: ICD-10-CM

## 2022-08-02 PROCEDURE — 99214 OFFICE O/P EST MOD 30 MIN: CPT | Performed by: PHYSICIAN ASSISTANT

## 2022-08-02 RX ORDER — INSULIN LISPRO 100 [IU]/ML
INJECTION, SOLUTION INTRAVENOUS; SUBCUTANEOUS
COMMUNITY
Start: 2022-07-08 | End: 2022-08-02 | Stop reason: ALTCHOICE

## 2022-08-02 RX ORDER — PEN NEEDLE, DIABETIC 32GX 5/32"
NEEDLE, DISPOSABLE MISCELLANEOUS
COMMUNITY
Start: 2022-07-08 | End: 2022-08-24

## 2022-08-02 RX ORDER — INSULIN GLARGINE 100 [IU]/ML
50 INJECTION, SOLUTION SUBCUTANEOUS 2 TIMES DAILY
COMMUNITY
Start: 2022-07-07

## 2022-08-02 RX ORDER — SEMAGLUTIDE 1.34 MG/ML
INJECTION, SOLUTION SUBCUTANEOUS WEEKLY
COMMUNITY
Start: 2022-07-08

## 2022-08-02 NOTE — PROGRESS NOTES
"Problem list     Subjective   Phyllis Iyer is a 57 y.o. female     Chief Complaint   Patient presents with   • Coronary Artery Disease   • Essential Hypertension   • Transient Ischemic Attack     Problem list:     1. CAD  1.1 cardiac catheterization in 2011 with stenting to the mid LAD as well as proximal, mid, and distal RCA with medical management recommended  1.2 left heart catheterization January 2016 with stenting of the right coronary artery with nonobstructive disease otherwise  1.3 stress test November 2016 but no evidence ischemia and preserved LV function  1.4 cardiac catheterization 5/4/2018 by Dr. Malik because of non-ST elevation myocardial infarction demonstrating high-grade RCA disease status post stenting ×2.  60% IntraStent restenosis of the LAD with medical management recommended.  Normal LV function noted  1.5 cardiac catheterization July 2018 with stenting of the LAD because of refractory angina.  30 to 50% of the circumflex and RCA with medical management recommended  1.6 cardiac catheterization June 2020 because of unstable angina with stenting x1 of the RCA for subtotal stenosis.  Residual disease of the LAD 50% with an 80 to 90% diagonal.  Nonobstructive disease otherwise with medical management recommended  2.  Preserved systolic function  3.  Hypertension  4.  Dyslipidemia  5.  Diabetes mellitus  6.  Obstructive sleep apnea noncompliant with CPAP therapy    HPI    Patient is a 57-year-old female who presents back to the office for follow-up    Since last visit, patient was recently admitted for TIA/seizure.  According to the patient, she has been told multiple things but has since followed up with neurologist who was concerned about the possibility of TIA.    Patient apparently was going to get in the shower 1 day whenever she started noticing right lower extremity weakness.  She felt as if her foot was \"drawing\".  She then started having weakness on the right side of her face and body. "  She went to the emergency room and was admitted.  She underwent extensive cardiac work-up to include CT angio, echocardiogram with contrast all of which was largely unremarkable.  MRI could not be performed apparently because of a spinal stimulator that is in place.  Patient was discharged and has since followed up with neurology per her report.    She does not describe any recurrent symptoms and symptoms obviously resolved.  She has been experiencing occasional chest pain.  She has noticed a slight discomfort or pressure sensation.  This has occurred intermittently and can be similar to what she felt in the past.  Her dyspnea is mild but still noticeable.  She does not describe severe exertional dyspnea but can be short of breath even on a flat surface.  She does not describe PND or orthopnea.    She might occasionally feel palpitations but not routinely.  She does not describe any dizziness, presyncope or syncope.  She is stable otherwise    Current Outpatient Medications on File Prior to Visit   Medication Sig Dispense Refill   • aspirin 81 MG tablet Take 1 tablet by mouth Daily. 30 tablet 11   • BD Pen Needle Sherrill 2nd Gen 32G X 4 MM misc      • clopidogrel (PLAVIX) 75 MG tablet Take 1 tablet by mouth Daily. 90 tablet 3   • Dexilant 60 MG capsule      • DULoxetine (CYMBALTA) 30 MG capsule Take 30 mg by mouth Daily.     • Evolocumab with Infusor (REPATHA) solution cartridge Inject 3.5 mL under the skin into the appropriate area as directed Every 30 (Thirty) Days. 3.5 mL 11   • famotidine (PEPCID) 20 MG tablet      • gabapentin (NEURONTIN) 600 MG tablet 4 (Four) Times a Day.     • HumuLIN R U-500 KwikPen 500 UNIT/ML solution pen-injector CONCENTRATED injection      • hydroCHLOROthiazide (HYDRODIURIL) 25 MG tablet Take 25 mg by mouth Daily.     • HYDROcodone-acetaminophen (NORCO) 7.5-325 MG per tablet Take 1 tablet by mouth Every 6 (Six) Hours As Needed for Moderate Pain (4-6).     • Lantus SoloStar 100 UNIT/ML  injection pen      • metoprolol tartrate (LOPRESSOR) 25 MG tablet TAKE 1 TABLET BY MOUTH TWICE DAILY 60 tablet 11   • nitroglycerin (Nitrostat) 0.4 MG SL tablet Place 1 tablet under the tongue Every 5 (Five) Minutes As Needed for Chest Pain. prn 25 tablet 1   • OXcarbazepine (TRILEPTAL) 150 MG tablet Take 150 mg by mouth 2 (Two) Times a Day.     • Ozempic, 0.25 or 0.5 MG/DOSE, 2 MG/1.5ML solution pen-injector Inject  under the skin into the appropriate area as directed 1 (One) Time Per Week.     • pantoprazole (PROTONIX) 40 MG EC tablet Daily.     • promethazine (PHENERGAN) 25 MG tablet Take 25 mg by mouth Every 6 (Six) Hours As Needed for Nausea or Vomiting.     • rosuvastatin (CRESTOR) 20 MG tablet Take 1 tablet by mouth Every Night. 30 tablet 11   • traZODone (DESYREL) 150 MG tablet TAKE ONE TABLET BY MOUTH EVERY NIGHT 30 tablet 4     No current facility-administered medications on file prior to visit.       Sulfa antibiotics    Past Medical History:   Diagnosis Date   • Arthritis    • CAD (coronary artery disease)    • Chest tightness or pressure    • Complex partial seizure (HCC)    • Diabetes mellitus (HCC)    • Dyslipidemia    • FHx: migraine headaches    • GERD (gastroesophageal reflux disease)    • Hiatal hernia    • Hypercholesterolemia    • Hypertension    • Myocardial infarction (HCC)    • JOSE on CPAP    • Stroke syndrome    • Stroke syndrome        Social History     Socioeconomic History   • Marital status:    Tobacco Use   • Smoking status: Former Smoker   • Smokeless tobacco: Never Used   Vaping Use   • Vaping Use: Never used   Substance and Sexual Activity   • Alcohol use: No   • Drug use: Defer   • Sexual activity: Defer       Family History   Problem Relation Age of Onset   • Heart disease Other    • Diabetes Other    • Cancer Other    • Stroke Other    • Coronary artery disease Mother    • Heart attack Mother    • Coronary artery disease Father        Review of Systems   Constitutional:  "Negative.  Negative for chills and fever.   HENT: Negative.  Negative for congestion and sinus pressure.    Respiratory: Positive for shortness of breath. Negative for chest tightness.    Cardiovascular: Positive for chest pain (pressure), palpitations and leg swelling.   Gastrointestinal: Negative for abdominal pain, blood in stool, constipation, diarrhea, nausea and vomiting.   Endocrine: Negative.  Negative for cold intolerance and heat intolerance.   Genitourinary: Negative.  Negative for dysuria, frequency, hematuria and urgency.   Musculoskeletal: Positive for back pain (chronic).   Neurological: Positive for dizziness (with postion change at times). Negative for speech difficulty and light-headedness.   Psychiatric/Behavioral: Negative.  Negative for sleep disturbance (denies waking with soa or cp).       Objective   Vitals:    08/02/22 1442   BP: 134/87   BP Location: Left arm   Patient Position: Sitting   Pulse: 72   SpO2: 99%   Weight: 94.8 kg (209 lb)   Height: 170.2 cm (67\")      /87 (BP Location: Left arm, Patient Position: Sitting)   Pulse 72   Ht 170.2 cm (67\")   Wt 94.8 kg (209 lb)   SpO2 99%   BMI 32.73 kg/m²     Lab Results (most recent)     None          Physical Exam  Vitals and nursing note reviewed.   Constitutional:       General: She is not in acute distress.     Appearance: Normal appearance. She is well-developed.   HENT:      Head: Normocephalic and atraumatic.   Eyes:      General: No scleral icterus.        Right eye: No discharge.         Left eye: No discharge.      Conjunctiva/sclera: Conjunctivae normal.   Neck:      Vascular: No carotid bruit.   Cardiovascular:      Rate and Rhythm: Normal rate and regular rhythm.      Heart sounds: Normal heart sounds. No murmur heard.    No friction rub. No gallop.   Pulmonary:      Effort: Pulmonary effort is normal. No respiratory distress.      Breath sounds: Normal breath sounds. No wheezing or rales.   Chest:      Chest wall: No " tenderness.   Musculoskeletal:      Right lower leg: No edema.      Left lower leg: No edema.   Skin:     General: Skin is warm and dry.      Coloration: Skin is not pale.      Findings: No erythema or rash.   Neurological:      Mental Status: She is alert and oriented to person, place, and time.      Cranial Nerves: No cranial nerve deficit.   Psychiatric:         Behavior: Behavior normal.         Procedure   Procedures       Assessment & Plan     Problems Addressed this Visit        Cardiac and Vasculature    Coronary artery disease involving native heart - Primary    Relevant Orders    Cardiac Event Monitor    Stress Test With Myocardial Perfusion One Day    Chest pain    Relevant Orders    Cardiac Event Monitor    Stress Test With Myocardial Perfusion One Day    Palpitations    Relevant Orders    Cardiac Event Monitor    Stress Test With Myocardial Perfusion One Day       Neuro    TIA (transient ischemic attack)    Relevant Orders    Cardiac Event Monitor    Stress Test With Myocardial Perfusion One Day       Pulmonary and Pneumonias    SOB (shortness of breath)    Relevant Orders    Cardiac Event Monitor    Stress Test With Myocardial Perfusion One Day      Diagnoses       Codes Comments    Coronary artery disease involving native heart, unspecified vessel or lesion type, unspecified whether angina present    -  Primary ICD-10-CM: I25.10  ICD-9-CM: 414.01     Chest pain, unspecified type     ICD-10-CM: R07.9  ICD-9-CM: 786.50     SOB (shortness of breath)     ICD-10-CM: R06.02  ICD-9-CM: 786.05     Palpitations     ICD-10-CM: R00.2  ICD-9-CM: 785.1     TIA (transient ischemic attack)     ICD-10-CM: G45.9  ICD-9-CM: 435.9         Recommendation  1.  Patient is a 57-year-old female that presented to the emergency room with symptoms concerning for TIA.  Echocardiogram with contrast was negative and CT angio of the carotids with no significant obstructive disease.  From our standpoint, we want to schedule for  30-day event monitor to rule out any possibility of atrial fibrillation or flutter as a potential source of her TIA.    2.  With her complaints of chest discomfort, would like to schedule stress test.  She had residual disease with last catheterization with her chest pain, we want to evaluate further    3.  She has nitroglycerin available for chest pain.  Any chest pain, not resolved by nitroglycerin, I recommend her going to the emergency room    4.  She is on dual antiplatelet therapy and statin therapy.  We will see her back for follow-up after the above testing to recommend further.  Follow-up with primary as scheduled             Phyllis Iyer  reports that she has quit smoking. She has never used smokeless tobacco..     Patient brought in medicine list to appointment, it's been reviewed with patient and med list was updated in the chart.     Electronically signed by:

## 2022-08-08 RX ORDER — OXCARBAZEPINE 600 MG/1
600 TABLET, FILM COATED ORAL 2 TIMES DAILY
Qty: 180 TABLET | Refills: 0 | Status: SHIPPED | OUTPATIENT
Start: 2022-08-08 | End: 2022-11-03

## 2022-08-08 NOTE — TELEPHONE ENCOUNTER
Rx Refill Note  Requested Prescriptions     Pending Prescriptions Disp Refills   • OXcarbazepine (TRILEPTAL) 600 MG tablet [Pharmacy Med Name: OXCARBAZEPIN 600MG] 180 tablet 0     Sig: TAKE 1 TABLET BY MOUTH TWICE DAILY      Per Cb's last note, Continue OXC   Last filled: 7/30/2021 90 day with 3 refills  Sent in 90 day, no refills with note on script to PLEASE schedule a follow up appointment. Thanks!  Last office visit with prescribing clinician: 7/30/2021      Next office visit with prescribing clinician: Visit date not found     Josey Miller MA  08/08/22, 11:31 EDT

## 2022-08-16 ENCOUNTER — TELEPHONE (OUTPATIENT)
Dept: CARDIOLOGY | Facility: CLINIC | Age: 57
End: 2022-08-16

## 2022-08-18 ENCOUNTER — HOSPITAL ENCOUNTER (OUTPATIENT)
Dept: CARDIOLOGY | Facility: HOSPITAL | Age: 57
Discharge: HOME | End: 2022-08-18

## 2022-08-18 DIAGNOSIS — I25.10 CORONARY ARTERY DISEASE INVOLVING NATIVE HEART, UNSPECIFIED VESSEL OR LESION TYPE, UNSPECIFIED WHETHER ANGINA PRESENT: ICD-10-CM

## 2022-08-18 DIAGNOSIS — G45.9 TIA (TRANSIENT ISCHEMIC ATTACK): ICD-10-CM

## 2022-08-18 DIAGNOSIS — R06.02 SOB (SHORTNESS OF BREATH): ICD-10-CM

## 2022-08-18 DIAGNOSIS — R00.2 PALPITATIONS: ICD-10-CM

## 2022-08-18 DIAGNOSIS — R07.9 CHEST PAIN, UNSPECIFIED TYPE: ICD-10-CM

## 2022-08-18 PROCEDURE — 0 TECHNETIUM SESTAMIBI: Performed by: INTERNAL MEDICINE

## 2022-08-18 PROCEDURE — A9500 TC99M SESTAMIBI: HCPCS | Performed by: INTERNAL MEDICINE

## 2022-08-18 PROCEDURE — 78452 HT MUSCLE IMAGE SPECT MULT: CPT | Performed by: INTERNAL MEDICINE

## 2022-08-18 PROCEDURE — 25010000002 REGADENOSON 0.4 MG/5ML SOLUTION: Performed by: INTERNAL MEDICINE

## 2022-08-18 PROCEDURE — 78452 HT MUSCLE IMAGE SPECT MULT: CPT

## 2022-08-18 PROCEDURE — 93018 CV STRESS TEST I&R ONLY: CPT | Performed by: INTERNAL MEDICINE

## 2022-08-18 PROCEDURE — 93017 CV STRESS TEST TRACING ONLY: CPT

## 2022-08-18 RX ADMIN — REGADENOSON 0.4 MG: 0.08 INJECTION, SOLUTION INTRAVENOUS at 11:31

## 2022-08-18 RX ADMIN — TECHNETIUM TC 99M SESTAMIBI 1 DOSE: 1 INJECTION INTRAVENOUS at 11:31

## 2022-08-18 RX ADMIN — TECHNETIUM TC 99M SESTAMIBI 1 DOSE: 1 INJECTION INTRAVENOUS at 09:40

## 2022-08-19 LAB
BH CV REST NUCLEAR ISOTOPE DOSE: 10 MCI
BH CV STRESS COMMENTS STAGE 1: NORMAL
BH CV STRESS DOSE REGADENOSON STAGE 1: 0.4
BH CV STRESS DURATION MIN STAGE 1: 0
BH CV STRESS DURATION SEC STAGE 1: 10
BH CV STRESS NUCLEAR ISOTOPE DOSE: 30 MCI
BH CV STRESS PROTOCOL 1: NORMAL
BH CV STRESS RECOVERY BP: NORMAL MMHG
BH CV STRESS RECOVERY HR: 82 BPM
BH CV STRESS STAGE 1: 1
MAXIMAL PREDICTED HEART RATE: 161 BPM
PERCENT MAX PREDICTED HR: 42.24 %
STRESS BASELINE BP: NORMAL MMHG
STRESS BASELINE HR: 66 BPM
STRESS PERCENT HR: 50 %
STRESS POST PEAK BP: NORMAL MMHG
STRESS POST PEAK HR: 68 BPM
STRESS TARGET HR: 137 BPM

## 2022-08-22 ENCOUNTER — TELEPHONE (OUTPATIENT)
Dept: CARDIOLOGY | Facility: CLINIC | Age: 57
End: 2022-08-22

## 2022-08-22 NOTE — TELEPHONE ENCOUNTER
Patient informed of appointment to see Darryl AYALA this Wednesday, August 24 th @ 8:45 am. Patient verbalized understanding. Lana NICHOLS    ----- Message from LUCY Kern sent at 8/22/2022  9:07 AM EDT -----  2 to 3-week follow-up    Stress Test With Myocardial Perfusion One Day  Order: [id number]   Status: Final result     Visible to patient: Yes (not seen)     Dx: Palpitations; TIA (transient ischemic...     1 Result Note    Details    Reading Physician Reading Date Result Priority   Henry Stoner MD  [phone] 8/18/2022 Routine   Henry Stoner MD  [phone] 8/18/2022      Result Text  Scintigraphy demonstrates a large relatively fixed inferior and inferolateral perfusion defect with normal wall motion most compatible with attenuation artifact.  There is a small reversible defect confined to the apex compatible with ischemia.     2.  Preserved post stress ejection fraction of 66% with no focal wall motion abnormalities.     3.  No evidence of pharmacologically induced transient ischemic dilation.  Mildly increased lung heart radiopharmaceutical ratio 0.44 suggestive of increased LV filling pressures.

## 2022-08-24 ENCOUNTER — OFFICE VISIT (OUTPATIENT)
Dept: CARDIOLOGY | Facility: CLINIC | Age: 57
End: 2022-08-24

## 2022-08-24 VITALS
WEIGHT: 209.6 LBS | HEIGHT: 67 IN | SYSTOLIC BLOOD PRESSURE: 129 MMHG | HEART RATE: 81 BPM | DIASTOLIC BLOOD PRESSURE: 80 MMHG | OXYGEN SATURATION: 98 % | BODY MASS INDEX: 32.9 KG/M2

## 2022-08-24 DIAGNOSIS — R00.2 PALPITATIONS: ICD-10-CM

## 2022-08-24 DIAGNOSIS — R07.2 PRECORDIAL PAIN: Primary | ICD-10-CM

## 2022-08-24 DIAGNOSIS — R06.02 SOB (SHORTNESS OF BREATH): ICD-10-CM

## 2022-08-24 DIAGNOSIS — I25.10 CORONARY ARTERY DISEASE INVOLVING NATIVE HEART, UNSPECIFIED VESSEL OR LESION TYPE, UNSPECIFIED WHETHER ANGINA PRESENT: ICD-10-CM

## 2022-08-24 DIAGNOSIS — I25.10 CORONARY ARTERY DISEASE INVOLVING NATIVE CORONARY ARTERY OF NATIVE HEART WITHOUT ANGINA PECTORIS: ICD-10-CM

## 2022-08-24 DIAGNOSIS — I10 ESSENTIAL HYPERTENSION: ICD-10-CM

## 2022-08-24 DIAGNOSIS — E78.5 DYSLIPIDEMIA: Primary | ICD-10-CM

## 2022-08-24 DIAGNOSIS — E78.5 DYSLIPIDEMIA: ICD-10-CM

## 2022-08-24 PROCEDURE — 99214 OFFICE O/P EST MOD 30 MIN: CPT | Performed by: PHYSICIAN ASSISTANT

## 2022-08-24 RX ORDER — NITROGLYCERIN 0.4 MG/1
0.4 TABLET SUBLINGUAL
Qty: 25 TABLET | Refills: 1 | Status: SHIPPED | OUTPATIENT
Start: 2022-08-24

## 2022-08-24 RX ORDER — RANOLAZINE 500 MG/1
500 TABLET, EXTENDED RELEASE ORAL 2 TIMES DAILY
Qty: 60 TABLET | Refills: 6 | Status: SHIPPED | OUTPATIENT
Start: 2022-08-24 | End: 2023-04-03

## 2022-08-24 NOTE — PROGRESS NOTES
Patient verified she is taking crestor and repatha, stated she will have lipids done downstairs. Lana Ocasio A: can you see if socorro méndez is taking crestor and repatha? I scheduled her for cath. IF she is, can we get lipids checked on her?

## 2022-08-24 NOTE — PROGRESS NOTES
Problem list:     1. CAD  1.1 cardiac catheterization in 2011 with stenting to the mid LAD as well as proximal, mid, and distal RCA with medical management recommended  1.2 left heart catheterization January 2016 with stenting of the right coronary artery with nonobstructive disease otherwise  1.3 stress test November 2016 but no evidence ischemia and preserved LV function  1.4 cardiac catheterization 5/4/2018 by Dr. Malik because of non-ST elevation myocardial infarction demonstrating high-grade RCA disease status post stenting ×2.  60% IntraStent restenosis of the LAD with medical management recommended.  Normal LV function noted  1.5 cardiac catheterization July 2018 with stenting of the LAD because of refractory angina.  30 to 50% of the circumflex and RCA with medical management recommended  1.6 cardiac catheterization June 2020 because of unstable angina with stenting x1 of the RCA for subtotal stenosis.  Residual disease of the LAD 50% with an 80 to 90% diagonal.  Nonobstructive disease otherwise with medical management recommended  1.7 stress test August 2022 with scintigraphy demonstrating a large relatively fixed inferior and inferolateral perfusion defect with normal wall motion most compatible with attenuation artifact.  There is a small reversible defect confined to the apex compatible with ischemia.  Preserved LV function noted  2.  Preserved systolic function  3.  Hypertension  4.  Dyslipidemia  5.  Diabetes mellitus  6.  Obstructive sleep apnea noncompliant with CPAP therapy  Subjective   Phyllis Iyer is a 57 y.o. female     Chief Complaint   Patient presents with   • Follow-up     Here for testing f/u   • Coronary Artery Disease   • Hyperlipidemia   • Hypertension   • Palpitations   • Transient Ischemic Attack   • Sleep Apnea       HPI    Patient is a 57-year-old female who presents to the office for evaluation.  Patient is here to review stress test that was ordered in setting of chest pain and  dyspnea in a patient with known coronary disease.  Apical ischemia was identified and there was a fixed inferior and inferolateral perfusion defect most compatible with attenuation    Patient has been treated medically but continues to have significant symptoms.  Despite being on antianginal medication, she continues to feel pressure.  This happens intermittently but she describes having significant heaviness in her chest.  Patient has not taken nitroglycerin but apparently has possibly considered it.  She is concerned.  She is short of breath when exerting even on a flat surface.  She does not describe progressive dyspnea.  She does not describe PND orthopnea.    Patient may occasionally palpitate.  She feels a fluttering sensation at times.  Patient does not describe dizziness, presyncope or syncope.  Otherwise patient is stable                      Current Outpatient Medications on File Prior to Visit   Medication Sig Dispense Refill   • aspirin 81 MG tablet Take 1 tablet by mouth Daily. 30 tablet 11   • clopidogrel (PLAVIX) 75 MG tablet Take 1 tablet by mouth Daily. 90 tablet 3   • Dexilant 60 MG capsule Take 60 mg by mouth Daily.     • DULoxetine (CYMBALTA) 30 MG capsule Take 30 mg by mouth Daily.     • Evolocumab with Infusor (REPATHA) solution cartridge Inject 3.5 mL under the skin into the appropriate area as directed Every 30 (Thirty) Days. 3.5 mL 11   • famotidine (PEPCID) 20 MG tablet Take 20 mg by mouth Every Night.     • gabapentin (NEURONTIN) 600 MG tablet 4 (Four) Times a Day.     • HumuLIN R U-500 KwikPen 500 UNIT/ML solution pen-injector CONCENTRATED injection 15 Units 3 (Three) Times a Day Before Meals.     • hydroCHLOROthiazide (HYDRODIURIL) 25 MG tablet Take 25 mg by mouth Daily.     • HYDROcodone-acetaminophen (NORCO) 7.5-325 MG per tablet Take 1 tablet by mouth Every 6 (Six) Hours As Needed for Moderate Pain (4-6).     • Lantus SoloStar 100 UNIT/ML injection pen Inject 50 Units under the skin  into the appropriate area as directed 2 (Two) Times a Day.     • metoprolol tartrate (LOPRESSOR) 25 MG tablet TAKE 1 TABLET BY MOUTH TWICE DAILY 60 tablet 11   • OXcarbazepine (TRILEPTAL) 600 MG tablet Take 1 tablet by mouth 2 (Two) Times a Day. Please contact Neurology office to schedule a follow up appointment. Thanks! 180 tablet 0   • Ozempic, 0.25 or 0.5 MG/DOSE, 2 MG/1.5ML solution pen-injector Inject  under the skin into the appropriate area as directed 1 (One) Time Per Week.     • pantoprazole (PROTONIX) 40 MG EC tablet Daily.     • promethazine (PHENERGAN) 25 MG tablet Take 25 mg by mouth Every 6 (Six) Hours As Needed for Nausea or Vomiting.     • rosuvastatin (CRESTOR) 20 MG tablet Take 1 tablet by mouth Every Night. 30 tablet 11   • traZODone (DESYREL) 150 MG tablet TAKE ONE TABLET BY MOUTH EVERY NIGHT 30 tablet 4   • [DISCONTINUED] BD Pen Needle Sherrill 2nd Gen 32G X 4 MM misc      • [DISCONTINUED] nitroglycerin (Nitrostat) 0.4 MG SL tablet Place 1 tablet under the tongue Every 5 (Five) Minutes As Needed for Chest Pain. prn 25 tablet 1   • [DISCONTINUED] OXcarbazepine (TRILEPTAL) 150 MG tablet Take 150 mg by mouth 2 (Two) Times a Day.       No current facility-administered medications on file prior to visit.       Sulfa antibiotics    Past Medical History:   Diagnosis Date   • Arthritis    • CAD (coronary artery disease)    • Chest tightness or pressure    • Complex partial seizure (HCC)    • Diabetes mellitus (HCC)    • Dyslipidemia    • FHx: migraine headaches    • GERD (gastroesophageal reflux disease)    • Hiatal hernia    • Hypercholesterolemia    • Hypertension    • Myocardial infarction (HCC)    • JOSE on CPAP    • Stroke syndrome    • Stroke syndrome        Social History     Socioeconomic History   • Marital status:    Tobacco Use   • Smoking status: Former Smoker   • Smokeless tobacco: Never Used   Vaping Use   • Vaping Use: Never used   Substance and Sexual Activity   • Alcohol use: No   •  "Drug use: Defer   • Sexual activity: Defer       Family History   Problem Relation Age of Onset   • Heart disease Other    • Diabetes Other    • Cancer Other    • Stroke Other    • Coronary artery disease Mother    • Heart attack Mother    • Coronary artery disease Father        Review of Systems   Constitutional: Positive for fatigue.   HENT: Negative.    Eyes: Positive for visual disturbance (glasses prn).   Respiratory: Positive for shortness of breath.    Cardiovascular: Positive for chest pain, palpitations and leg swelling.   Gastrointestinal: Negative.    Endocrine: Negative.    Genitourinary: Negative.    Musculoskeletal: Positive for arthralgias and myalgias.   Skin: Negative.    Allergic/Immunologic: Negative.    Neurological: Negative.  Seizures:      Hematological: Bruises/bleeds easily.   Psychiatric/Behavioral: Positive for sleep disturbance.       Objective   Vitals:    08/24/22 0838   BP: 129/80   BP Location: Left arm   Patient Position: Sitting   Pulse: 81   SpO2: 98%   Weight: 95.1 kg (209 lb 9.6 oz)   Height: 170.2 cm (67.01\")      /80 (BP Location: Left arm, Patient Position: Sitting)   Pulse 81   Ht 170.2 cm (67.01\")   Wt 95.1 kg (209 lb 9.6 oz)   SpO2 98%   BMI 32.82 kg/m²     Lab Results (most recent)     None          Physical Exam  Vitals and nursing note reviewed.   Constitutional:       General: She is not in acute distress.     Appearance: Normal appearance. She is well-developed.   HENT:      Head: Normocephalic and atraumatic.   Eyes:      General: No scleral icterus.        Right eye: No discharge.         Left eye: No discharge.      Conjunctiva/sclera: Conjunctivae normal.   Neck:      Vascular: No carotid bruit.   Cardiovascular:      Rate and Rhythm: Normal rate and regular rhythm.      Heart sounds: Normal heart sounds. No murmur heard.    No friction rub. No gallop.   Pulmonary:      Effort: Pulmonary effort is normal. No respiratory distress.      Breath sounds: " Normal breath sounds. No wheezing or rales.   Chest:      Chest wall: No tenderness.   Musculoskeletal:      Right lower leg: No edema.      Left lower leg: No edema.   Skin:     General: Skin is warm and dry.      Coloration: Skin is not pale.      Findings: No erythema or rash.   Neurological:      Mental Status: She is alert and oriented to person, place, and time.      Cranial Nerves: No cranial nerve deficit.   Psychiatric:         Behavior: Behavior normal.         Procedure   Procedures       Assessment & Plan     Problems Addressed this Visit        Cardiac and Vasculature    Coronary artery disease involving native heart    Relevant Medications    ranolazine (Ranexa) 500 MG 12 hr tablet    nitroglycerin (Nitrostat) 0.4 MG SL tablet    Other Relevant Orders    Case Request Cath Lab: Coronary angiography (Completed)    Essential hypertension    Relevant Orders    Case Request Cath Lab: Coronary angiography (Completed)    Dyslipidemia    Relevant Orders    Case Request Cath Lab: Coronary angiography (Completed)    Chest pain - Primary    Relevant Orders    Case Request Cath Lab: Coronary angiography (Completed)    Palpitations       Pulmonary and Pneumonias    SOB (shortness of breath)      Diagnoses       Codes Comments    Precordial pain    -  Primary ICD-10-CM: R07.2  ICD-9-CM: 786.51     Coronary artery disease involving native coronary artery of native heart without angina pectoris     ICD-10-CM: I25.10  ICD-9-CM: 414.01     Dyslipidemia     ICD-10-CM: E78.5  ICD-9-CM: 272.4     Essential hypertension     ICD-10-CM: I10  ICD-9-CM: 401.9     Palpitations     ICD-10-CM: R00.2  ICD-9-CM: 785.1     SOB (shortness of breath)     ICD-10-CM: R06.02  ICD-9-CM: 786.05         Recommendation  1.  Patient is a 57-year-old female who presents to the office for evaluation that had an apical ischemic wall defect.  She has been treated medically.  She has known disease involving the LAD approximately years ago with 50%  proximal disease and 80 to 90% diagonal disease.  Despite us treating her with antianginal medication, she continues to feel significant discomfort.  She would like definitive assessment.  Therefore because of progressive low-level symptoms despite antianginal therapy with positive stress test and known disease involving the LAD, catheterization will be scheduled for evaluation.  She would like to have this done in [place]    2.  Patient has been prescribed Ranexa and beta-blocker.  Because of her blood pressure, I would be hesitant with amlodipine and she has attempted isosorbide in the past apparently with headaches and adverse effects.  Hopefully that can be a percutaneous approach to improve symptoms    3.  Otherwise palpitations are minimal with no symptoms of cerebral embolic events.  No symptoms to suggest malignant arrhythmia    4.  I am adding Jardiance to her regimen.  She is on Repatha for lipid management.  We will see patient back for follow-up after cath.  Follow-up with primary as scheduled             Phyllis Iyer  reports that she has quit smoking. She has never used smokeless tobacco.. I have educated her on the risk of diseases from using tobacco products such as cancer, COPD and heart disease.               BMI is >= 30 and <35. (Class 1 Obesity). The following options were offered after discussion;: pcp addressing       Electronically signed by:

## 2022-08-29 ENCOUNTER — PREP FOR SURGERY (OUTPATIENT)
Dept: OTHER | Facility: HOSPITAL | Age: 57
End: 2022-08-29

## 2022-08-29 DIAGNOSIS — R07.2 PRECORDIAL PAIN: Primary | ICD-10-CM

## 2022-08-29 RX ORDER — ACETAMINOPHEN 325 MG/1
650 TABLET ORAL EVERY 4 HOURS PRN
Status: CANCELLED | OUTPATIENT
Start: 2022-08-29

## 2022-08-29 RX ORDER — SODIUM CHLORIDE 0.9 % (FLUSH) 0.9 %
10 SYRINGE (ML) INJECTION EVERY 12 HOURS SCHEDULED
Status: CANCELLED | OUTPATIENT
Start: 2022-08-29

## 2022-08-29 RX ORDER — ASPIRIN 81 MG/1
81 TABLET ORAL DAILY
Status: CANCELLED | OUTPATIENT
Start: 2022-08-30

## 2022-08-29 RX ORDER — NITROGLYCERIN 0.4 MG/1
0.4 TABLET SUBLINGUAL
Status: CANCELLED | OUTPATIENT
Start: 2022-08-29

## 2022-08-29 RX ORDER — SODIUM CHLORIDE 0.9 % (FLUSH) 0.9 %
10 SYRINGE (ML) INJECTION AS NEEDED
Status: CANCELLED | OUTPATIENT
Start: 2022-08-29

## 2022-08-29 RX ORDER — ASPIRIN 81 MG/1
324 TABLET, CHEWABLE ORAL ONCE
Status: CANCELLED | OUTPATIENT
Start: 2022-08-29 | End: 2022-08-29

## 2022-08-31 ENCOUNTER — TELEPHONE (OUTPATIENT)
Dept: CARDIOLOGY | Facility: CLINIC | Age: 57
End: 2022-08-31

## 2022-08-31 PROBLEM — E11.9 TYPE 2 DIABETES MELLITUS WITHOUT COMPLICATION, WITH LONG-TERM CURRENT USE OF INSULIN: Status: ACTIVE | Noted: 2019-02-06

## 2022-08-31 PROBLEM — Z96.89 S/P PLACEMENT OF VNS (VAGUS NERVE STIMULATION) DEVICE: Chronic | Status: RESOLVED | Noted: 2017-12-11 | Resolved: 2022-08-31

## 2022-08-31 PROBLEM — Z79.4 TYPE 2 DIABETES MELLITUS WITHOUT COMPLICATION, WITH LONG-TERM CURRENT USE OF INSULIN: Status: ACTIVE | Noted: 2019-02-06

## 2022-08-31 PROBLEM — E10.9 TYPE 1 DIABETES MELLITUS WITHOUT COMPLICATION: Status: ACTIVE | Noted: 2019-02-06

## 2022-08-31 PROBLEM — R07.9 CHEST PAIN: Status: RESOLVED | Noted: 2018-06-28 | Resolved: 2022-08-31

## 2022-08-31 PROBLEM — R06.02 SOB (SHORTNESS OF BREATH): Status: RESOLVED | Noted: 2018-02-14 | Resolved: 2022-08-31

## 2022-08-31 NOTE — TELEPHONE ENCOUNTER
MCT  Called pt to notify of no acute findings on testing, no answer lvm.  ----- Message from LUCY Kern sent at 8/31/2022  9:09 AM EDT -----  Routine follow-up

## 2022-09-01 ENCOUNTER — HOSPITAL ENCOUNTER (OUTPATIENT)
Facility: HOSPITAL | Age: 57
Setting detail: HOSPITAL OUTPATIENT SURGERY
Discharge: HOME | End: 2022-09-01
Attending: INTERNAL MEDICINE | Admitting: INTERNAL MEDICINE

## 2022-09-01 VITALS
HEIGHT: 67 IN | WEIGHT: 206.13 LBS | RESPIRATION RATE: 19 BRPM | SYSTOLIC BLOOD PRESSURE: 133 MMHG | BODY MASS INDEX: 32.35 KG/M2 | DIASTOLIC BLOOD PRESSURE: 73 MMHG | TEMPERATURE: 98 F | OXYGEN SATURATION: 96 % | HEART RATE: 72 BPM

## 2022-09-01 DIAGNOSIS — I25.10 CORONARY ARTERY DISEASE INVOLVING NATIVE CORONARY ARTERY OF NATIVE HEART WITHOUT ANGINA PECTORIS: ICD-10-CM

## 2022-09-01 DIAGNOSIS — I25.119 CORONARY ARTERY DISEASE INVOLVING NATIVE CORONARY ARTERY OF NATIVE HEART WITH ANGINA PECTORIS: Primary | ICD-10-CM

## 2022-09-01 DIAGNOSIS — E78.5 DYSLIPIDEMIA: ICD-10-CM

## 2022-09-01 DIAGNOSIS — R07.2 PRECORDIAL PAIN: ICD-10-CM

## 2022-09-01 DIAGNOSIS — I10 ESSENTIAL HYPERTENSION: ICD-10-CM

## 2022-09-01 PROBLEM — R00.2 PALPITATIONS: Status: RESOLVED | Noted: 2018-06-28 | Resolved: 2022-09-01

## 2022-09-01 PROBLEM — Z98.1 HISTORY OF LUMBAR FUSION: Status: RESOLVED | Noted: 2018-10-23 | Resolved: 2022-09-01

## 2022-09-01 LAB
ANION GAP SERPL CALCULATED.3IONS-SCNC: 14 MMOL/L (ref 5–15)
BUN SERPL-MCNC: 19 MG/DL (ref 6–20)
BUN/CREAT SERPL: 19.8 (ref 7–25)
CALCIUM SPEC-SCNC: 9 MG/DL (ref 8.6–10.5)
CHLORIDE SERPL-SCNC: 99 MMOL/L (ref 98–107)
CHOLEST SERPL-MCNC: 91 MG/DL (ref 0–200)
CO2 SERPL-SCNC: 25 MMOL/L (ref 22–29)
CREAT BLDA-MCNC: 0.9 MG/DL (ref 0.6–1.3)
CREAT SERPL-MCNC: 0.96 MG/DL (ref 0.57–1)
DEPRECATED RDW RBC AUTO: 40.2 FL (ref 37–54)
EGFRCR SERPLBLD CKD-EPI 2021: 68.3 ML/MIN/1.73
ERYTHROCYTE [DISTWIDTH] IN BLOOD BY AUTOMATED COUNT: 13.2 % (ref 12.3–15.4)
GLUCOSE SERPL-MCNC: 176 MG/DL (ref 65–99)
HBA1C MFR BLD: 8.1 % (ref 4.8–5.6)
HCT VFR BLD AUTO: 40.2 % (ref 34–46.6)
HDLC SERPL-MCNC: 36 MG/DL (ref 40–60)
HGB BLD-MCNC: 13.3 G/DL (ref 12–15.9)
LDLC SERPL CALC-MCNC: 36 MG/DL (ref 0–100)
LDLC/HDLC SERPL: 0.97 {RATIO}
MCH RBC QN AUTO: 28.2 PG (ref 26.6–33)
MCHC RBC AUTO-ENTMCNC: 33.1 G/DL (ref 31.5–35.7)
MCV RBC AUTO: 85.2 FL (ref 79–97)
PLATELET # BLD AUTO: 351 10*3/MM3 (ref 140–450)
PMV BLD AUTO: 9.5 FL (ref 6–12)
POTASSIUM SERPL-SCNC: 3 MMOL/L (ref 3.5–5.2)
RBC # BLD AUTO: 4.72 10*6/MM3 (ref 3.77–5.28)
SODIUM SERPL-SCNC: 138 MMOL/L (ref 136–145)
TRIGL SERPL-MCNC: 100 MG/DL (ref 0–150)
VLDLC SERPL-MCNC: 19 MG/DL (ref 5–40)
WBC NRBC COR # BLD: 10.73 10*3/MM3 (ref 3.4–10.8)

## 2022-09-01 PROCEDURE — 82565 ASSAY OF CREATININE: CPT

## 2022-09-01 PROCEDURE — 93458 L HRT ARTERY/VENTRICLE ANGIO: CPT | Performed by: INTERNAL MEDICINE

## 2022-09-01 PROCEDURE — 0 IOPAMIDOL PER 1 ML: Performed by: INTERNAL MEDICINE

## 2022-09-01 PROCEDURE — 25010000002 FENTANYL CITRATE (PF) 50 MCG/ML SOLUTION: Performed by: INTERNAL MEDICINE

## 2022-09-01 PROCEDURE — C1894 INTRO/SHEATH, NON-LASER: HCPCS | Performed by: INTERNAL MEDICINE

## 2022-09-01 PROCEDURE — 83036 HEMOGLOBIN GLYCOSYLATED A1C: CPT | Performed by: INTERNAL MEDICINE

## 2022-09-01 PROCEDURE — 80061 LIPID PANEL: CPT | Performed by: INTERNAL MEDICINE

## 2022-09-01 PROCEDURE — 36415 COLL VENOUS BLD VENIPUNCTURE: CPT

## 2022-09-01 PROCEDURE — C1769 GUIDE WIRE: HCPCS | Performed by: INTERNAL MEDICINE

## 2022-09-01 PROCEDURE — 80048 BASIC METABOLIC PNL TOTAL CA: CPT | Performed by: INTERNAL MEDICINE

## 2022-09-01 PROCEDURE — 85027 COMPLETE CBC AUTOMATED: CPT | Performed by: INTERNAL MEDICINE

## 2022-09-01 PROCEDURE — 25010000002 MIDAZOLAM PER 1 MG: Performed by: INTERNAL MEDICINE

## 2022-09-01 RX ORDER — SODIUM CHLORIDE 0.9 % (FLUSH) 0.9 %
10 SYRINGE (ML) INJECTION AS NEEDED
Status: DISCONTINUED | OUTPATIENT
Start: 2022-09-01 | End: 2022-09-01 | Stop reason: HOSPADM

## 2022-09-01 RX ORDER — SODIUM CHLORIDE 0.9 % (FLUSH) 0.9 %
10 SYRINGE (ML) INJECTION EVERY 12 HOURS SCHEDULED
Status: DISCONTINUED | OUTPATIENT
Start: 2022-09-01 | End: 2022-09-01 | Stop reason: HOSPADM

## 2022-09-01 RX ORDER — FENTANYL CITRATE 50 UG/ML
INJECTION, SOLUTION INTRAMUSCULAR; INTRAVENOUS AS NEEDED
Status: DISCONTINUED | OUTPATIENT
Start: 2022-09-01 | End: 2022-09-01 | Stop reason: HOSPADM

## 2022-09-01 RX ORDER — MIDAZOLAM HYDROCHLORIDE 1 MG/ML
INJECTION INTRAMUSCULAR; INTRAVENOUS AS NEEDED
Status: DISCONTINUED | OUTPATIENT
Start: 2022-09-01 | End: 2022-09-01 | Stop reason: HOSPADM

## 2022-09-01 RX ORDER — AMLODIPINE BESYLATE 2.5 MG/1
2.5 TABLET ORAL DAILY
Qty: 90 TABLET | Refills: 1 | Status: SHIPPED | OUTPATIENT
Start: 2022-09-01

## 2022-09-01 RX ORDER — NITROGLYCERIN 0.4 MG/1
0.4 TABLET SUBLINGUAL
Status: DISCONTINUED | OUTPATIENT
Start: 2022-09-01 | End: 2022-09-01 | Stop reason: HOSPADM

## 2022-09-01 RX ORDER — ASPIRIN 81 MG/1
324 TABLET, CHEWABLE ORAL ONCE
Status: DISCONTINUED | OUTPATIENT
Start: 2022-09-01 | End: 2022-09-01 | Stop reason: HOSPADM

## 2022-09-01 RX ORDER — ASPIRIN 81 MG/1
81 TABLET ORAL DAILY
Status: DISCONTINUED | OUTPATIENT
Start: 2022-09-02 | End: 2022-09-01 | Stop reason: HOSPADM

## 2022-09-01 RX ORDER — LIDOCAINE HYDROCHLORIDE 10 MG/ML
INJECTION, SOLUTION EPIDURAL; INFILTRATION; INTRACAUDAL; PERINEURAL AS NEEDED
Status: DISCONTINUED | OUTPATIENT
Start: 2022-09-01 | End: 2022-09-01 | Stop reason: HOSPADM

## 2022-09-01 RX ORDER — ACETAMINOPHEN 325 MG/1
650 TABLET ORAL EVERY 4 HOURS PRN
Status: DISCONTINUED | OUTPATIENT
Start: 2022-09-01 | End: 2022-09-01 | Stop reason: HOSPADM

## 2022-09-01 NOTE — INTERVAL H&P NOTE
H&P reviewed. The patient was examined and there are no changes to the H&P.      No changes to the physical exam    Active Hospital Problems    Diagnosis  POA    **Coronary artery disease involving native coronary artery of native heart with angina pectoris (HCC) [I25.119]  Yes     Priority: High     Cardiac catheterization procedure 2011): Mid LAD PCI  Cardiac catheterization (1/2016): PCI of RCA  Cardiac catheterization by Dr. Malik for NSTEMI (5/4/2018): PCI of RCA x3.  60% ISR of LAD.  Cardiac catheterization (7/2018): PCI of LAD.  Cardiac catheterization for unstable angina (6/2020): PCI of RCA.  Nuclear stress test (8/2022): Large fixed inferior and inferior lateral defect.  Small apical ischemia.      Type 2 diabetes mellitus without complication, with long-term current use of insulin ([facility]) [E11.9, Z79.4]  Not Applicable     Priority: High    Hyperlipidemia LDL goal <70 [E78.5]  Yes     Priority: Medium    Essential hypertension [I10]  Yes     Priority: Medium   Patient is a 57-year-old female with known coronary artery disease status post multiple PCI's who is being referred by Dr. Stoner's, LUCY Ocasio, to undergo cardiac catheterization for progressive CCS/NYHA class II symptoms despite 2 antianginals and recent stress test that showed large fixed inferior and inferior lateral defect with small apical ischemia.  The risks and benefits of the procedure were discussed and the patient is agreeable to proceed.  Of note the patient takes 81 mg aspirin and 75 mg Plavix daily.  He has been premedicated with 324 mg aspirin today.    Plan:  Lab results pending and if acceptable proceed with cardiac cath via the right radial approach  Further recommendations to follow    GAVIN Heck

## 2022-09-07 ENCOUNTER — TELEPHONE (OUTPATIENT)
Dept: CARDIOLOGY | Facility: CLINIC | Age: 57
End: 2022-09-07

## 2022-09-07 NOTE — TELEPHONE ENCOUNTER
Patient aware of appointment on September 13 th @ 10:00 am to see Darryl AYALA . Lana NICHOLS    ----- Message from LUCY Kern sent at 9/1/2022  2:07 PM EDT -----  3 to 4-week follow-up

## 2022-09-08 ENCOUNTER — TELEPHONE (OUTPATIENT)
Dept: CARDIOLOGY | Facility: CLINIC | Age: 57
End: 2022-09-08

## 2022-09-08 NOTE — TELEPHONE ENCOUNTER
Repatha: The request has been approved. The authorization is effective for a maximum of 12 fills from 09/08/2022 to 09/07/2023, as long as the member is enrolled in their current health plan. The request was approved as submitted. A written notification letter will follow with additional details.(Key: [id number])

## 2022-09-13 ENCOUNTER — OFFICE VISIT (OUTPATIENT)
Dept: CARDIOLOGY | Facility: CLINIC | Age: 57
End: 2022-09-13

## 2022-09-13 VITALS
OXYGEN SATURATION: 96 % | SYSTOLIC BLOOD PRESSURE: 144 MMHG | HEIGHT: 67 IN | DIASTOLIC BLOOD PRESSURE: 94 MMHG | WEIGHT: 203 LBS | HEART RATE: 90 BPM | BODY MASS INDEX: 31.86 KG/M2

## 2022-09-13 DIAGNOSIS — I10 ESSENTIAL HYPERTENSION: ICD-10-CM

## 2022-09-13 DIAGNOSIS — I25.119 CORONARY ARTERY DISEASE INVOLVING NATIVE CORONARY ARTERY OF NATIVE HEART WITH ANGINA PECTORIS: Primary | ICD-10-CM

## 2022-09-13 DIAGNOSIS — R06.02 SOB (SHORTNESS OF BREATH): ICD-10-CM

## 2022-09-13 PROCEDURE — 99214 OFFICE O/P EST MOD 30 MIN: CPT | Performed by: PHYSICIAN ASSISTANT

## 2022-09-13 RX ORDER — ISOSORBIDE MONONITRATE 30 MG/1
30 TABLET, EXTENDED RELEASE ORAL EVERY MORNING
Qty: 30 TABLET | Refills: 11 | Status: SHIPPED | OUTPATIENT
Start: 2022-09-13

## 2022-09-14 ENCOUNTER — APPOINTMENT (OUTPATIENT)
Dept: CARDIOLOGY | Facility: HOSPITAL | Age: 57
End: 2022-09-14

## 2022-11-02 ENCOUNTER — OFFICE VISIT (OUTPATIENT)
Dept: NEUROLOGY | Facility: CLINIC | Age: 57
End: 2022-11-02

## 2022-11-02 VITALS
DIASTOLIC BLOOD PRESSURE: 76 MMHG | SYSTOLIC BLOOD PRESSURE: 110 MMHG | WEIGHT: 208.6 LBS | HEIGHT: 67 IN | HEART RATE: 73 BPM | OXYGEN SATURATION: 95 % | TEMPERATURE: 97.3 F | BODY MASS INDEX: 32.74 KG/M2

## 2022-11-02 DIAGNOSIS — G47.33 OSA (OBSTRUCTIVE SLEEP APNEA): ICD-10-CM

## 2022-11-02 DIAGNOSIS — Z96.89 STATUS POST VNS (VAGUS NERVE STIMULATOR) PLACEMENT: ICD-10-CM

## 2022-11-02 DIAGNOSIS — R29.898 WEAKNESS OF RIGHT FOOT: Primary | ICD-10-CM

## 2022-11-02 DIAGNOSIS — I25.10 CORONARY ARTERY DISEASE INVOLVING NATIVE HEART, UNSPECIFIED VESSEL OR LESION TYPE, UNSPECIFIED WHETHER ANGINA PRESENT: ICD-10-CM

## 2022-11-02 DIAGNOSIS — Z86.73 HISTORY OF TIA (TRANSIENT ISCHEMIC ATTACK): ICD-10-CM

## 2022-11-02 PROCEDURE — 99214 OFFICE O/P EST MOD 30 MIN: CPT | Performed by: NURSE PRACTITIONER

## 2022-11-02 NOTE — PROGRESS NOTES
Neuro Office Visit      Encounter Date: 2022   Patient Name: Phyllis Iyer  : 1965   MRN: [id number]   PCP: Dr Jack Early  Chief Complaint:    Chief Complaint   Patient presents with   • Transient Ischemic Attack       History of Present Illness: Phyllis Iyer is a 57 y.o. female who is here today in Neurology for history of TIA/CVA, seizure disorder, JOSE.    Last visit 22 w me-Cont asa, plavix, and statin, refer to sleep med for new sleep study. Cont OXC, GBP, VNS turned off. Control stroke risk factors such as A1c, obesity, JOSE.    History of TIA/CVA  Right hand and right foot is numb and weak. Right foot will dany when walking. She can trip due to foot weakness.  Seen by cardiology and placed on Repatha, crestor stopped, but she has continued to take both Repatha and crestor. Abnml GXT  [facility] 22 w Dr Wills. Significant 1 vessel disease w high grade stenosis first diagonal of LAD jailed by 2 stents. Mild in-stent stenosis of mid LAD and patent RCA. EF 66%-medical management adding amlodipine.  PH  3/30/2022 admitted at [facility] for TIA sx of right sided weakness and numbness.  Describes walking down the [place] and her right leg gave out. Developed RUE and RLE numbness and weakness. No vision changes, slurred speech or confusion. Symptoms mostly resolved by the time she arrived in ED.  Overall, it was felt her symptoms were consistent with TIA.  CTH-calcified meningioma right frontal lobe 7.5 mm in diameter similar to previous study. No hemorrhage.  CTA head and neck-no stenosis  MRI brain not done due to VNS  Echo-neg bubble study w nml EF 66%  A1c 10.2  Dc'd on lopresser 25 bid, imdur 60, plavix 75, asa 81, Crestor 20, trileptal 600 bid    Seizure Disorder  Medication: bid w  qid  Compliance:does not miss doses  Side effects:None  Last seizure:2022  Aura:  Triggers:  Description:right facial, eye, tongue drawing. Bites her tongue. No loss of  bladder or bowel. Usually happens at night.    Last lab 9/2022 Na was nml. Potassium slightly low.    PH  VNS turned off    JOSE  Not using CPAP. Referral placed to Sleep med in April but pt did not answer the phone. She is having sob and palpitations.      PMH: CAD with multiple stents, T2DM, HLD, HTN, Sx disorder with VNS, chronic back pain  Subjective      Past Medical History:   Past Medical History:   Diagnosis Date   • Arthritis    • CAD (coronary artery disease)    • Chest tightness or pressure    • Complex partial seizure (HCC)    • Diabetes mellitus (HCC)    • Dyslipidemia    • FHx: migraine headaches    • GERD (gastroesophageal reflux disease)    • Hiatal hernia    • Hypercholesterolemia    • Hypertension    • Myocardial infarction (HCC)    • JOSE on CPAP    • PONV (postoperative nausea and vomiting)    • Stroke syndrome    • Stroke syndrome        Past Surgical History:   Past Surgical History:   Procedure Laterality Date   • CARDIAC CATHETERIZATION      11 stents   • CARDIAC CATHETERIZATION Left 9/1/2022    Procedure: Coronary angiography;  Surgeon: Jose G Wills IV, MD;  Location: [facility] INVASIVE LOCATION;  Service: Cardiovascular;  Laterality: Left;   • CORONARY ANGIOPLASTY WITH STENT PLACEMENT     • CORONARY STENT PLACEMENT      x5   • IMPLANTATION VAGAL NERVE STIMULATOR     • LUMBAR FUSION  07/21/2014    decompression and fusion L4/5 Dr. Palm       Family History:   Family History   Problem Relation Age of Onset   • Heart disease Other    • Diabetes Other    • Cancer Other    • Stroke Other    • Coronary artery disease Mother    • Heart attack Mother    • Coronary artery disease Father        Social History:   Social History     Socioeconomic History   • Marital status:    Tobacco Use   • Smoking status: Former   • Smokeless tobacco: Never   Vaping Use   • Vaping Use: Never used   Substance and Sexual Activity   • Alcohol use: No   • Drug use: Defer   • Sexual activity: Defer        Medications:     Current Outpatient Medications:   •  amLODIPine (NORVASC) 2.5 MG tablet, Take 1 tablet by mouth Daily., Disp: 90 tablet, Rfl: 1  •  aspirin 81 MG tablet, Take 1 tablet by mouth Daily., Disp: 30 tablet, Rfl: 11  •  Dexilant 60 MG capsule, Take 60 mg by mouth Daily., Disp: , Rfl:   •  DULoxetine (CYMBALTA) 30 MG capsule, Take 30 mg by mouth Daily., Disp: , Rfl:   •  empagliflozin (Jardiance) 25 MG tablet tablet, Take 1 tablet by mouth Daily., Disp: 30 tablet, Rfl: 11  •  Evolocumab with Infusor (REPATHA) solution cartridge, Inject 3.5 mL under the skin into the appropriate area as directed Every 30 (Thirty) Days., Disp: 3.5 mL, Rfl: 11  •  famotidine (PEPCID) 20 MG tablet, Take 20 mg by mouth Every Night., Disp: , Rfl:   •  gabapentin (NEURONTIN) 600 MG tablet, 4 (Four) Times a Day., Disp: , Rfl:   •  HumuLIN R U-500 KwikPen 500 UNIT/ML solution pen-injector CONCENTRATED injection, 15 Units 3 (Three) Times a Day Before Meals., Disp: , Rfl:   •  hydroCHLOROthiazide (HYDRODIURIL) 25 MG tablet, Take 25 mg by mouth Daily., Disp: , Rfl:   •  HYDROcodone-acetaminophen (NORCO) 7.5-325 MG per tablet, Take 1 tablet by mouth Every 6 (Six) Hours As Needed for Moderate Pain (4-6)., Disp: , Rfl:   •  isosorbide mononitrate (IMDUR) 30 MG 24 hr tablet, Take 1 tablet by mouth Every Morning., Disp: 30 tablet, Rfl: 11  •  Lantus SoloStar 100 UNIT/ML injection pen, Inject 50 Units under the skin into the appropriate area as directed 2 (Two) Times a Day., Disp: , Rfl:   •  metoprolol tartrate (LOPRESSOR) 25 MG tablet, TAKE 1 TABLET BY MOUTH TWICE DAILY, Disp: 60 tablet, Rfl: 11  •  nitroglycerin (Nitrostat) 0.4 MG SL tablet, Place 1 tablet under the tongue Every 5 (Five) Minutes As Needed for Chest Pain. prn, Disp: 25 tablet, Rfl: 1  •  OXcarbazepine (TRILEPTAL) 600 MG tablet, Take 1 tablet by mouth 2 (Two) Times a Day. Please contact Neurology office to schedule a follow up appointment. Thanks!, Disp: 180 tablet,  Rfl: 0  •  Ozempic, 0.25 or 0.5 MG/DOSE, 2 MG/1.5ML solution pen-injector, Inject  under the skin into the appropriate area as directed 1 (One) Time Per Week., Disp: , Rfl:   •  pantoprazole (PROTONIX) 40 MG EC tablet, Daily., Disp: , Rfl:   •  promethazine (PHENERGAN) 25 MG tablet, Take 25 mg by mouth Every 6 (Six) Hours As Needed for Nausea or Vomiting., Disp: , Rfl:   •  ranolazine (Ranexa) 500 MG 12 hr tablet, Take 1 tablet by mouth 2 (Two) Times a Day., Disp: 60 tablet, Rfl: 6  •  traZODone (DESYREL) 150 MG tablet, TAKE ONE TABLET BY MOUTH EVERY NIGHT, Disp: 30 tablet, Rfl: 4    Allergies:   Allergies   Allergen Reactions   • Sulfa Antibiotics GI Intolerance       PHQ-9 Total Score:     STEADI Fall Risk Assessment has not been completed.    Objective     Physical Exam:   Physical Exam  Eyes:      Pupils: Pupils are equal, round, and reactive to light.   Neurological:      Mental Status: She is oriented to person, place, and time.      Coordination: Heel to Shin Test abnormal.      Gait: Tandem walk abnormal.      Deep Tendon Reflexes:      Reflex Scores:       Tricep reflexes are 1+ on the right side and 1+ on the left side.       Bicep reflexes are 1+ on the right side and 1+ on the left side.       Brachioradialis reflexes are 1+ on the right side and 1+ on the left side.       Patellar reflexes are 1+ on the right side and 1+ on the left side.       Achilles reflexes are 1+ on the right side and 1+ on the left side.  Psychiatric:         Speech: Speech normal.         Neurologic Exam     Mental Status   Oriented to person, place, and time.   Follows 3 step commands.   Attention: normal. Concentration: normal.   Speech: speech is normal   Level of consciousness: alert  Knowledge: consistent with education.   Normal comprehension.     Cranial Nerves     CN III, IV, VI   Pupils are equal, round, and reactive to light.  Right pupil: Accommodation: intact.   Left pupil: Accommodation: intact.   CN III: no CN III  "palsy  CN VI: no CN VI palsy  Nystagmus: none   Diplopia: none  Upgaze: normal  Downgaze: normal  Conjugate gaze: present    CN VII   Facial expression full, symmetric.     CN VIII   Hearing: intact    CN XII   CN XII normal.     Motor Exam   Muscle bulk: normal  Overall muscle tone: normal    Strength   Right neck flexion: 4/5  Left neck flexion: 4/5  Right neck extension: 4/5  Left neck extension: 4/5  Right deltoid: 4/5  Left deltoid: 4/5  Right biceps: 4/5  Left biceps: 4/5  Right triceps: 4/5  Left triceps: 4/5  Right wrist flexion: 4/5  Left wrist flexion: 4/5  Right wrist extension: 4/5  Left wrist extension: 4/5  Right interossei: 4/5  Left interossei: 4/5  Right abdominals: 4/5  Left abdominals: 4/5  Right iliopsoas: 4/5  Left iliopsoas: 4/5  Right quadriceps: 4/5  Left quadriceps: 4/5  Right hamstrin/5  Left hamstrin/5  Right glutei: 4/5  Left glutei: 4/5  Right anterior tibial: 4/5  Left anterior tibial: 4/5  Right posterior tibial: 4/5  Left posterior tibial: 4/5  Right peroneal: 4/5  Left peroneal: 4/5  Right gastroc: 4/5  Left gastroc: 4/5    Sensory Exam   Light touch normal.     Gait, Coordination, and Reflexes     Coordination   Heel to shin coordination: abnormal  Tandem walking coordination: abnormal    Tremor   Resting tremor: absent  Action tremor: absent    Reflexes   Right brachioradialis: 1+  Left brachioradialis: 1+  Right biceps: 1+  Left biceps: 1+  Right triceps: 1+  Left triceps: 1+  Right patellar: 1+  Left patellar: 1+  Right achilles: 1+  Left achilles: 1+  Right : 1+  Left : 1+Valgus deformity of of bilat LE.         Vital Signs:   Vitals:    22 1350   BP: 110/76   Pulse: 73   Temp: 97.3 °F (36.3 °C)   SpO2: 95%   Weight: 94.6 kg (208 lb 9.6 oz)   Height: 170.2 cm (67.01\")     Body mass index is 32.66 kg/m².     Results:   Imaging:   Cardiac Catheterization/Vascular Study    Addendum Date: 2022    · Significant 1-vessel coronary artery disease involving a " "high-grade stenosis at the ostium of the first diagonal branch. This branch is \"jailed\" by 2 layers of stents in the LAD. · Mild in-stent stenosis of the mid LAD and widely patent RCA stents. · No left ventriculography performed. Recent nuclear stress test calculated LVEF 66%.         Assessment / Plan      Assessment/Plan:   Diagnoses and all orders for this visit:    1. Weakness of right foot (Primary)  Comments:  Eversion of ankle. May need AFO to assist with balance and prevent falls.  Orders:  -     Ambulatory Referral to Physical Therapy Evaluate and treat    2. JOSE (obstructive sleep apnea)  Comments:  New referral placed  Orders:  -     Ambulatory Referral to Sleep Medicine    3. History of TIA (transient ischemic attack)  Comments:  Cont asa and Repatha. Crestor stopped by Cardiology.     4. Status post VNS (vagus nerve stimulator) placement    5. Coronary artery disease involving native heart, unspecified vessel or lesion type, unspecified whether angina present  Comments:  Per cardiology. Go to ER for chest pain unrelived by rest.             Patient Education:     Reviewed medications, potential side effects and signs and symptoms to report. Discussed risk versus benefits of treatment plan with patient and/or family-including medications, labs and radiology that may be ordered. Addressed questions and concerns during visit. Patient and/or family verbalized understanding and agree with plan. Instructed to call the office with any questions and report to ER with any life-threatening symptoms.     Follow Up:   Return in about 6 months (around 5/2/2023).    During this visit the following were done:  Labs Reviewed []    Labs Ordered []    Radiology Reports Reviewed []    Radiology Ordered []    PCP Records Reviewed []    Referring Provider Records Reviewed []    ER Records Reviewed []    Hospital Records Reviewed []    History Obtained From Family []    Radiology Images Reviewed []    Other Reviewed []  "   Records Requested []      Javier Galan, DNP, APRN

## 2022-11-03 RX ORDER — OXCARBAZEPINE 600 MG/1
TABLET, FILM COATED ORAL
Qty: 180 TABLET | Refills: 0 | Status: SHIPPED | OUTPATIENT
Start: 2022-11-03 | End: 2023-02-01

## 2022-11-03 NOTE — TELEPHONE ENCOUNTER
Rx Refill Note  Requested Prescriptions     Pending Prescriptions Disp Refills   • OXcarbazepine (TRILEPTAL) 600 MG tablet [Pharmacy Med Name: OXCARBAZEPIN 600MG] 180 tablet 0     Sig: TAKE 1 TABLET BY MOUTH TWICE DAILY NEED APPOINTMENT      Last filled: 8/8/22 90 day with 0 refills sent this in.   Last office visit with prescribing clinician: yesterday   Next office visit with prescribing clinician: 5/3/2023    Josey Miller MA  11/03/22, 13:43 EDT

## 2022-12-05 ENCOUNTER — TELEPHONE (OUTPATIENT)
Dept: PHYSICAL THERAPY | Facility: CLINIC | Age: 57
End: 2022-12-05

## 2022-12-30 ENCOUNTER — TELEPHONE (OUTPATIENT)
Dept: NEUROLOGY | Facility: CLINIC | Age: 57
End: 2022-12-30

## 2022-12-30 DIAGNOSIS — R29.898 WEAKNESS OF RIGHT FOOT: Primary | ICD-10-CM

## 2022-12-30 NOTE — TELEPHONE ENCOUNTER
PATIENT CALLING TO ADVISE REFERRAL FOR PHYSICAL THERAPY 22 HAS  - AN UPDATED REFERRAL NEEDS TO BE SENT BEFORE HER APPT ON 1/3/23    THANK YOU

## 2023-02-01 RX ORDER — OXCARBAZEPINE 600 MG/1
TABLET, FILM COATED ORAL
Qty: 180 TABLET | Refills: 0 | Status: SHIPPED | OUTPATIENT
Start: 2023-02-01

## 2023-02-01 NOTE — TELEPHONE ENCOUNTER
Rx Refill Note  Requested Prescriptions     Pending Prescriptions Disp Refills   • OXcarbazepine (TRILEPTAL) 600 MG tablet [Pharmacy Med Name: OXCARBAZEPIN 600MG] 180 tablet 0     Sig: TAKE 1 TABLET BY MOUTH TWICE DAILY NEED APPOINTMENT      Last filled: 11/03/2022 180 with 0 refills.  Last office visit with prescribing clinician: 11/02/2022      Next office visit with prescribing clinician: 05/03/2023    180 with 0 refills.    Ally Mcghee MA  02/01/23, 14:03 EST

## 2023-02-20 ENCOUNTER — OFFICE VISIT (OUTPATIENT)
Dept: CARDIOLOGY | Facility: CLINIC | Age: 58
End: 2023-02-20
Payer: MEDICAID

## 2023-02-20 VITALS
BODY MASS INDEX: 32.87 KG/M2 | OXYGEN SATURATION: 97 % | HEART RATE: 65 BPM | WEIGHT: 209.4 LBS | DIASTOLIC BLOOD PRESSURE: 76 MMHG | SYSTOLIC BLOOD PRESSURE: 117 MMHG | HEIGHT: 67 IN

## 2023-02-20 DIAGNOSIS — I25.119 CORONARY ARTERY DISEASE INVOLVING NATIVE CORONARY ARTERY OF NATIVE HEART WITH ANGINA PECTORIS: Primary | ICD-10-CM

## 2023-02-20 DIAGNOSIS — I10 ESSENTIAL HYPERTENSION: ICD-10-CM

## 2023-02-20 DIAGNOSIS — R06.02 SOB (SHORTNESS OF BREATH): ICD-10-CM

## 2023-02-20 PROCEDURE — 99213 OFFICE O/P EST LOW 20 MIN: CPT | Performed by: PHYSICIAN ASSISTANT

## 2023-02-20 RX ORDER — DIAZEPAM 10 MG/1
TABLET ORAL AS NEEDED
COMMUNITY
Start: 2023-02-09

## 2023-02-20 RX ORDER — HYDROXYZINE PAMOATE 25 MG/1
CAPSULE ORAL 2 TIMES DAILY
COMMUNITY
Start: 2023-02-06

## 2023-02-20 NOTE — PROGRESS NOTES
Problem list     Subjective   Phyllis Iyer is a 57 y.o. female     Chief Complaint   Patient presents with   • Coronary Artery Disease     3 month f/u   • Chest Pain     [facility] f/u, stress,  passed 2 months ago   • Shortness of Breath   Problem list:     1. CAD  1.1 cardiac catheterization in 2011 with stenting to the mid LAD as well as proximal, mid, and distal RCA with medical management recommended  1.2 left heart catheterization January 2016 with stenting of the right coronary artery with nonobstructive disease otherwise  1.3 stress test November 2016 but no evidence ischemia and preserved LV function  1.4 cardiac catheterization 5/4/2018 by Dr. Malik because of non-ST elevation myocardial infarction demonstrating high-grade RCA disease status post stenting ×2.  60% IntraStent restenosis of the LAD with medical management recommended.  Normal LV function noted  1.5 cardiac catheterization July 2018 with stenting of the LAD because of refractory angina.  30 to 50% of the circumflex and RCA with medical management recommended  1.6 cardiac catheterization June 2020 because of unstable angina with stenting x1 of the RCA for subtotal stenosis.  Residual disease of the LAD 50% with an 80 to 90% diagonal.  Nonobstructive disease otherwise with medical management recommended  1.7 stress test August 2022 with scintigraphy demonstrating a large relatively fixed inferior and inferolateral perfusion defect with normal wall motion most compatible with attenuation artifact.  There is a small reversible defect confined to the apex compatible with ischemia.  Preserved LV function noted  1.8 cardiac catheterization September 2022 demonstrates nonobstructive coronaries with 30 to 40% mid LAD disease in the previous placed stent, very minimal disease in the RCA and 95% diagonal disease which was not amenable for intervention because of previous stenting.  Medical management recommended  2.  Preserved systolic function  3.   Hypertension  4.  Dyslipidemia  5.  Diabetes mellitus  6.  Obstructive sleep apnea noncompliant with CPAP therapy    HPI    Patient is a 57-year-old female who presents to the office to be evaluated.  Please see the patient routinely because of coronary disease.  Her most recent catheterization in September 2022 with 30 to 40% mid LAD disease and a 95% diagonal disease.  It was not felt to be amenable for intervention because of jailed stents previously placed in the LAD.    She has done well.  Unfortunately, she recently lost her  and is tearful on examination.  She has felt a tightness in her chest but otherwise has been stable.  Her dyspnea is mild when exerting or doing activity.  She does not complain of PND orthopnea.    Patient does not complain of palpitating nor does patient complain of dysrhythmic symptoms.  She is stable otherwise.    Current Outpatient Medications on File Prior to Visit   Medication Sig Dispense Refill   • amLODIPine (NORVASC) 2.5 MG tablet Take 1 tablet by mouth Daily. 90 tablet 1   • aspirin 81 MG tablet Take 1 tablet by mouth Daily. 30 tablet 11   • Dexilant 60 MG capsule Take 60 mg by mouth Daily.     • diazePAM (VALIUM) 10 MG tablet As Needed.     • DULoxetine (CYMBALTA) 30 MG capsule Take 30 mg by mouth Daily.     • empagliflozin (Jardiance) 25 MG tablet tablet Take 1 tablet by mouth Daily. 30 tablet 11   • Evolocumab with Infusor (REPATHA) solution cartridge Inject 3.5 mL under the skin into the appropriate area as directed Every 30 (Thirty) Days. 3.5 mL 11   • famotidine (PEPCID) 20 MG tablet Take 20 mg by mouth Every Night.     • gabapentin (NEURONTIN) 600 MG tablet 4 (Four) Times a Day.     • HumuLIN R U-500 KwikPen 500 UNIT/ML solution pen-injector CONCENTRATED injection 15 Units 3 (Three) Times a Day Before Meals.     • hydroCHLOROthiazide (HYDRODIURIL) 25 MG tablet Take 25 mg by mouth Daily.     • HYDROcodone-acetaminophen (NORCO) 7.5-325 MG per tablet Take 1 tablet  by mouth Every 6 (Six) Hours As Needed for Moderate Pain (4-6).     • hydrOXYzine pamoate (VISTARIL) 25 MG capsule 2 (Two) Times a Day.     • isosorbide mononitrate (IMDUR) 30 MG 24 hr tablet Take 1 tablet by mouth Every Morning. 30 tablet 11   • Lantus SoloStar 100 UNIT/ML injection pen Inject 50 Units under the skin into the appropriate area as directed 2 (Two) Times a Day.     • metoprolol tartrate (LOPRESSOR) 25 MG tablet TAKE 1 TABLET BY MOUTH TWICE DAILY 60 tablet 11   • nitroglycerin (Nitrostat) 0.4 MG SL tablet Place 1 tablet under the tongue Every 5 (Five) Minutes As Needed for Chest Pain. prn 25 tablet 1   • OXcarbazepine (TRILEPTAL) 600 MG tablet TAKE 1 TABLET BY MOUTH TWICE DAILY NEED APPOINTMENT 180 tablet 0   • Ozempic, 0.25 or 0.5 MG/DOSE, 2 MG/1.5ML solution pen-injector Inject  under the skin into the appropriate area as directed 1 (One) Time Per Week.     • pantoprazole (PROTONIX) 40 MG EC tablet Daily.     • promethazine (PHENERGAN) 25 MG tablet Take 25 mg by mouth Every 6 (Six) Hours As Needed for Nausea or Vomiting.     • ranolazine (Ranexa) 500 MG 12 hr tablet Take 1 tablet by mouth 2 (Two) Times a Day. 60 tablet 6   • traZODone (DESYREL) 150 MG tablet TAKE ONE TABLET BY MOUTH EVERY NIGHT 30 tablet 4     No current facility-administered medications on file prior to visit.       Sulfa antibiotics    Past Medical History:   Diagnosis Date   • Arthritis    • CAD (coronary artery disease)    • Chest tightness or pressure    • Complex partial seizure (HCC)    • Diabetes mellitus (HCC)    • Dyslipidemia    • FHx: migraine headaches    • GERD (gastroesophageal reflux disease)    • Hiatal hernia    • Hypercholesterolemia    • Hypertension    • Myocardial infarction (HCC)    • JOSE on CPAP    • PONV (postoperative nausea and vomiting)    • Stroke syndrome    • Stroke syndrome        Social History     Socioeconomic History   • Marital status:    Tobacco Use   • Smoking status: Former   • Smokeless  "tobacco: Never   Vaping Use   • Vaping Use: Never used   Substance and Sexual Activity   • Alcohol use: No   • Drug use: Defer   • Sexual activity: Defer       Family History   Problem Relation Age of Onset   • Heart disease Other    • Diabetes Other    • Cancer Other    • Stroke Other    • Coronary artery disease Mother    • Heart attack Mother    • Coronary artery disease Father        Review of Systems   Constitutional: Positive for fatigue ( passed 2 months ago). Negative for chills, diaphoresis and fever.   Eyes: Negative.    Respiratory: Positive for chest tightness and shortness of breath. Negative for apnea, cough and wheezing.    Cardiovascular: Positive for chest pain (stress,  passed 2 months ago). Negative for palpitations and leg swelling.   Gastrointestinal: Negative.  Negative for blood in stool.   Endocrine: Negative.    Genitourinary: Negative.  Negative for hematuria.   Musculoskeletal: Positive for arthralgias and back pain. Negative for myalgias and neck pain.   Skin: Negative.    Allergic/Immunologic: Negative.    Neurological: Positive for dizziness (occas) and headaches. Negative for syncope, weakness, light-headedness and numbness.   Hematological: Negative.  Does not bruise/bleed easily.   Psychiatric/Behavioral: Positive for sleep disturbance ( passed 2 months ago).       Objective   Vitals:    02/20/23 1546   BP: 117/76   BP Location: Left arm   Patient Position: Sitting   Pulse: 65   SpO2: 97%   Weight: 95 kg (209 lb 6.4 oz)   Height: 170.2 cm (67.01\")      /76 (BP Location: Left arm, Patient Position: Sitting)   Pulse 65   Ht 170.2 cm (67.01\")   Wt 95 kg (209 lb 6.4 oz)   SpO2 97%   BMI 32.79 kg/m²     Lab Results (most recent)     None          Physical Exam  Vitals and nursing note reviewed.   Constitutional:       General: She is not in acute distress.     Appearance: Normal appearance. She is well-developed.   HENT:      Head: Normocephalic and " atraumatic.   Eyes:      General: No scleral icterus.        Right eye: No discharge.         Left eye: No discharge.      Conjunctiva/sclera: Conjunctivae normal.   Neck:      Vascular: No carotid bruit.   Cardiovascular:      Rate and Rhythm: Normal rate and regular rhythm.      Heart sounds: Normal heart sounds. No murmur heard.    No friction rub. No gallop.   Pulmonary:      Effort: Pulmonary effort is normal. No respiratory distress.      Breath sounds: Normal breath sounds. No wheezing or rales.   Chest:      Chest wall: No tenderness.   Musculoskeletal:      Right lower leg: No edema.      Left lower leg: No edema.   Skin:     General: Skin is warm and dry.      Coloration: Skin is not pale.      Findings: No erythema or rash.   Neurological:      Mental Status: She is alert and oriented to person, place, and time.      Cranial Nerves: No cranial nerve deficit.   Psychiatric:         Behavior: Behavior normal.         Procedure   Procedures       Assessment & Plan     Problems Addressed this Visit        Cardiac and Vasculature    Coronary artery disease involving native coronary artery of native heart with angina pectoris (HCC) - Primary    Essential hypertension       Pulmonary and Pneumonias    SOB (shortness of breath)   Diagnoses       Codes Comments    Coronary artery disease involving native coronary artery of native heart with angina pectoris (HCC)    -  Primary ICD-10-CM: I25.119  ICD-9-CM: 414.01, 413.9     SOB (shortness of breath)     ICD-10-CM: R06.02  ICD-9-CM: 786.05     Essential hypertension     ICD-10-CM: I10  ICD-9-CM: 401.9         Recommendation  1.  Patient is a 57-year-old female with history of coronary disease with occasional chest discomfort.  Some of her chest discomfort may be from anxiety with recent loss of her  as she describes it as a tightness.  She has diagonal disease and we can adjust medication further in regards to antianginal therapy.  For now, she feels she is  doing well and we can monitor.  If symptoms were to progress or change in any way, I want her to call the office.    2.  Dyspnea is mild.  She has good LV function.  We will continue to monitor her dyspnea and symptoms overall.  She appears stable.    3.  Her blood pressure currently stable on current medical regimen.  I will continue the same.  We will see her back for follow-up in 6 months or sooner as symptoms discussed.  Follow-up with primary as scheduled.             Phyllis Iyer  reports that she has quit smoking. She has never used smokeless tobacco..         Electronically signed by:

## 2023-03-03 DIAGNOSIS — I25.119 CORONARY ARTERY DISEASE INVOLVING NATIVE CORONARY ARTERY OF NATIVE HEART WITH ANGINA PECTORIS: ICD-10-CM

## 2023-03-03 DIAGNOSIS — I10 ESSENTIAL HYPERTENSION: ICD-10-CM

## 2023-03-03 RX ORDER — AMLODIPINE BESYLATE 2.5 MG/1
TABLET ORAL
Qty: 90 TABLET | Refills: 0 | OUTPATIENT
Start: 2023-03-03

## 2023-04-03 RX ORDER — RANOLAZINE 500 MG/1
500 TABLET, EXTENDED RELEASE ORAL 2 TIMES DAILY
Qty: 60 TABLET | Refills: 5 | Status: SHIPPED | OUTPATIENT
Start: 2023-04-03

## 2023-04-03 RX ORDER — EVOLOCUMAB 420 MG/3.5
KIT SUBCUTANEOUS
Qty: 3.5 ML | Refills: 10 | Status: SHIPPED | OUTPATIENT
Start: 2023-04-03

## 2023-05-04 RX ORDER — OXCARBAZEPINE 600 MG/1
TABLET, FILM COATED ORAL
Qty: 180 TABLET | Refills: 0 | Status: SHIPPED | OUTPATIENT
Start: 2023-05-04

## 2023-05-04 NOTE — TELEPHONE ENCOUNTER
Rx Refill Note  Requested Prescriptions     Pending Prescriptions Disp Refills   • OXcarbazepine (TRILEPTAL) 600 MG tablet [Pharmacy Med Name: OXCARBAZEPIN 600MG] 180 tablet 0     Sig: TAKE 1 TABLET BY MOUTH TWICE DAILY NEED APPOINTMENT FOR REFILLS      Last filled: 02/01/2023, 180 with 0 Refills.  Last office visit with prescribing clinician: 7/30/2021      Next office visit with prescribing clinician: 08/28/2023 ([place])    Lyndsay Hayes MA  05/04/23, 09:06 EDT

## 2023-06-01 DIAGNOSIS — R00.2 PALPITATIONS: ICD-10-CM

## 2023-06-01 DIAGNOSIS — I10 ESSENTIAL HYPERTENSION: ICD-10-CM

## 2023-08-01 RX ORDER — OXCARBAZEPINE 600 MG/1
TABLET, FILM COATED ORAL
Qty: 180 TABLET | Refills: 0 | Status: SHIPPED | OUTPATIENT
Start: 2023-08-01

## 2023-08-01 RX ORDER — EMPAGLIFLOZIN 25 MG/1
TABLET, FILM COATED ORAL
Qty: 30 TABLET | Refills: 10 | Status: SHIPPED | OUTPATIENT
Start: 2023-08-01

## 2023-08-11 ENCOUNTER — TELEPHONE (OUTPATIENT)
Dept: NEUROLOGY | Facility: CLINIC | Age: 58
End: 2023-08-11
Payer: MEDICAID

## 2023-08-11 NOTE — TELEPHONE ENCOUNTER
Provider: VICENTE  Caller: JOHN  Phone Number: [phone]   Reason for Call: PT CALLED AND STATES THAT SHE HAS A VNS AND PROVIDER TURNED IT OFF DUE TO IT MAKING PT'S BLOOD PRESSURE RISE. PT STATES THAT SHE HAS TORE LIGAMENTS IN HER FOOT AND PT'S FOOT DOCTORS IS WANTING TO KNOW SINCE THE VNS IS TURNED OFF IS IT OKAY TO HAVE A MRI COMPLETED ON FOOT?    PLEASE REVIEW AND ADVISE.  THANK YOU

## 2023-08-11 NOTE — TELEPHONE ENCOUNTER
Spoke to Phyllis.  She would like a letter written and faxed to Dr Chad Hendricks at [facility].  She will  Monday 08/14/2023.

## 2023-08-28 ENCOUNTER — OFFICE VISIT (OUTPATIENT)
Dept: NEUROLOGY | Facility: CLINIC | Age: 58
End: 2023-08-28
Payer: MEDICAID

## 2023-08-28 VITALS
OXYGEN SATURATION: 98 % | HEIGHT: 67 IN | HEART RATE: 69 BPM | BODY MASS INDEX: 29.57 KG/M2 | DIASTOLIC BLOOD PRESSURE: 78 MMHG | SYSTOLIC BLOOD PRESSURE: 114 MMHG | WEIGHT: 188.4 LBS

## 2023-08-28 DIAGNOSIS — R29.898 WEAKNESS OF RIGHT FOOT: ICD-10-CM

## 2023-08-28 DIAGNOSIS — Z86.73 HISTORY OF TIA (TRANSIENT ISCHEMIC ATTACK): ICD-10-CM

## 2023-08-28 DIAGNOSIS — G40.019 PARTIAL IDIOPATHIC EPILEPSY WITH SEIZURES OF LOCALIZED ONSET, INTRACTABLE, WITHOUT STATUS EPILEPTICUS: Primary | ICD-10-CM

## 2023-08-28 DIAGNOSIS — Z96.89 STATUS POST VNS (VAGUS NERVE STIMULATOR) PLACEMENT: ICD-10-CM

## 2023-08-28 DIAGNOSIS — G47.33 OSA (OBSTRUCTIVE SLEEP APNEA): ICD-10-CM

## 2023-08-28 DIAGNOSIS — F33.0 MILD RECURRENT MAJOR DEPRESSION: ICD-10-CM

## 2023-08-28 PROCEDURE — 3074F SYST BP LT 130 MM HG: CPT | Performed by: NURSE PRACTITIONER

## 2023-08-28 PROCEDURE — 3078F DIAST BP <80 MM HG: CPT | Performed by: NURSE PRACTITIONER

## 2023-08-28 PROCEDURE — 1160F RVW MEDS BY RX/DR IN RCRD: CPT | Performed by: NURSE PRACTITIONER

## 2023-08-28 PROCEDURE — 99214 OFFICE O/P EST MOD 30 MIN: CPT | Performed by: NURSE PRACTITIONER

## 2023-08-28 PROCEDURE — 1159F MED LIST DOCD IN RCRD: CPT | Performed by: NURSE PRACTITIONER

## 2023-08-28 RX ORDER — OXCARBAZEPINE 600 MG/1
600 TABLET, FILM COATED ORAL 2 TIMES DAILY
Qty: 180 TABLET | Refills: 3 | Status: SHIPPED | OUTPATIENT
Start: 2023-08-28

## 2023-08-28 RX ORDER — PEN NEEDLE, DIABETIC 32GX 5/32"
NEEDLE, DISPOSABLE MISCELLANEOUS
COMMUNITY
Start: 2023-08-09

## 2023-08-28 RX ORDER — INSULIN LISPRO 100 [IU]/ML
INJECTION, SOLUTION INTRAVENOUS; SUBCUTANEOUS
COMMUNITY
Start: 2023-08-03

## 2023-08-28 RX ORDER — CITALOPRAM 20 MG/1
TABLET ORAL
COMMUNITY
Start: 2023-08-02

## 2023-08-28 NOTE — LETTER
2023     Jack Early MD  [address] [place] [id number]    Patient: Phyllis Iyer   YOB: 1965   Date of Visit: 2023     Dear Jack Early MD:       Thank you for referring Phyllis Iyer to me for evaluation. Below are the relevant portions of my assessment and plan of care.    If you have questions, please do not hesitate to call me. I look forward to following Phyllis along with you.         Sincerely,        Javier Galan DNP, APRN        CC: No Recipients    Javier Galan DNP, APRN  [phone]  Signed     Neuro Office Visit      Encounter Date: 2023   Patient Name: Phyllis Iyer  : 1965   MRN: [id number]   PCP: Dr Early  Chief Complaint:    Chief Complaint   Patient presents with    Weakness of right foot     F/U       History of Present Illness: Phyllis Iyer is a 58 y.o. female who is here today in Neurology for  history of TI, sz disorder,JOSE    Last visit  2023 w me-refer to PT, sleep med, cont asa, and repatha    History of TIA/CVA  She did not get AFO for right foot and sustained Right foot fracture. Now in walking boot. Trying to avoid surgery.  Right hand and right foot is numb and weak. Right foot will dany when walking. She can trip due to foot weakness.  Seen by cardiology and placed on Repatha. Abnml GXT  [facility] 22 w Dr Wills. Significant 1 vessel disease w high grade stenosis first diagonal of LAD jailed by 2 stents. Mild in-stent stenosis of mid LAD and patent RCA. EF 66%-medical management adding amlodipine.    PH  3/30/2022 admitted at [facility] for TIA sx of right sided weakness and numbness.  Describes walking down the [place] and her right leg gave out. Developed RUE and RLE numbness and weakness. No vision changes, slurred speech or confusion. Symptoms mostly resolved by the time she arrived in ED.  Overall, it was felt her symptoms were consistent with TIA.  CTH-calcified  meningioma right frontal lobe 7.5 mm in diameter similar to previous study. No hemorrhage.  CTA head and neck-no stenosis  MRI brain not done due to VNS  Echo-neg bubble study w nml EF 66%  A1c 10.2  Dc'd on lopresser 25 bid, imdur 60, plavix 75, asa 81, Crestor 20, trileptal 600 bid     Seizure Disorderw VNS  VNS turned off  Medication: bid w  qid  Compliance:does not miss doses  Side effects:None  Last seizure:5/2023. She's not sure she had a sz but when she woke up her face was numb.  Description:right facial, eye, tongue drawing. Bites her tongue. No loss of bladder or bowel. Usually happens at night.    JOSE  Referred to sleep med. She did not make appt and does not want to go at this time.    Depression  Recently lost her  tragically to arm sepsis. Denies SI.    PMH: CAD with multiple stents, T2DM, HLD, HTN, Sx disorder with VNS, chronic back pain   Subjective      Past Medical History:   Past Medical History:   Diagnosis Date    Arthritis     CAD (coronary artery disease)     Chest tightness or pressure     Complex partial seizure     Diabetes mellitus     Dyslipidemia     FHx: migraine headaches     GERD (gastroesophageal reflux disease)     Hiatal hernia     Hypercholesterolemia     Hypertension     Myocardial infarction     JOSE on CPAP     PONV (postoperative nausea and vomiting)     Stroke syndrome     Stroke syndrome        Past Surgical History:   Past Surgical History:   Procedure Laterality Date    CARDIAC CATHETERIZATION      11 stents    CARDIAC CATHETERIZATION Left 9/1/2022    Procedure: Coronary angiography;  Surgeon: Jose G Wills IV, MD;  Location: [facility] CATH INVASIVE LOCATION;  Service: Cardiovascular;  Laterality: Left;    CORONARY ANGIOPLASTY WITH STENT PLACEMENT      CORONARY STENT PLACEMENT      x5    IMPLANTATION VAGAL NERVE STIMULATOR      LUMBAR FUSION  07/21/2014    decompression and fusion L4/5 Dr. Palm       Family History:    Family History   Problem Relation Age of Onset    Heart disease Other     Diabetes Other     Cancer Other     Stroke Other     Coronary artery disease Mother     Heart attack Mother     Coronary artery disease Father        Social History:   Social History     Socioeconomic History    Marital status:    Tobacco Use    Smoking status: Former    Smokeless tobacco: Never   Vaping Use    Vaping Use: Never used   Substance and Sexual Activity    Alcohol use: No    Drug use: Defer    Sexual activity: Defer       Medications:     Current Outpatient Medications:     amLODIPine (NORVASC) 2.5 MG tablet, Take 1 tablet by mouth Daily., Disp: 90 tablet, Rfl: 1    aspirin 81 MG tablet, Take 1 tablet by mouth Daily., Disp: 30 tablet, Rfl: 11    BD Pen Needle Sherrill 2nd Gen 32G X 4 MM misc, , Disp: , Rfl:     citalopram (CeleXA) 20 MG tablet, , Disp: , Rfl:     Dexilant 60 MG capsule, Take 1 capsule by mouth Daily., Disp: , Rfl:     diazePAM (VALIUM) 10 MG tablet, As Needed., Disp: , Rfl:     DULoxetine (CYMBALTA) 30 MG capsule, Take 1 capsule by mouth Daily., Disp: , Rfl:     famotidine (PEPCID) 20 MG tablet, Take 1 tablet by mouth Every Night., Disp: , Rfl:     gabapentin (NEURONTIN) 600 MG tablet, 4 (Four) Times a Day., Disp: , Rfl:     HumuLIN R U-500 KwikPen 500 UNIT/ML solution pen-injector CONCENTRATED injection, 15 Units 3 (Three) Times a Day Before Meals., Disp: , Rfl:     hydroCHLOROthiazide (HYDRODIURIL) 25 MG tablet, Take 1 tablet by mouth Daily., Disp: , Rfl:     HYDROcodone-acetaminophen (NORCO) 7.5-325 MG per tablet, Take 1 tablet by mouth Every 6 (Six) Hours As Needed for Moderate Pain., Disp: , Rfl:     hydrOXYzine pamoate (VISTARIL) 25 MG capsule, 2 (Two) Times a Day., Disp: , Rfl:     Insulin Lispro, 1 Unit Dial, (HUMALOG) 100 UNIT/ML solution pen-injector, , Disp: , Rfl:     isosorbide mononitrate (IMDUR) 30 MG 24 hr tablet, Take 1 tablet by mouth Every Morning., Disp: 30  tablet, Rfl: 11    Jardiance 25 MG tablet tablet, TAKE 1 TABLET BY MOUTH DAILY., Disp: 30 tablet, Rfl: 10    Lantus SoloStar 100 UNIT/ML injection pen, Inject 50 Units under the skin into the appropriate area as directed 2 (Two) Times a Day., Disp: , Rfl:     metoprolol tartrate (LOPRESSOR) 25 MG tablet, TAKE 1 TABLET BY MOUTH TWICE DAILY, Disp: 60 tablet, Rfl: 10    nitroglycerin (Nitrostat) 0.4 MG SL tablet, Place 1 tablet under the tongue Every 5 (Five) Minutes As Needed for Chest Pain. prn, Disp: 25 tablet, Rfl: 1    OXcarbazepine (TRILEPTAL) 600 MG tablet, Take 1 tablet by mouth 2 (Two) Times a Day., Disp: 180 tablet, Rfl: 3    Ozempic, 0.25 or 0.5 MG/DOSE, 2 MG/1.5ML solution pen-injector, Inject 1 mg under the skin into the appropriate area as directed 1 (One) Time Per Week., Disp: , Rfl:     pantoprazole (PROTONIX) 40 MG EC tablet, Daily., Disp: , Rfl:     promethazine (PHENERGAN) 25 MG tablet, Take 1 tablet by mouth Every 6 (Six) Hours As Needed for Nausea or Vomiting., Disp: , Rfl:     ranolazine (RANEXA) 500 MG 12 hr tablet, TAKE 1 TABLET BY MOUTH 2 (TWO) TIMES A DAY., Disp: 60 tablet, Rfl: 5    Repatha Pushtronex System solution cartridge, INJECT 3.5MLS UNDER THE SKIN ONCE MONTHLY AS DIRECTED, Disp: 3.5 mL, Rfl: 10    traZODone (DESYREL) 150 MG tablet, TAKE ONE TABLET BY MOUTH EVERY NIGHT, Disp: 30 tablet, Rfl: 4    Allergies:   Allergies   Allergen Reactions    Sulfa Antibiotics GI Intolerance       PHQ-9 Total Score:     STEADI Fall Risk Assessment has not been completed.    Objective     Physical Exam:   Physical Exam  Eyes:      Pupils: Pupils are equal, round, and reactive to light.   Neurological:      Mental Status: She is oriented to person, place, and time.      Coordination: Finger-Nose-Finger Test, Heel to Shin Test and Romberg Test normal.      Gait: Gait is intact.      Deep Tendon Reflexes:      Reflex Scores:       Tricep reflexes are 2+ on the right side and 2+ on the left  side.       Bicep reflexes are 2+ on the right side and 2+ on the left side.       Brachioradialis reflexes are 2+ on the right side and 2+ on the left side.       Patellar reflexes are 2+ on the right side and 2+ on the left side.       Achilles reflexes are 2+ on the right side and 2+ on the left side.  Psychiatric:         Speech: Speech normal.       Neurologic Exam     Mental Status   Oriented to person, place, and time.   Follows 3 step commands.   Attention: normal. Concentration: normal.   Speech: speech is normal   Level of consciousness: alert  Knowledge: consistent with education.   Normal comprehension.     Cranial Nerves     CN III, IV, VI   Pupils are equal, round, and reactive to light.  Right pupil: Accommodation: intact.   Left pupil: Accommodation: intact.   CN III: no CN III palsy  CN VI: no CN VI palsy  Nystagmus: none   Diplopia: none  Upgaze: normal  Downgaze: normal  Conjugate gaze: present    CN VII   Facial expression full, symmetric.     CN VIII   Hearing: intact    CN XII   CN XII normal.     Motor Exam   Muscle bulk: normal  Overall muscle tone: normal    Strength   Right biceps: 5/5  Left biceps: 5/5  Right triceps: 5/5  Left triceps: 5/5  Right interossei: 5/5  Left interossei: 5/5  Right quadriceps: 5/5  Left quadriceps: 5/5  Left anterior tibial: 5/5  Left posterior tibial: 5/5    Sensory Exam   Light touch normal.     Gait, Coordination, and Reflexes     Gait  Gait: normal    Coordination   Romberg: negative  Finger to nose coordination: normal  Heel to shin coordination: normal    Tremor   Resting tremor: absent  Action tremor: absent    Reflexes   Right brachioradialis: 2+  Left brachioradialis: 2+  Right biceps: 2+  Left biceps: 2+  Right triceps: 2+  Left triceps: 2+  Right patellar: 2+  Left patellar: 2+  Right achilles: 2+  Left achilles: 2+  Right : 2+  Left : 2+     Vital Signs:   Vitals:    08/28/23 1516   BP: 114/78   Pulse: 69   SpO2: 98%   Weight: 85.5 kg (188 lb  "6.4 oz)   Height: 170.2 cm (67.01\")     Body mass index is 29.5 kg/mý.         Assessment / Plan      Assessment/Plan:   Diagnoses and all orders for this visit:    1. Partial idiopathic epilepsy with seizures of localized onset, intractable, without status epilepticus (Primary)  Comments:  Cont OXC and GBP  Orders:  -     OXcarbazepine (TRILEPTAL) 600 MG tablet; Take 1 tablet by mouth 2 (Two) Times a Day.  Dispense: 180 tablet; Refill: 3    2. Weakness of right foot  Comments:  Per ortho. Use AFO after out of boot    3. JOSE (obstructive sleep apnea)  Comments:  She wants to wait on this for now.    4. History of TIA (transient ischemic attack)  Comments:  Cont asa and repatha    5. Status post VNS (vagus nerve stimulator) placement    6. Mild recurrent major depression  Comments:  She has a good support system. Continue to monitor.           Patient Education:       Reviewed medications, potential side effects and signs and symptoms to report. Discussed risk versus benefits of treatment plan with patient and/or family-including medications, labs and radiology that may be ordered. Addressed questions and concerns during visit. Patient and/or family verbalized understanding and agree with plan. Instructed to call the office with any questions and report to ER with any life-threatening symptoms.     Follow Up:   Return in about 6 months (around 2/28/2024), or video, for Recheck.    During this visit the following were done:  Labs Reviewed []    Labs Ordered []    Radiology Reports Reviewed []    Radiology Ordered []    PCP Records Reviewed []    Referring Provider Records Reviewed []    ER Records Reviewed []    Hospital Records Reviewed []    History Obtained From Family []    Radiology Images Reviewed []    Other Reviewed [x]    Records Requested []      Javier Galan, SOSA, APRN    "

## 2023-08-28 NOTE — PROGRESS NOTES
Neuro Office Visit      Encounter Date: 2023   Patient Name: Phyllis Iyer  : 1965   MRN: [id number]   PCP: Dr Early  Chief Complaint:    Chief Complaint   Patient presents with    Weakness of right foot     F/U       History of Present Illness: Phyllis Iyer is a 58 y.o. female who is here today in Neurology for  history of TI, sz disorder,JSOE    Last visit  2023 w me-refer to PT, sleep med, cont asa, and repatha    History of TIA/CVA  She did not get AFO for right foot and sustained Right foot fracture. Now in walking boot. Trying to avoid surgery.  Right hand and right foot is numb and weak. Right foot will dany when walking. She can trip due to foot weakness.  Seen by cardiology and placed on Repatha. Abnml GXT  [facility] 22 w Dr Wills. Significant 1 vessel disease w high grade stenosis first diagonal of LAD jailed by 2 stents. Mild in-stent stenosis of mid LAD and patent RCA. EF 66%-medical management adding amlodipine.    PH  3/30/2022 admitted at [facility] for TIA sx of right sided weakness and numbness.  Describes walking down the [place] and her right leg gave out. Developed RUE and RLE numbness and weakness. No vision changes, slurred speech or confusion. Symptoms mostly resolved by the time she arrived in ED.  Overall, it was felt her symptoms were consistent with TIA.  CTH-calcified meningioma right frontal lobe 7.5 mm in diameter similar to previous study. No hemorrhage.  CTA head and neck-no stenosis  MRI brain not done due to VNS  Echo-neg bubble study w nml EF 66%  A1c 10.2  Dc'd on lopresser 25 bid, imdur 60, plavix 75, asa 81, Crestor 20, trileptal 600 bid     Seizure Disorderw VNS  VNS turned off  Medication: bid w  qid  Compliance:does not miss doses  Side effects:None  Last seizure:2023. She's not sure she had a sz but when she woke up her face was numb.  Description:right facial, eye, tongue drawing. Bites her tongue. No loss of bladder  or bowel. Usually happens at night.    JOSE  Referred to sleep med. She did not make appt and does not want to go at this time.    Depression  Recently lost her  tragically to arm sepsis. Denies SI.    PMH: CAD with multiple stents, T2DM, HLD, HTN, Sx disorder with VNS, chronic back pain   Subjective      Past Medical History:   Past Medical History:   Diagnosis Date    Arthritis     CAD (coronary artery disease)     Chest tightness or pressure     Complex partial seizure     Diabetes mellitus     Dyslipidemia     FHx: migraine headaches     GERD (gastroesophageal reflux disease)     Hiatal hernia     Hypercholesterolemia     Hypertension     Myocardial infarction     JOSE on CPAP     PONV (postoperative nausea and vomiting)     Stroke syndrome     Stroke syndrome        Past Surgical History:   Past Surgical History:   Procedure Laterality Date    CARDIAC CATHETERIZATION      11 stents    CARDIAC CATHETERIZATION Left 9/1/2022    Procedure: Coronary angiography;  Surgeon: Jose G Wills IV, MD;  Location: [facility] INVASIVE LOCATION;  Service: Cardiovascular;  Laterality: Left;    CORONARY ANGIOPLASTY WITH STENT PLACEMENT      CORONARY STENT PLACEMENT      x5    IMPLANTATION VAGAL NERVE STIMULATOR      LUMBAR FUSION  07/21/2014    decompression and fusion L4/5 Dr. Palm       Family History:   Family History   Problem Relation Age of Onset    Heart disease Other     Diabetes Other     Cancer Other     Stroke Other     Coronary artery disease Mother     Heart attack Mother     Coronary artery disease Father        Social History:   Social History     Socioeconomic History    Marital status:    Tobacco Use    Smoking status: Former    Smokeless tobacco: Never   Vaping Use    Vaping Use: Never used   Substance and Sexual Activity    Alcohol use: No    Drug use: Defer    Sexual activity: Defer       Medications:     Current Outpatient Medications:     amLODIPine (NORVASC) 2.5 MG tablet, Take 1  tablet by mouth Daily., Disp: 90 tablet, Rfl: 1    aspirin 81 MG tablet, Take 1 tablet by mouth Daily., Disp: 30 tablet, Rfl: 11    BD Pen Needle Sherrill 2nd Gen 32G X 4 MM misc, , Disp: , Rfl:     citalopram (CeleXA) 20 MG tablet, , Disp: , Rfl:     Dexilant 60 MG capsule, Take 1 capsule by mouth Daily., Disp: , Rfl:     diazePAM (VALIUM) 10 MG tablet, As Needed., Disp: , Rfl:     DULoxetine (CYMBALTA) 30 MG capsule, Take 1 capsule by mouth Daily., Disp: , Rfl:     famotidine (PEPCID) 20 MG tablet, Take 1 tablet by mouth Every Night., Disp: , Rfl:     gabapentin (NEURONTIN) 600 MG tablet, 4 (Four) Times a Day., Disp: , Rfl:     HumuLIN R U-500 KwikPen 500 UNIT/ML solution pen-injector CONCENTRATED injection, 15 Units 3 (Three) Times a Day Before Meals., Disp: , Rfl:     hydroCHLOROthiazide (HYDRODIURIL) 25 MG tablet, Take 1 tablet by mouth Daily., Disp: , Rfl:     HYDROcodone-acetaminophen (NORCO) 7.5-325 MG per tablet, Take 1 tablet by mouth Every 6 (Six) Hours As Needed for Moderate Pain., Disp: , Rfl:     hydrOXYzine pamoate (VISTARIL) 25 MG capsule, 2 (Two) Times a Day., Disp: , Rfl:     Insulin Lispro, 1 Unit Dial, (HUMALOG) 100 UNIT/ML solution pen-injector, , Disp: , Rfl:     isosorbide mononitrate (IMDUR) 30 MG 24 hr tablet, Take 1 tablet by mouth Every Morning., Disp: 30 tablet, Rfl: 11    Jardiance 25 MG tablet tablet, TAKE 1 TABLET BY MOUTH DAILY., Disp: 30 tablet, Rfl: 10    Lantus SoloStar 100 UNIT/ML injection pen, Inject 50 Units under the skin into the appropriate area as directed 2 (Two) Times a Day., Disp: , Rfl:     metoprolol tartrate (LOPRESSOR) 25 MG tablet, TAKE 1 TABLET BY MOUTH TWICE DAILY, Disp: 60 tablet, Rfl: 10    nitroglycerin (Nitrostat) 0.4 MG SL tablet, Place 1 tablet under the tongue Every 5 (Five) Minutes As Needed for Chest Pain. prn, Disp: 25 tablet, Rfl: 1    OXcarbazepine (TRILEPTAL) 600 MG tablet, Take 1 tablet by mouth 2 (Two) Times a Day., Disp: 180 tablet, Rfl: 3    Ozempic,  0.25 or 0.5 MG/DOSE, 2 MG/1.5ML solution pen-injector, Inject 1 mg under the skin into the appropriate area as directed 1 (One) Time Per Week., Disp: , Rfl:     pantoprazole (PROTONIX) 40 MG EC tablet, Daily., Disp: , Rfl:     promethazine (PHENERGAN) 25 MG tablet, Take 1 tablet by mouth Every 6 (Six) Hours As Needed for Nausea or Vomiting., Disp: , Rfl:     ranolazine (RANEXA) 500 MG 12 hr tablet, TAKE 1 TABLET BY MOUTH 2 (TWO) TIMES A DAY., Disp: 60 tablet, Rfl: 5    Repatha Pushtronex System solution cartridge, INJECT 3.5MLS UNDER THE SKIN ONCE MONTHLY AS DIRECTED, Disp: 3.5 mL, Rfl: 10    traZODone (DESYREL) 150 MG tablet, TAKE ONE TABLET BY MOUTH EVERY NIGHT, Disp: 30 tablet, Rfl: 4    Allergies:   Allergies   Allergen Reactions    Sulfa Antibiotics GI Intolerance       PHQ-9 Total Score:     STEADI Fall Risk Assessment has not been completed.    Objective     Physical Exam:   Physical Exam  Eyes:      Pupils: Pupils are equal, round, and reactive to light.   Neurological:      Mental Status: She is oriented to person, place, and time.      Coordination: Finger-Nose-Finger Test, Heel to Shin Test and Romberg Test normal.      Gait: Gait is intact.      Deep Tendon Reflexes:      Reflex Scores:       Tricep reflexes are 2+ on the right side and 2+ on the left side.       Bicep reflexes are 2+ on the right side and 2+ on the left side.       Brachioradialis reflexes are 2+ on the right side and 2+ on the left side.       Patellar reflexes are 2+ on the right side and 2+ on the left side.       Achilles reflexes are 2+ on the right side and 2+ on the left side.  Psychiatric:         Speech: Speech normal.       Neurologic Exam     Mental Status   Oriented to person, place, and time.   Follows 3 step commands.   Attention: normal. Concentration: normal.   Speech: speech is normal   Level of consciousness: alert  Knowledge: consistent with education.   Normal comprehension.     Cranial Nerves     CN III, IV, VI  "  Pupils are equal, round, and reactive to light.  Right pupil: Accommodation: intact.   Left pupil: Accommodation: intact.   CN III: no CN III palsy  CN VI: no CN VI palsy  Nystagmus: none   Diplopia: none  Upgaze: normal  Downgaze: normal  Conjugate gaze: present    CN VII   Facial expression full, symmetric.     CN VIII   Hearing: intact    CN XII   CN XII normal.     Motor Exam   Muscle bulk: normal  Overall muscle tone: normal    Strength   Right biceps: 5/5  Left biceps: 5/5  Right triceps: 5/5  Left triceps: 5/5  Right interossei: 5/5  Left interossei: 5/5  Right quadriceps: 5/5  Left quadriceps: 5/5  Left anterior tibial: 5/5  Left posterior tibial: 5/5    Sensory Exam   Light touch normal.     Gait, Coordination, and Reflexes     Gait  Gait: normal    Coordination   Romberg: negative  Finger to nose coordination: normal  Heel to shin coordination: normal    Tremor   Resting tremor: absent  Action tremor: absent    Reflexes   Right brachioradialis: 2+  Left brachioradialis: 2+  Right biceps: 2+  Left biceps: 2+  Right triceps: 2+  Left triceps: 2+  Right patellar: 2+  Left patellar: 2+  Right achilles: 2+  Left achilles: 2+  Right : 2+  Left : 2+     Vital Signs:   Vitals:    08/28/23 1516   BP: 114/78   Pulse: 69   SpO2: 98%   Weight: 85.5 kg (188 lb 6.4 oz)   Height: 170.2 cm (67.01\")     Body mass index is 29.5 kg/mý.         Assessment / Plan      Assessment/Plan:   Diagnoses and all orders for this visit:    1. Partial idiopathic epilepsy with seizures of localized onset, intractable, without status epilepticus (Primary)  Comments:  Cont OXC and GBP  Orders:  -     OXcarbazepine (TRILEPTAL) 600 MG tablet; Take 1 tablet by mouth 2 (Two) Times a Day.  Dispense: 180 tablet; Refill: 3    2. Weakness of right foot  Comments:  Per ortho. Use AFO after out of boot    3. JOSE (obstructive sleep apnea)  Comments:  She wants to wait on this for now.    4. History of TIA (transient ischemic " attack)  Comments:  Cont asa and repatha    5. Status post VNS (vagus nerve stimulator) placement    6. Mild recurrent major depression  Comments:  She has a good support system. Continue to monitor.           Patient Education:       Reviewed medications, potential side effects and signs and symptoms to report. Discussed risk versus benefits of treatment plan with patient and/or family-including medications, labs and radiology that may be ordered. Addressed questions and concerns during visit. Patient and/or family verbalized understanding and agree with plan. Instructed to call the office with any questions and report to ER with any life-threatening symptoms.     Follow Up:   Return in about 6 months (around 2/28/2024), or video, for Recheck.    During this visit the following were done:  Labs Reviewed []    Labs Ordered []    Radiology Reports Reviewed []    Radiology Ordered []    PCP Records Reviewed []    Referring Provider Records Reviewed []    ER Records Reviewed []    Hospital Records Reviewed []    History Obtained From Family []    Radiology Images Reviewed []    Other Reviewed [x]    Records Requested []      Javier Galan, SOSA, APRN

## 2023-08-29 RX ORDER — ISOSORBIDE MONONITRATE 30 MG/1
30 TABLET, EXTENDED RELEASE ORAL EVERY MORNING
Qty: 30 TABLET | Refills: 5 | Status: SHIPPED | OUTPATIENT
Start: 2023-08-29

## 2023-10-02 RX ORDER — RANOLAZINE 500 MG/1
TABLET, EXTENDED RELEASE ORAL
Qty: 60 TABLET | Refills: 4 | Status: SHIPPED | OUTPATIENT
Start: 2023-10-02

## 2023-11-13 ENCOUNTER — TELEPHONE (OUTPATIENT)
Dept: CARDIOLOGY | Facility: CLINIC | Age: 58
End: 2023-11-13
Payer: MEDICAID

## 2023-11-13 NOTE — TELEPHONE ENCOUNTER
Caller: Phyllis Iyer    Relationship: Self    Best call back number: [phone]    What is the best time to reach you: ANY    Who are you requesting to speak with (clinical staff, provider,  specific staff member): CLINICAL      What was the call regarding: PT IS REQUESTING A REFERRAL SENT OVER TO BE SEEN BY DR. RANGEL, SHE WILL NEED HER MED RECS FAXED OVER AS WELL. PT STATES SHE HAS A LIFE VEST. SHE WOULD PREFER TO SEE DR. RANGEL NOW

## 2023-11-13 NOTE — TELEPHONE ENCOUNTER
Patient will call for appointment and contact PCP if referral is needed. Advised records will be faxed.

## 2024-02-27 RX ORDER — ISOSORBIDE MONONITRATE 30 MG/1
30 TABLET, EXTENDED RELEASE ORAL EVERY MORNING
Qty: 30 TABLET | Refills: 4 | Status: SHIPPED | OUTPATIENT
Start: 2024-02-27

## 2024-02-27 RX ORDER — RANOLAZINE 500 MG/1
TABLET, EXTENDED RELEASE ORAL
Qty: 60 TABLET | Refills: 3 | OUTPATIENT
Start: 2024-02-27

## 2024-03-07 RX ORDER — RANOLAZINE 500 MG/1
TABLET, EXTENDED RELEASE ORAL
Qty: 60 TABLET | Refills: 3 | Status: SHIPPED | OUTPATIENT
Start: 2024-03-07

## 2024-04-22 DIAGNOSIS — I10 ESSENTIAL HYPERTENSION: ICD-10-CM

## 2024-04-22 DIAGNOSIS — R00.2 PALPITATIONS: ICD-10-CM

## 2024-07-03 NOTE — TELEPHONE ENCOUNTER
Detail Level: Zone Patient called reporting she has wore the [employer] monitor 14 days and ordered to wear 30 days. She is unable to continue wearing it due to irritated skin and blisters. She has tried different skin placement. She does not feel she can continue even with sensitive strips. She has experienced the symptoms that prompt the monitor. She hopes this is enough information. She is returning monitor today. Message routed to Darryl KEENE and Silvia CHAMPION to terminate monitor. Julia Peters MA     Topical Sulfur Applications Pregnancy And Lactation Text: This medication is Pregnancy Category C and has an unknown safety profile during pregnancy. It is unknown if this topical medication is excreted in breast milk. Erythromycin Pregnancy And Lactation Text: This medication is Pregnancy Category B and is considered safe during pregnancy. It is also excreted in breast milk. Birth Control Pills Counseling: Birth Control Pill Counseling: I discussed with the patient the potential side effects of OCPs including but not limited to increased risk of stroke, heart attack, thrombophlebitis, deep venous thrombosis, hepatic adenomas, breast changes, GI upset, headaches, and depression.  The patient verbalized understanding of the proper use and possible adverse effects of OCPs. All of the patient's questions and concerns were addressed. Topical Clindamycin Pregnancy And Lactation Text: This medication is Pregnancy Category B and is considered safe during pregnancy. It is unknown if it is excreted in breast milk. Aklief Pregnancy And Lactation Text: It is unknown if this medication is safe to use during pregnancy.  It is unknown if this medication is excreted in breast milk.  Breastfeeding women should use the topical cream on the smallest area of the skin for the shortest time needed while breastfeeding.  Do not apply to nipple and areola. Sarecycline Counseling: Patient advised regarding possible photosensitivity and discoloration of the teeth, skin, lips, tongue and gums.  Patient instructed to avoid sunlight, if possible.  When exposed to sunlight, patients should wear protective clothing, sunglasses, and sunscreen.  The patient was instructed to call the office immediately if the following severe adverse effects occur:  hearing changes, easy bruising/bleeding, severe headache, or vision changes.  The patient verbalized understanding of the proper use and possible adverse effects of sarecycline.  All of the patient's questions and concerns were addressed. Spironolactone Counseling: Patient advised regarding risks of diarrhea, abdominal pain, hyperkalemia, birth defects (for female patients), liver toxicity and renal toxicity. The patient may need blood work to monitor liver and kidney function and potassium levels while on therapy. The patient verbalized understanding of the proper use and possible adverse effects of spironolactone.  All of the patient's questions and concerns were addressed. Dapsone Counseling: I discussed with the patient the risks of dapsone including but not limited to hemolytic anemia, agranulocytosis, rashes, methemoglobinemia, kidney failure, peripheral neuropathy, headaches, GI upset, and liver toxicity.  Patients who start dapsone require monitoring including baseline LFTs and weekly CBCs for the first month, then every month thereafter.  The patient verbalized understanding of the proper use and possible adverse effects of dapsone.  All of the patient's questions and concerns were addressed. Isotretinoin Pregnancy And Lactation Text: This medication is Pregnancy Category X and is considered extremely dangerous during pregnancy. It is unknown if it is excreted in breast milk. Tazorac Pregnancy And Lactation Text: This medication is not safe during pregnancy. It is unknown if this medication is excreted in breast milk. Azelaic Acid Pregnancy And Lactation Text: This medication is considered safe during pregnancy and breast feeding. Benzoyl Peroxide Pregnancy And Lactation Text: This medication is Pregnancy Category C. It is unknown if benzoyl peroxide is excreted in breast milk. Tetracycline Counseling: Patient counseled regarding possible photosensitivity and increased risk for sunburn.  Patient instructed to avoid sunlight, if possible.  When exposed to sunlight, patients should wear protective clothing, sunglasses, and sunscreen.  The patient was instructed to call the office immediately if the following severe adverse effects occur:  hearing changes, easy bruising/bleeding, severe headache, or vision changes.  The patient verbalized understanding of the proper use and possible adverse effects of tetracycline.  All of the patient's questions and concerns were addressed. Patient understands to avoid pregnancy while on therapy due to potential birth defects. Azithromycin Pregnancy And Lactation Text: This medication is considered safe during pregnancy and is also secreted in breast milk. Doxycycline Counseling:  Patient counseled regarding possible photosensitivity and increased risk for sunburn.  Patient instructed to avoid sunlight, if possible.  When exposed to sunlight, patients should wear protective clothing, sunglasses, and sunscreen.  The patient was instructed to call the office immediately if the following severe adverse effects occur:  hearing changes, easy bruising/bleeding, severe headache, or vision changes.  The patient verbalized understanding of the proper use and possible adverse effects of doxycycline.  All of the patient's questions and concerns were addressed. High Dose Vitamin A Pregnancy And Lactation Text: High dose vitamin A therapy is contraindicated during pregnancy and breast feeding. Winlevi Counseling:  I discussed with the patient the risks of topical clascoterone including but not limited to erythema, scaling, itching, and stinging. Patient voiced their understanding. Topical Retinoid Pregnancy And Lactation Text: This medication is Pregnancy Category C. It is unknown if this medication is excreted in breast milk. Bactrim Pregnancy And Lactation Text: This medication is Pregnancy Category D and is known to cause fetal risk.  It is also excreted in breast milk. Erythromycin Counseling:  I discussed with the patient the risks of erythromycin including but not limited to GI upset, allergic reaction, drug rash, diarrhea, increase in liver enzymes, and yeast infections. Minocycline Pregnancy And Lactation Text: This medication is Pregnancy Category D and not consider safe during pregnancy. It is also excreted in breast milk. Aklief counseling:  Patient advised to apply a pea-sized amount only at bedtime and wait 30 minutes after washing their face before applying.  If too drying, patient may add a non-comedogenic moisturizer.  The most commonly reported side effects including irritation, redness, scaling, dryness, stinging, burning, itching, and increased risk of sunburn.  The patient verbalized understanding of the proper use and possible adverse effects of retinoids.  All of the patient's questions and concerns were addressed. Topical Sulfur Applications Counseling: Topical Sulfur Counseling: Patient counseled that this medication may cause skin irritation or allergic reactions.  In the event of skin irritation, the patient was advised to reduce the amount of the drug applied or use it less frequently.   The patient verbalized understanding of the proper use and possible adverse effects of topical sulfur application.  All of the patient's questions and concerns were addressed. Birth Control Pills Pregnancy And Lactation Text: This medication should be avoided if pregnant and for the first 30 days post-partum. Isotretinoin Counseling: Patient should get monthly blood tests, not donate blood, not drive at night if vision affected, not share medication, and not undergo elective surgery for 6 months after tx completed. Side effects reviewed, pt to contact office should one occur. Topical Clindamycin Counseling: Patient counseled that this medication may cause skin irritation or allergic reactions.  In the event of skin irritation, the patient was advised to reduce the amount of the drug applied or use it less frequently.   The patient verbalized understanding of the proper use and possible adverse effects of clindamycin.  All of the patient's questions and concerns were addressed. Azelaic Acid Counseling: Patient counseled that medicine may cause skin irritation and to avoid applying near the eyes.  In the event of skin irritation, the patient was advised to reduce the amount of the drug applied or use it less frequently.   The patient verbalized understanding of the proper use and possible adverse effects of azelaic acid.  All of the patient's questions and concerns were addressed. Benzoyl Peroxide Counseling: Patient counseled that medicine may cause skin irritation and bleach clothing.  In the event of skin irritation, the patient was advised to reduce the amount of the drug applied or use it less frequently.   The patient verbalized understanding of the proper use and possible adverse effects of benzoyl peroxide.  All of the patient's questions and concerns were addressed. Azithromycin Counseling:  I discussed with the patient the risks of azithromycin including but not limited to GI upset, allergic reaction, drug rash, diarrhea, and yeast infections. Dapsone Pregnancy And Lactation Text: This medication is Pregnancy Category C and is not considered safe during pregnancy or breast feeding. Spironolactone Pregnancy And Lactation Text: This medication can cause feminization of the male fetus and should be avoided during pregnancy. The active metabolite is also found in breast milk. High Dose Vitamin A Counseling: Side effects reviewed, pt to contact office should one occur. Use Enhanced Medication Counseling?: No Winlevi Pregnancy And Lactation Text: This medication is considered safe during pregnancy and breastfeeding. Tazorac Counseling:  Patient advised that medication is irritating and drying.  Patient may need to apply sparingly and wash off after an hour before eventually leaving it on overnight.  The patient verbalized understanding of the proper use and possible adverse effects of tazorac.  All of the patient's questions and concerns were addressed. Topical Retinoid counseling:  Patient advised to apply a pea-sized amount only at bedtime and wait 30 minutes after washing their face before applying.  If too drying, patient may add a non-comedogenic moisturizer. The patient verbalized understanding of the proper use and possible adverse effects of retinoids.  All of the patient's questions and concerns were addressed. Bactrim Counseling:  I discussed with the patient the risks of sulfa antibiotics including but not limited to GI upset, allergic reaction, drug rash, diarrhea, dizziness, photosensitivity, and yeast infections.  Rarely, more serious reactions can occur including but not limited to aplastic anemia, agranulocytosis, methemoglobinemia, blood dyscrasias, liver or kidney failure, lung infiltrates or desquamative/blistering drug rashes. Doxycycline Pregnancy And Lactation Text: This medication is Pregnancy Category D and not consider safe during pregnancy. It is also excreted in breast milk but is considered safe for shorter treatment courses. Minocycline Counseling: Patient advised regarding possible photosensitivity and discoloration of the teeth, skin, lips, tongue and gums.  Patient instructed to avoid sunlight, if possible.  When exposed to sunlight, patients should wear protective clothing, sunglasses, and sunscreen.  The patient was instructed to call the office immediately if the following severe adverse effects occur:  hearing changes, easy bruising/bleeding, severe headache, or vision changes.  The patient verbalized understanding of the proper use and possible adverse effects of minocycline.  All of the patient's questions and concerns were addressed.

## 2024-07-23 RX ORDER — ISOSORBIDE MONONITRATE 30 MG/1
30 TABLET, EXTENDED RELEASE ORAL EVERY MORNING
Qty: 30 TABLET | Refills: 0 | Status: SHIPPED | OUTPATIENT
Start: 2024-07-23 | End: 2024-07-24

## 2024-07-23 NOTE — TELEPHONE ENCOUNTER
Refill sent as a 30 day supply, pharmacy noted patient needs follow up to receive anymore refills.

## 2024-07-24 RX ORDER — ISOSORBIDE MONONITRATE 30 MG/1
30 TABLET, EXTENDED RELEASE ORAL EVERY MORNING
Qty: 30 TABLET | Refills: 0 | Status: SHIPPED | OUTPATIENT
Start: 2024-07-24

## 2024-09-18 DIAGNOSIS — G40.019 PARTIAL IDIOPATHIC EPILEPSY WITH SEIZURES OF LOCALIZED ONSET, INTRACTABLE, WITHOUT STATUS EPILEPTICUS: ICD-10-CM

## 2024-09-18 RX ORDER — OXCARBAZEPINE 600 MG/1
600 TABLET, FILM COATED ORAL 2 TIMES DAILY
Qty: 60 TABLET | Refills: 0 | Status: SHIPPED | OUTPATIENT
Start: 2024-09-18

## 2024-09-18 RX ORDER — ISOSORBIDE MONONITRATE 30 MG/1
TABLET, EXTENDED RELEASE ORAL
Qty: 30 TABLET | Refills: 0 | OUTPATIENT
Start: 2024-09-18

## 2025-02-13 ENCOUNTER — TELEPHONE (OUTPATIENT)
Dept: NEUROSURGERY | Facility: CLINIC | Age: 60
End: 2025-02-13

## 2025-02-13 NOTE — TELEPHONE ENCOUNTER
Provider:  Néstor CHRISTIANSON  Surgery/Procedure:   VNS placement  Surgery/Procedure Date:  2009  Last visit:  7/1/22   Next visit:      Reason for call:  Sussy at [facility] called stating patient has had a hemorrhagic stroke they are treating. They want to do an MRI. Patient has a history of VNS placement.     Can patient have an MRI?     Please call Sussy to discuss/advise. Her cell number is [phone].

## 2025-02-13 NOTE — TELEPHONE ENCOUNTER
Left Sussy a voicemail. The VNS does not look to be compatible for MRI's. In looking at previous notes, we have had to do CT myelogram's of her lumbar spine instead of an MRI due to the stimulator.

## 2025-02-19 ENCOUNTER — TELEPHONE (OUTPATIENT)
Dept: NEUROLOGY | Facility: CLINIC | Age: 60
End: 2025-02-19
Payer: MEDICARE

## 2025-02-21 RX ORDER — CLOPIDOGREL BISULFATE 75 MG/1
75 TABLET ORAL DAILY
COMMUNITY
Start: 2024-12-05 | End: 2025-02-26

## 2025-02-21 RX ORDER — FLUTICASONE PROPIONATE 50 MCG
SPRAY, SUSPENSION (ML) NASAL
COMMUNITY
Start: 2025-01-17

## 2025-02-21 RX ORDER — FAMOTIDINE 40 MG/1
40 TABLET, FILM COATED ORAL 2 TIMES DAILY
COMMUNITY
Start: 2025-02-17 | End: 2025-02-26

## 2025-02-21 RX ORDER — ASPIRIN 81 MG/1
81 TABLET ORAL DAILY
COMMUNITY
End: 2025-02-26

## 2025-02-26 ENCOUNTER — OFFICE VISIT (OUTPATIENT)
Dept: NEUROLOGY | Facility: CLINIC | Age: 60
End: 2025-02-26
Payer: MEDICARE

## 2025-02-26 ENCOUNTER — LAB (OUTPATIENT)
Dept: LAB | Facility: HOSPITAL | Age: 60
End: 2025-02-26
Payer: MEDICARE

## 2025-02-26 VITALS
WEIGHT: 187.6 LBS | HEART RATE: 84 BPM | BODY MASS INDEX: 29.44 KG/M2 | HEIGHT: 67 IN | SYSTOLIC BLOOD PRESSURE: 118 MMHG | DIASTOLIC BLOOD PRESSURE: 90 MMHG | OXYGEN SATURATION: 98 %

## 2025-02-26 DIAGNOSIS — Z86.73 HISTORY OF STROKE: Primary | ICD-10-CM

## 2025-02-26 DIAGNOSIS — R25.9 ABNORMAL MOVEMENTS: ICD-10-CM

## 2025-02-26 DIAGNOSIS — F33.0 MILD RECURRENT MAJOR DEPRESSION: ICD-10-CM

## 2025-02-26 DIAGNOSIS — G40.019 PARTIAL IDIOPATHIC EPILEPSY WITH SEIZURES OF LOCALIZED ONSET, INTRACTABLE, WITHOUT STATUS EPILEPTICUS: ICD-10-CM

## 2025-02-26 DIAGNOSIS — Z86.73 HISTORY OF STROKE: ICD-10-CM

## 2025-02-26 DIAGNOSIS — Z96.89 STATUS POST VNS (VAGUS NERVE STIMULATOR) PLACEMENT: ICD-10-CM

## 2025-02-26 LAB
ALBUMIN SERPL-MCNC: 3.8 G/DL (ref 3.5–5.2)
ALBUMIN/GLOB SERPL: 1.5 G/DL
ALP SERPL-CCNC: 123 U/L (ref 39–117)
ALT SERPL W P-5'-P-CCNC: 15 U/L (ref 1–33)
ANION GAP SERPL CALCULATED.3IONS-SCNC: 13 MMOL/L (ref 5–15)
AST SERPL-CCNC: 15 U/L (ref 1–32)
BILIRUB SERPL-MCNC: 0.2 MG/DL (ref 0–1.2)
BUN SERPL-MCNC: 12 MG/DL (ref 8–23)
BUN/CREAT SERPL: 12.1 (ref 7–25)
CALCIUM SPEC-SCNC: 9.2 MG/DL (ref 8.6–10.5)
CHLORIDE SERPL-SCNC: 98 MMOL/L (ref 98–107)
CO2 SERPL-SCNC: 23 MMOL/L (ref 22–29)
CREAT SERPL-MCNC: 0.99 MG/DL (ref 0.57–1)
EGFRCR SERPLBLD CKD-EPI 2021: 65 ML/MIN/1.73
GLOBULIN UR ELPH-MCNC: 2.6 GM/DL
GLUCOSE SERPL-MCNC: 526 MG/DL (ref 65–99)
POTASSIUM SERPL-SCNC: 3.7 MMOL/L (ref 3.5–5.2)
PROT SERPL-MCNC: 6.4 G/DL (ref 6–8.5)
SODIUM SERPL-SCNC: 134 MMOL/L (ref 136–145)

## 2025-02-26 PROCEDURE — 85025 COMPLETE CBC W/AUTO DIFF WBC: CPT

## 2025-02-26 PROCEDURE — 80171 DRUG SCREEN QUANT GABAPENTIN: CPT

## 2025-02-26 PROCEDURE — 80156 ASSAY CARBAMAZEPINE TOTAL: CPT

## 2025-02-26 PROCEDURE — 36415 COLL VENOUS BLD VENIPUNCTURE: CPT

## 2025-02-26 PROCEDURE — 80053 COMPREHEN METABOLIC PANEL: CPT

## 2025-02-26 RX ORDER — ROSUVASTATIN CALCIUM 10 MG/1
10 TABLET, COATED ORAL NIGHTLY
Qty: 30 TABLET | Refills: 11 | Status: SHIPPED | OUTPATIENT
Start: 2025-02-26 | End: 2026-02-26

## 2025-02-26 RX ORDER — HYDROCHLOROTHIAZIDE 12.5 MG/1
12.5 TABLET ORAL DAILY
COMMUNITY

## 2025-02-26 RX ORDER — CITALOPRAM HYDROBROMIDE 20 MG/1
1 TABLET ORAL DAILY
COMMUNITY
End: 2025-02-26

## 2025-02-26 RX ORDER — METOPROLOL SUCCINATE 25 MG/1
25 TABLET, EXTENDED RELEASE ORAL DAILY
COMMUNITY

## 2025-02-26 RX ORDER — PANTOPRAZOLE SODIUM 40 MG/1
1 TABLET, DELAYED RELEASE ORAL DAILY
COMMUNITY

## 2025-02-26 RX ORDER — OXCARBAZEPINE 600 MG/1
600 TABLET, FILM COATED ORAL 2 TIMES DAILY
Qty: 60 TABLET | Refills: 0 | Status: SHIPPED | OUTPATIENT
Start: 2025-02-26

## 2025-02-26 RX ORDER — PROMETHAZINE HYDROCHLORIDE 25 MG/1
1 TABLET ORAL 2 TIMES DAILY PRN
COMMUNITY

## 2025-02-26 RX ORDER — HYDROCODONE BITARTRATE AND ACETAMINOPHEN 7.5; 325 MG/1; MG/1
TABLET ORAL EVERY 6 HOURS PRN
COMMUNITY

## 2025-02-26 RX ORDER — CYCLOBENZAPRINE HCL 10 MG
10 TABLET ORAL 3 TIMES DAILY PRN
COMMUNITY
End: 2025-02-26

## 2025-02-26 RX ORDER — LORATADINE 10 MG/1
10 TABLET ORAL DAILY
COMMUNITY
End: 2025-02-26

## 2025-02-26 RX ORDER — FAMOTIDINE 20 MG/1
20 TABLET, FILM COATED ORAL
COMMUNITY

## 2025-02-26 NOTE — PROGRESS NOTES
Neuro Office Visit      Encounter Date: 2025   Patient Name: Phyllis Iyer  : 1965   MRN: [id number]   PCP: Dr Early  Chief Complaint:    Chief Complaint   Patient presents with    Stroke    Seizures       History of Present Illness: Phyllis Iyer is a 59 y.o. female who is here today in Neurology for new stroke. partial idiopathic epilepsy w VNS, weakness of right foot, history of TIA, JOSE, MDD      Last visit 23 w me-cont oxc and GBP, Use AFO, eval w sleep med, cont asa and repatha  Pt has been lost to follow up.    History of Present Illness  The patient presents for evaluation of stroke, hypertension, seizures, and medication management.      Recent stroke  Two weeks ago, pt had a hemorrhagic stroke from a brain bleed. Had extremely elevated bp. She was compliant with crestor and plavix.  She cannot have MRI  due to VNS. Her symptoms included right arm numbness, tingling and paralysis.  She was admitted and had CTH, CTA H/N. I do not have DC summary or any of the radiology reports.    She complains today of 2 episodes of RUE abnormal movements and brings in a video where she has to catch her right arm with her left hand and hold it down.  Episodes last 10-13 minutes.  Initially, the arm was immobile, but some improvement has been noted. Persistent numbness in the right arm remains, with movement restricted due to lack of strength rather than pain. No other symptoms such as vision changes, slurred speech, difficulty swallowing, or walking were reported.   States plavix and crestor were discontinued at discharge and she has not re-started these medications.    She denies any current stroke sx. BP has been controlled at home.    Since she saw me last had an MI (data deficient).     No medications were prescribed or changed during the hospital stay. Rehabilitation included walking exercises the following morning. No cardiac tests were completed during the hospital stay. Currently under the  care of cardiologist Dr. Quesada, last seen 3 months ago. A CT scan of the neck without contrast was performed. History includes heart attacks and broken heart syndrome, with 35 percent heart function. Previously on Plavix and Crestor, advised to discontinue due to the brain bleed. Crestor has not been taken for some time, and it is unclear if the cardiologist is aware of the absence of a statin. Scheduled to start therapy and a 2-week rehabilitation program in the hospital.         No typical seizures have been experienced recently. VNS is currently turned off. The last seizure occurred in May 2023.                History of TIA/CVA  She did not get AFO for right foot and sustained Right foot fracture. Now in walking boot. Trying to avoid surgery.  Right hand and right foot is numb and weak. Right foot will dany when walking. She can trip due to foot weakness.  Seen by cardiology and placed on Repatha. Abnml GXT  [facility] 9/1/22 w Dr Wills. Significant 1 vessel disease w high grade stenosis first diagonal of LAD jailed by 2 stents. Mild in-stent stenosis of mid LAD and patent RCA. EF 66%-medical management adding amlodipine.     PH  3/30/2022 admitted at [facility] for TIA sx of right sided weakness and numbness.  Describes walking down the [place] and her right leg gave out. Developed RUE and RLE numbness and weakness. No vision changes, slurred speech or confusion. Symptoms mostly resolved by the time she arrived in ED.  Overall, it was felt her symptoms were consistent with TIA.  CTH-calcified meningioma right frontal lobe 7.5 mm in diameter similar to previous study. No hemorrhage.  CTA head and neck-no stenosis  MRI brain not done due to VNS  Echo-neg bubble study w nml EF 66%  A1c 10.2  Dc'd on lopresser 25 bid, imdur 60, plavix 75, asa 81, Crestor 20, trileptal 600 bid     Seizure Disorderw VNS  VNS turned off  Medication: bid w  qid  Compliance:does not miss doses  Side  effects:None  Last seizure:5/2023. She's not sure she had a sz but when she woke up her face was numb.  Description:right facial, eye, tongue drawing. Bites her tongue. No loss of bladder or bowel. Usually happens at night.     JOSE  Referred to sleep med. She did not make appt and does not want to go at this time.     Depression  Recently lost her  tragically to arm sepsis. Denies SI.     PMH: CAD with multiple stents, T2DM, HLD, HTN, Sx disorder with VNS, chronic back pain   Subjective      Past Medical History:   Past Medical History:   Diagnosis Date    Arthritis     CAD (coronary artery disease)     Chest tightness or pressure     Complex partial seizure     Diabetes mellitus     Dyslipidemia     FHx: migraine headaches     GERD (gastroesophageal reflux disease)     Hiatal hernia     Hypercholesterolemia     Hypertension     Myocardial infarction     JOSE on CPAP     PONV (postoperative nausea and vomiting)     Stroke syndrome     Stroke syndrome        Past Surgical History:   Past Surgical History:   Procedure Laterality Date    CARDIAC CATHETERIZATION      11 stents    CARDIAC CATHETERIZATION Left 9/1/2022    Procedure: Coronary angiography;  Surgeon: Jose G Wills IV, MD;  Location: [facility] CATH INVASIVE LOCATION;  Service: Cardiovascular;  Laterality: Left;    CORONARY ANGIOPLASTY WITH STENT PLACEMENT      CORONARY STENT PLACEMENT      x5    IMPLANTATION VAGAL NERVE STIMULATOR      LUMBAR FUSION  07/21/2014    decompression and fusion L4/5 Dr. Palm       Family History:   Family History   Problem Relation Age of Onset    Heart disease Other     Diabetes Other     Cancer Other     Stroke Other     Coronary artery disease Mother     Heart attack Mother     Coronary artery disease Father        Social History:   Social History     Socioeconomic History    Marital status:    Tobacco Use    Smoking status: Former    Smokeless tobacco: Never   Vaping Use    Vaping status: Never Used    Substance and Sexual Activity    Alcohol use: No    Drug use: Defer    Sexual activity: Defer       Medications:     Current Outpatient Medications:     amLODIPine (NORVASC) 2.5 MG tablet, Take 1 tablet by mouth Daily., Disp: 90 tablet, Rfl: 1    BD Pen Needle Sherrill 2nd Gen 32G X 4 MM misc, , Disp: , Rfl:     diazePAM (VALIUM) 10 MG tablet, As Needed., Disp: , Rfl:     DULoxetine (CYMBALTA) 30 MG capsule, Take 1 capsule by mouth Daily., Disp: , Rfl:     famotidine (PEPCID) 20 MG tablet, Take 1 tablet by mouth., Disp: , Rfl:     fluticasone (FLONASE) 50 MCG/ACT nasal spray, , Disp: , Rfl:     gabapentin (NEURONTIN) 600 MG tablet, 4 (Four) Times a Day., Disp: , Rfl:     hydroCHLOROthiazide 12.5 MG tablet, Take 1 tablet by mouth Daily., Disp: , Rfl:     HYDROcodone-acetaminophen (NORCO) 7.5-325 MG per tablet, Every 6 (Six) Hours As Needed., Disp: , Rfl:     isosorbide mononitrate (IMDUR) 30 MG 24 hr tablet, Take 1 tablet by mouth Every Morning. NEEDS FOLLOW UP TO CONTINUE REFILLS, Disp: 30 tablet, Rfl: 0    Lantus SoloStar 100 UNIT/ML injection pen, Inject 50 Units under the skin into the appropriate area as directed 2 (Two) Times a Day., Disp: , Rfl:     metoprolol succinate XL (TOPROL-XL) 25 MG 24 hr tablet, Take 1 tablet by mouth Daily., Disp: , Rfl:     nitroglycerin (Nitrostat) 0.4 MG SL tablet, Place 1 tablet under the tongue Every 5 (Five) Minutes As Needed for Chest Pain. prn, Disp: 25 tablet, Rfl: 1    OXcarbazepine (TRILEPTAL) 600 MG tablet, Take 1 tablet by mouth 2 (Two) Times a Day. Call to schedule an office visit for further refills.  Thank you, Disp: 60 tablet, Rfl: 0    pantoprazole (PROTONIX) 40 MG EC tablet, Take 1 tablet by mouth Daily., Disp: , Rfl:     promethazine (PHENERGAN) 25 MG tablet, Take 1 tablet by mouth 2 (Two) Times a Day As Needed., Disp: , Rfl:     traZODone (DESYREL) 150 MG tablet, TAKE ONE TABLET BY MOUTH EVERY NIGHT, Disp: 30 tablet, Rfl: 4    rosuvastatin (Crestor) 10 MG tablet,  Take 1 tablet by mouth Every Night., Disp: 30 tablet, Rfl: 11    Allergies:   Allergies   Allergen Reactions    Sulfa Antibiotics GI Intolerance       PHQ-9 Total Score:     STEADI Fall Risk Assessment has not been completed.    Objective     Physical Exam:   Physical Exam  Eyes:      General: Lids are normal.      Extraocular Movements: EOM normal. No nystagmus.   Neurological:      Coordination: Romberg sign negative.      Deep Tendon Reflexes:      Reflex Scores:       Bicep reflexes are 2+ on the right side and 2+ on the left side.       Brachioradialis reflexes are 2+ on the right side and 2+ on the left side.       Patellar reflexes are 2+ on the right side and 2+ on the left side.       Achilles reflexes are 2+ on the right side and 2+ on the left side.  Psychiatric:         Speech: Speech normal.         Neurological Exam  Mental Status  Awake, alert and oriented to person, place and time. Recent and remote memory are intact. Speech is normal. Follows three-step commands. Attention and concentration are normal. Fund of knowledge is appropriate for level of education.    Cranial Nerves  CN II: Right visual acuity: Finger movement. Left visual acuity: Finger movement. Right normal visual field. Left normal visual field.  CN III, IV, VI: Extraocular movements intact bilaterally. No nystagmus. Normal saccades. Normal lids and orbits bilaterally.   Right pupil: Round.   Left pupil: Round.  CN V: Facial sensation is normal.  CN VII: Full and symmetric facial movement.  CN IX, X: Palate elevates symmetrically  CN XI: Shoulder shrug strength is normal.  CN XII: Tongue midline without atrophy or fasciculations.    Motor  Normal muscle bulk throughout. No fasciculations present. Normal muscle tone. No abnormal involuntary movements. Right pronator drift.  RUE weakness.    Sensory  Sensation is intact to light touch, pinprick, vibration and proprioception in all four extremities.    Reflexes                              "               Right                      Left  Brachioradialis                    2+                         2+  Biceps                                 2+                         2+  Patellar                                2+                         2+  Achilles                                2+                         2+    Right pathological reflexes: Ankle clonus absent.  Left pathological reflexes: Ankle clonus absent.    Coordination  Right: Finger-to-nose normal. Rapid alternating movement normal. Heel-to-shin normal.    Gait  Normal casual, toe, heel and tandem gait. Romberg is absent. Able to rise from chair without using arms.     Physical Exam  Neurologic exam was performed.      Vital Signs:   Vitals:    02/26/25 1316   BP: 118/90   Pulse: 84   SpO2: 98%   Weight: 85.1 kg (187 lb 9.6 oz)   Height: 170.2 cm (67\")     Body mass index is 29.38 kg/m².         Assessment / Plan      Assessment/Plan:   Diagnoses and all orders for this visit:    1. History of stroke (Primary)  Comments:  Obtain records. Will likely need to re-start low dose ASA once scans reviewed.  Today resume crestor.  Control BP. FU w cards for CUS, Echo, holter  Orders:  -     CBC Auto Differential; Future  -     Comprehensive Metabolic Panel; Future  -     rosuvastatin (Crestor) 10 MG tablet; Take 1 tablet by mouth Every Night.  Dispense: 30 tablet; Refill: 11    2. Partial idiopathic epilepsy with seizures of localized onset, intractable, without status epilepticus  Comments:  Cont OXC and GBP. Repeat EEG  Orders:  -     OXcarbazepine (TRILEPTAL) 600 MG tablet; Take 1 tablet by mouth 2 (Two) Times a Day. Call to schedule an office visit for further refills.  Thank you  Dispense: 60 tablet; Refill: 0  -     Carbamazepine Level, Total; Future  -     Gabapentin Level; Future  -     EEG (Hospital Performed); Future    3. Abnormal movements  Comments:  Consider right hemiballismus, vs sz    4. Status post VNS (vagus nerve stimulator) " placement    5. Mild recurrent major depression       Assessment & Plan  1. Cerebrovascular accident.  The patient experienced a hemorrhagic stroke 2 weeks ago, resulting in right arm weakness and abnormal movements. Blood pressure was significantly elevated at 198/120 mmHg during the event. The patient was not on any antiplatelet therapy or statin at the time of the stroke. An EEG will be ordered to evaluate for potential focal seizures. The patient is advised to obtain and provide her medical records from her recent hospital stay for further evaluation. She is also advised to inform her cardiologist about the recent stroke to facilitate the repetition of her echocardiogram and Holter monitor tests. A carotid ultrasound will be ordered. The patient will be restarted on Crestor and Plavix, pending review of her medical records. Blood work will be conducted today.    2. Hypertension.  Blood pressure was significantly elevated at 198/120 mmHg during the stroke. The patient is advised to continue monitoring her blood pressure at home and to contact the clinic if it becomes elevated again.    3. Seizure disorder.  The patient is currently on gabapentin 600 mg 4 times a day and oxcarbazepine 600 mg twice a day, which should help manage her seizures. She has not experienced any typical seizures recently. An EEG will be ordered to evaluate for potential focal seizures.    4. Medication management.  The patient is currently on multiple medications, including amlodipine 2.5 mg, Valium, Cymbalta 30 mg, Pepcid, Flonase, gabapentin 600 mg 4 times a day, hydrochlorothiazide, Norco, Imdur, Lantus, metoprolol 25 mg once a day, and oxcarbazepine 600 mg twice a day. She is advised to continue her current medication regimen. A refill for oxcarbazepine will be provided.    Follow-up  The patient will follow up in 3 months.        Patient Education:   I have instructed and given the patient education on the importance of not driving  and operating heavy machinery. No solo bathing or tub baths for 3 months from onset of the most recent seizure.   Minimize stress as much as possible. I have recommended 7-8 hours of sleep each night. Abstain from alcohol intake. Educated on Antiepileptic medications with possible side effects and signs and symptoms to report if prescribed during visit. Instructed to take seizure medication daily if prescribed. Reviewed potential seizure risk factors.   I have instructed the patient to call 911 or our office if another seizure does occur. Patient verbalizes and understands.   Discussed signs and symptoms of stroke and when to call 911. Instructed to follow a low fat diet including the Mediterranean diet. Instructed to take all medications daily as prescribed. Encouraged 30 minutes of exercise 3-4 times a week as tolerated. Stay well hydrated. Discussed potential side effects of new medications and signs and symptoms to report. If patient is currently using tobacco products, smoking cessation was encouraged. Reviewed stroke risk factors and stroke prevention plan. Patient and/or family verbalizes understanding and agrees with plan.         Reviewed medications, potential side effects and signs and symptoms to report. Discussed risk versus benefits of treatment plan with patient and/or family-including medications, labs and radiology that may be ordered. Addressed questions and concerns during visit. Patient and/or family verbalized understanding and agree with plan. Instructed to call the office with any questions and report to ER with any life-threatening symptoms.     Follow Up:   Return in about 3 months (around 5/26/2025) for Recheck.    During this visit the following were done:  Labs Reviewed [x]    Labs Ordered [x]    Radiology Reports Reviewed []    Radiology Ordered []    PCP Records Reviewed [x]    Referring Provider Records Reviewed []    ER Records Reviewed []    Hospital Records Reviewed []    History  Obtained From Family [x]    Radiology Images Reviewed []    Other Reviewed []    Records Requested [x]      Patient or patient representative verbalized consent for the use of Ambient Listening during the visit with  Javier Galan DNP, APRN for chart documentation. 2/26/2025  11:22 EST      Javier Galan DNP, APRN

## 2025-02-26 NOTE — LETTER
2025     Jack Early MD  [address] [place] [id number]    Patient: Phyllis Iyer   YOB: 1965   Date of Visit: 2025     Dear Jack Early MD:       Thank you for referring Phyllis Iyer to me for evaluation. Below are the relevant portions of my assessment and plan of care.    If you have questions, please do not hesitate to call me. I look forward to following Phyllis along with you.         Sincerely,        Javier Galan DNP, APRN        CC: No Recipients    Javier Galan DNP, APRN  [phone]  Signed     Neuro Office Visit      Encounter Date: 2025   Patient Name: Phyllis Iyer  : 1965   MRN: [id number]   PCP: Dr Early  Chief Complaint:    Chief Complaint   Patient presents with   • Stroke   • Seizures       History of Present Illness: Phyllis Iyer is a 59 y.o. female who is here today in Neurology for new stroke. partial idiopathic epilepsy w VNS, weakness of right foot, history of TIA, JOSE, MDD      Last visit 23 w me-cont oxc and GBP, Use AFO, eval w sleep med, cont asa and repatha  Pt has been lost to follow up.    History of Present Illness  The patient presents for evaluation of stroke, hypertension, seizures, and medication management.      Recent stroke  Two weeks ago, pt had a hemorrhagic stroke from a brain bleed. Had extremely elevated bp. She was compliant with crestor and plavix.  She cannot have MRI  due to VNS. Her symptoms included right arm numbness, tingling and paralysis.  She was admitted and had CTH, CTA H/N. I do not have DC summary or any of the radiology reports.    She complains today of 2 episodes of RUE abnormal movements and brings in a video where she has to catch her right arm with her left hand and hold it down.  Episodes last 10-13 minutes.  Initially, the arm was immobile, but some improvement has been noted. Persistent numbness in the right arm remains, with movement  restricted due to lack of strength rather than pain. No other symptoms such as vision changes, slurred speech, difficulty swallowing, or walking were reported.   States plavix and crestor were discontinued at discharge and she has not re-started these medications.    She denies any current stroke sx. BP has been controlled at home.    Since she saw me last had an MI (data deficient).     No medications were prescribed or changed during the hospital stay. Rehabilitation included walking exercises the following morning. No cardiac tests were completed during the hospital stay. Currently under the care of cardiologist Dr. Quesada, last seen 3 months ago. A CT scan of the neck without contrast was performed. History includes heart attacks and broken heart syndrome, with 35 percent heart function. Previously on Plavix and Crestor, advised to discontinue due to the brain bleed. Crestor has not been taken for some time, and it is unclear if the cardiologist is aware of the absence of a statin. Scheduled to start therapy and a 2-week rehabilitation program in the hospital.         No typical seizures have been experienced recently. VNS is currently turned off. The last seizure occurred in May 2023.                History of TIA/CVA  She did not get AFO for right foot and sustained Right foot fracture. Now in walking boot. Trying to avoid surgery.  Right hand and right foot is numb and weak. Right foot will dany when walking. She can trip due to foot weakness.  Seen by cardiology and placed on Repatha. Abnml GXT  [facility] 9/1/22 w Dr Wills. Significant 1 vessel disease w high grade stenosis first diagonal of LAD jailed by 2 stents. Mild in-stent stenosis of mid LAD and patent RCA. EF 66%-medical management adding amlodipine.     PH  3/30/2022 admitted at [facility] for TIA sx of right sided weakness and numbness.  Describes walking down the [place] and her right leg gave out. Developed RUE and RLE numbness and  weakness. No vision changes, slurred speech or confusion. Symptoms mostly resolved by the time she arrived in ED.  Overall, it was felt her symptoms were consistent with TIA.  CTH-calcified meningioma right frontal lobe 7.5 mm in diameter similar to previous study. No hemorrhage.  CTA head and neck-no stenosis  MRI brain not done due to VNS  Echo-neg bubble study w nml EF 66%  A1c 10.2  Dc'd on lopresser 25 bid, imdur 60, plavix 75, asa 81, Crestor 20, trileptal 600 bid     Seizure Disorderw VNS  VNS turned off  Medication: bid w  qid  Compliance:does not miss doses  Side effects:None  Last seizure:5/2023. She's not sure she had a sz but when she woke up her face was numb.  Description:right facial, eye, tongue drawing. Bites her tongue. No loss of bladder or bowel. Usually happens at night.     JOSE  Referred to sleep med. She did not make appt and does not want to go at this time.     Depression  Recently lost her  tragically to arm sepsis. Denies SI.     PMH: CAD with multiple stents, T2DM, HLD, HTN, Sx disorder with VNS, chronic back pain   Subjective      Past Medical History:   Past Medical History:   Diagnosis Date   • Arthritis    • CAD (coronary artery disease)    • Chest tightness or pressure    • Complex partial seizure    • Diabetes mellitus    • Dyslipidemia    • FHx: migraine headaches    • GERD (gastroesophageal reflux disease)    • Hiatal hernia    • Hypercholesterolemia    • Hypertension    • Myocardial infarction    • JOSE on CPAP    • PONV (postoperative nausea and vomiting)    • Stroke syndrome    • Stroke syndrome        Past Surgical History:   Past Surgical History:   Procedure Laterality Date   • CARDIAC CATHETERIZATION      11 stents   • CARDIAC CATHETERIZATION Left 9/1/2022    Procedure: Coronary angiography;  Surgeon: Jose G Wills IV, MD;  Location: [facility] CATH INVASIVE LOCATION;  Service: Cardiovascular;  Laterality: Left;   • CORONARY ANGIOPLASTY WITH  STENT PLACEMENT     • CORONARY STENT PLACEMENT      x5   • IMPLANTATION VAGAL NERVE STIMULATOR     • LUMBAR FUSION  07/21/2014    decompression and fusion L4/5 Dr. Palm       Family History:   Family History   Problem Relation Age of Onset   • Heart disease Other    • Diabetes Other    • Cancer Other    • Stroke Other    • Coronary artery disease Mother    • Heart attack Mother    • Coronary artery disease Father        Social History:   Social History     Socioeconomic History   • Marital status:    Tobacco Use   • Smoking status: Former   • Smokeless tobacco: Never   Vaping Use   • Vaping status: Never Used   Substance and Sexual Activity   • Alcohol use: No   • Drug use: Defer   • Sexual activity: Defer       Medications:     Current Outpatient Medications:   •  amLODIPine (NORVASC) 2.5 MG tablet, Take 1 tablet by mouth Daily., Disp: 90 tablet, Rfl: 1  •  BD Pen Needle Sherrill 2nd Gen 32G X 4 MM misc, , Disp: , Rfl:   •  diazePAM (VALIUM) 10 MG tablet, As Needed., Disp: , Rfl:   •  DULoxetine (CYMBALTA) 30 MG capsule, Take 1 capsule by mouth Daily., Disp: , Rfl:   •  famotidine (PEPCID) 20 MG tablet, Take 1 tablet by mouth., Disp: , Rfl:   •  fluticasone (FLONASE) 50 MCG/ACT nasal spray, , Disp: , Rfl:   •  gabapentin (NEURONTIN) 600 MG tablet, 4 (Four) Times a Day., Disp: , Rfl:   •  hydroCHLOROthiazide 12.5 MG tablet, Take 1 tablet by mouth Daily., Disp: , Rfl:   •  HYDROcodone-acetaminophen (NORCO) 7.5-325 MG per tablet, Every 6 (Six) Hours As Needed., Disp: , Rfl:   •  isosorbide mononitrate (IMDUR) 30 MG 24 hr tablet, Take 1 tablet by mouth Every Morning. NEEDS FOLLOW UP TO CONTINUE REFILLS, Disp: 30 tablet, Rfl: 0  •  Lantus SoloStar 100 UNIT/ML injection pen, Inject 50 Units under the skin into the appropriate area as directed 2 (Two) Times a Day., Disp: , Rfl:   •  metoprolol succinate XL (TOPROL-XL) 25 MG 24 hr tablet, Take 1 tablet by mouth Daily., Disp: , Rfl:   •  nitroglycerin (Nitrostat) 0.4  MG SL tablet, Place 1 tablet under the tongue Every 5 (Five) Minutes As Needed for Chest Pain. prn, Disp: 25 tablet, Rfl: 1  •  OXcarbazepine (TRILEPTAL) 600 MG tablet, Take 1 tablet by mouth 2 (Two) Times a Day. Call to schedule an office visit for further refills.  Thank you, Disp: 60 tablet, Rfl: 0  •  pantoprazole (PROTONIX) 40 MG EC tablet, Take 1 tablet by mouth Daily., Disp: , Rfl:   •  promethazine (PHENERGAN) 25 MG tablet, Take 1 tablet by mouth 2 (Two) Times a Day As Needed., Disp: , Rfl:   •  traZODone (DESYREL) 150 MG tablet, TAKE ONE TABLET BY MOUTH EVERY NIGHT, Disp: 30 tablet, Rfl: 4  •  rosuvastatin (Crestor) 10 MG tablet, Take 1 tablet by mouth Every Night., Disp: 30 tablet, Rfl: 11    Allergies:   Allergies   Allergen Reactions   • Sulfa Antibiotics GI Intolerance       PHQ-9 Total Score:     STEADI Fall Risk Assessment has not been completed.    Objective     Physical Exam:   Physical Exam  Eyes:      General: Lids are normal.      Extraocular Movements: EOM normal. No nystagmus.   Neurological:      Coordination: Romberg sign negative.      Deep Tendon Reflexes:      Reflex Scores:       Bicep reflexes are 2+ on the right side and 2+ on the left side.       Brachioradialis reflexes are 2+ on the right side and 2+ on the left side.       Patellar reflexes are 2+ on the right side and 2+ on the left side.       Achilles reflexes are 2+ on the right side and 2+ on the left side.  Psychiatric:         Speech: Speech normal.         Neurological Exam  Mental Status  Awake, alert and oriented to person, place and time. Recent and remote memory are intact. Speech is normal. Follows three-step commands. Attention and concentration are normal. Fund of knowledge is appropriate for level of education.    Cranial Nerves  CN II: Right visual acuity: Finger movement. Left visual acuity: Finger movement. Right normal visual field. Left normal visual field.  CN III, IV, VI: Extraocular movements intact  "bilaterally. No nystagmus. Normal saccades. Normal lids and orbits bilaterally.   Right pupil: Round.   Left pupil: Round.  CN V: Facial sensation is normal.  CN VII: Full and symmetric facial movement.  CN IX, X: Palate elevates symmetrically  CN XI: Shoulder shrug strength is normal.  CN XII: Tongue midline without atrophy or fasciculations.    Motor  Normal muscle bulk throughout. No fasciculations present. Normal muscle tone. No abnormal involuntary movements. Right pronator drift.  RUE weakness.    Sensory  Sensation is intact to light touch, pinprick, vibration and proprioception in all four extremities.    Reflexes                                            Right                      Left  Brachioradialis                    2+                         2+  Biceps                                 2+                         2+  Patellar                                2+                         2+  Achilles                                2+                         2+    Right pathological reflexes: Ankle clonus absent.  Left pathological reflexes: Ankle clonus absent.    Coordination  Right: Finger-to-nose normal. Rapid alternating movement normal. Heel-to-shin normal.    Gait  Normal casual, toe, heel and tandem gait. Romberg is absent. Able to rise from chair without using arms.     Physical Exam  Neurologic exam was performed.      Vital Signs:   Vitals:    02/26/25 1316   BP: 118/90   Pulse: 84   SpO2: 98%   Weight: 85.1 kg (187 lb 9.6 oz)   Height: 170.2 cm (67\")     Body mass index is 29.38 kg/m².         Assessment / Plan      Assessment/Plan:   Diagnoses and all orders for this visit:    1. History of stroke (Primary)  Comments:  Obtain records. Will likely need to re-start low dose ASA once scans reviewed.  Today resume crestor.  Control BP. FU w cards for CUS, Echo, holter  Orders:  -     CBC Auto Differential; Future  -     Comprehensive Metabolic Panel; Future  -     rosuvastatin (Crestor) 10 MG tablet; " Take 1 tablet by mouth Every Night.  Dispense: 30 tablet; Refill: 11    2. Partial idiopathic epilepsy with seizures of localized onset, intractable, without status epilepticus  Comments:  Cont OXC and GBP. Repeat EEG  Orders:  -     OXcarbazepine (TRILEPTAL) 600 MG tablet; Take 1 tablet by mouth 2 (Two) Times a Day. Call to schedule an office visit for further refills.  Thank you  Dispense: 60 tablet; Refill: 0  -     Carbamazepine Level, Total; Future  -     Gabapentin Level; Future  -     EEG (Hospital Performed); Future    3. Abnormal movements  Comments:  Consider right hemiballismus, vs sz    4. Status post VNS (vagus nerve stimulator) placement    5. Mild recurrent major depression       Assessment & Plan  1. Cerebrovascular accident.  The patient experienced a hemorrhagic stroke 2 weeks ago, resulting in right arm weakness and abnormal movements. Blood pressure was significantly elevated at 198/120 mmHg during the event. The patient was not on any antiplatelet therapy or statin at the time of the stroke. An EEG will be ordered to evaluate for potential focal seizures. The patient is advised to obtain and provide her medical records from her recent hospital stay for further evaluation. She is also advised to inform her cardiologist about the recent stroke to facilitate the repetition of her echocardiogram and Holter monitor tests. A carotid ultrasound will be ordered. The patient will be restarted on Crestor and Plavix, pending review of her medical records. Blood work will be conducted today.    2. Hypertension.  Blood pressure was significantly elevated at 198/120 mmHg during the stroke. The patient is advised to continue monitoring her blood pressure at home and to contact the clinic if it becomes elevated again.    3. Seizure disorder.  The patient is currently on gabapentin 600 mg 4 times a day and oxcarbazepine 600 mg twice a day, which should help manage her seizures. She has not experienced any  typical seizures recently. An EEG will be ordered to evaluate for potential focal seizures.    4. Medication management.  The patient is currently on multiple medications, including amlodipine 2.5 mg, Valium, Cymbalta 30 mg, Pepcid, Flonase, gabapentin 600 mg 4 times a day, hydrochlorothiazide, Norco, Imdur, Lantus, metoprolol 25 mg once a day, and oxcarbazepine 600 mg twice a day. She is advised to continue her current medication regimen. A refill for oxcarbazepine will be provided.    Follow-up  The patient will follow up in 3 months.        Patient Education:   I have instructed and given the patient education on the importance of not driving and operating heavy machinery. No solo bathing or tub baths for 3 months from onset of the most recent seizure.   Minimize stress as much as possible. I have recommended 7-8 hours of sleep each night. Abstain from alcohol intake. Educated on Antiepileptic medications with possible side effects and signs and symptoms to report if prescribed during visit. Instructed to take seizure medication daily if prescribed. Reviewed potential seizure risk factors.   I have instructed the patient to call 911 or our office if another seizure does occur. Patient verbalizes and understands.   Discussed signs and symptoms of stroke and when to call 911. Instructed to follow a low fat diet including the Mediterranean diet. Instructed to take all medications daily as prescribed. Encouraged 30 minutes of exercise 3-4 times a week as tolerated. Stay well hydrated. Discussed potential side effects of new medications and signs and symptoms to report. If patient is currently using tobacco products, smoking cessation was encouraged. Reviewed stroke risk factors and stroke prevention plan. Patient and/or family verbalizes understanding and agrees with plan.         Reviewed medications, potential side effects and signs and symptoms to report. Discussed risk versus benefits of treatment plan with  patient and/or family-including medications, labs and radiology that may be ordered. Addressed questions and concerns during visit. Patient and/or family verbalized understanding and agree with plan. Instructed to call the office with any questions and report to ER with any life-threatening symptoms.     Follow Up:   Return in about 3 months (around 5/26/2025) for Recheck.    During this visit the following were done:  Labs Reviewed [x]    Labs Ordered [x]    Radiology Reports Reviewed []    Radiology Ordered []    PCP Records Reviewed [x]    Referring Provider Records Reviewed []    ER Records Reviewed []    Hospital Records Reviewed []    History Obtained From Family [x]    Radiology Images Reviewed []    Other Reviewed []    Records Requested [x]      Patient or patient representative verbalized consent for the use of Ambient Listening during the visit with  Javier Galan DNP, APRN for chart documentation. 2/26/2025  11:22 EST      Javier Galan DNP, APRN

## 2025-02-27 ENCOUNTER — TELEPHONE (OUTPATIENT)
Dept: NEUROLOGY | Facility: CLINIC | Age: 60
End: 2025-02-27
Payer: MEDICARE

## 2025-02-27 LAB
BASOPHILS # BLD AUTO: 0.05 10*3/MM3 (ref 0–0.2)
BASOPHILS NFR BLD AUTO: 0.5 % (ref 0–1.5)
CARBAMAZEPINE SERPL-MCNC: <2 MCG/ML (ref 4–12)
DEPRECATED RDW RBC AUTO: 37.7 FL (ref 37–54)
EOSINOPHIL # BLD AUTO: 0.08 10*3/MM3 (ref 0–0.4)
EOSINOPHIL NFR BLD AUTO: 0.9 % (ref 0.3–6.2)
ERYTHROCYTE [DISTWIDTH] IN BLOOD BY AUTOMATED COUNT: 11.9 % (ref 12.3–15.4)
HCT VFR BLD AUTO: 41.1 % (ref 34–46.6)
HGB BLD-MCNC: 13.5 G/DL (ref 12–15.9)
IMM GRANULOCYTES # BLD AUTO: 0.05 10*3/MM3 (ref 0–0.05)
IMM GRANULOCYTES NFR BLD AUTO: 0.5 % (ref 0–0.5)
LYMPHOCYTES # BLD AUTO: 2.8 10*3/MM3 (ref 0.7–3.1)
LYMPHOCYTES NFR BLD AUTO: 29.9 % (ref 19.6–45.3)
MCH RBC QN AUTO: 28.4 PG (ref 26.6–33)
MCHC RBC AUTO-ENTMCNC: 32.8 G/DL (ref 31.5–35.7)
MCV RBC AUTO: 86.5 FL (ref 79–97)
MONOCYTES # BLD AUTO: 0.49 10*3/MM3 (ref 0.1–0.9)
MONOCYTES NFR BLD AUTO: 5.2 % (ref 5–12)
NEUTROPHILS NFR BLD AUTO: 5.91 10*3/MM3 (ref 1.7–7)
NEUTROPHILS NFR BLD AUTO: 63 % (ref 42.7–76)
NRBC BLD AUTO-RTO: 0 /100 WBC (ref 0–0.2)
PLATELET # BLD AUTO: 300 10*3/MM3 (ref 140–450)
PMV BLD AUTO: 10.4 FL (ref 6–12)
RBC # BLD AUTO: 4.75 10*6/MM3 (ref 3.77–5.28)
WBC NRBC COR # BLD AUTO: 9.38 10*3/MM3 (ref 3.4–10.8)

## 2025-02-27 NOTE — TELEPHONE ENCOUNTER
Rec'd records from [facility]. Pt did have a left  parietal lobe hemorrhagic stroke 1.7x1.5cm. She has appt w neurosurgery next week with follow up CTH. She has those appts and plans to keep those appts. She is off anti-coagulants until she follows up with NS. Pt verbalized understanding.

## 2025-03-03 ENCOUNTER — TELEPHONE (OUTPATIENT)
Dept: NEUROLOGY | Facility: CLINIC | Age: 60
End: 2025-03-03
Payer: MEDICARE

## 2025-03-03 LAB — GABAPENTIN SERPLBLD-MCNC: 2.5 UG/ML (ref 4–16)

## 2025-03-03 NOTE — TELEPHONE ENCOUNTER
Patient called stating that she is having shaking in her head and legs and that provider stated to call back and let her know how she is doing and may put her on medication for the shaking.

## 2025-03-03 NOTE — TELEPHONE ENCOUNTER
Called and scheduled appointment per provider.    ----- Message from Javier Galan sent at 3/3/2025  4:08 PM EST -----  Regarding: appt  Please schedule her for doxy this Thursday at 8:00am.    Thanks

## 2025-03-03 NOTE — TELEPHONE ENCOUNTER
I called her back. No further large arm movements. Now complains of head and bilat leg tremors. On oxc and GBP.  Will schedule for appt this Thursday at 8:ooam . Doxy.

## 2025-03-04 ENCOUNTER — TELEPHONE (OUTPATIENT)
Dept: NEUROLOGY | Facility: CLINIC | Age: 60
End: 2025-03-04

## 2025-03-04 NOTE — TELEPHONE ENCOUNTER
Pt stated they had a Stroke 3 weeks ago. On Sunday and Monday they woke up light headed, feeling lifeless. BP is 179/99.     Provider was in with a Pt.    GAVIN Arriaga verbally advised Pt to go the the ED based on BP and symptoms.    Pt verbalized understanding and will go to the ED.

## 2025-03-06 ENCOUNTER — TELEPHONE (OUTPATIENT)
Dept: NEUROLOGY | Facility: CLINIC | Age: 60
End: 2025-03-06

## 2025-03-07 ENCOUNTER — TELEPHONE (OUTPATIENT)
Dept: NEUROLOGY | Facility: CLINIC | Age: 60
End: 2025-03-07

## 2025-03-07 ENCOUNTER — TELEMEDICINE (OUTPATIENT)
Dept: NEUROLOGY | Facility: CLINIC | Age: 60
End: 2025-03-07
Payer: MEDICARE

## 2025-03-07 DIAGNOSIS — Z96.89 STATUS POST VNS (VAGUS NERVE STIMULATOR) PLACEMENT: ICD-10-CM

## 2025-03-07 DIAGNOSIS — Z86.73 HISTORY OF STROKE: Primary | ICD-10-CM

## 2025-03-07 DIAGNOSIS — G40.019 PARTIAL IDIOPATHIC EPILEPSY WITH SEIZURES OF LOCALIZED ONSET, INTRACTABLE, WITHOUT STATUS EPILEPTICUS: ICD-10-CM

## 2025-03-07 DIAGNOSIS — R25.9 ABNORMAL MOVEMENTS: ICD-10-CM

## 2025-03-07 RX ORDER — ROSUVASTATIN CALCIUM 10 MG/1
10 TABLET, COATED ORAL DAILY
COMMUNITY

## 2025-03-07 RX ORDER — FAMOTIDINE 20 MG/1
20 TABLET, FILM COATED ORAL DAILY
COMMUNITY

## 2025-03-07 RX ORDER — TRAZODONE HYDROCHLORIDE 150 MG/1
1 TABLET ORAL
COMMUNITY

## 2025-03-07 RX ORDER — CYCLOBENZAPRINE HCL 10 MG
10 TABLET ORAL 3 TIMES DAILY PRN
COMMUNITY
End: 2025-03-07

## 2025-03-07 RX ORDER — CITALOPRAM HYDROBROMIDE 20 MG/1
20 TABLET ORAL DAILY
COMMUNITY

## 2025-03-07 RX ORDER — CLOPIDOGREL BISULFATE 75 MG/1
75 TABLET ORAL DAILY
COMMUNITY

## 2025-03-07 NOTE — PROGRESS NOTES
Neuro Office Visit      Encounter Date: 2025   Patient Name: Phyllis Iyer  : 1965   MRN: [id number]     Chief Complaint:    Chief Complaint   Patient presents with    history of stroke    abnormal movements       History of Present Illness: Phyllis Iyer is a 59 y.o. female who is here today in Neurology for  stroke, abnormal movements.     Mode of Visit: Video  Location of patient: -HOME-  Location of provider: +[facility]+  You have chosen to receive care through a telehealth visit.  The patient has signed the video visit consent form.  The visit included audio and video interaction. No technical issues occurred during this visit.      History of Present Illness     Last visit 2025-obtain records, resume crestor, control bp, FU w cards for CUS, echo and holter. Labs. Cont oxc, GBP, repeat EEG. Consider hemiballismus       Abnormal Movements   Pt called requested appt for head and leg tremor. Today she has no recollection of complaining of these tremors. No tremors seen on video.        Recent stroke  Had FU w NS who cleared her to resume plavix and I had already resumed her crestor.  Denies stroke sympotms. Right arm is still weak. Starts PT next week.  Ambulating well.       Pt did have a left parietal lobe hemorrhagic stroke 1.7x1.5cm.   Two weeks ago, pt had a hemorrhagic stroke from a brain bleed. Had extremely elevated bp. She was compliant with crestor and plavix.  She cannot have MRI  due to VNS. Her symptoms included right arm numbness, tingling and paralysis.  She was admitted and had CTH, CTA H/N. I do not have DC summary or any of the radiology reports.     She complains today of 2 episodes of RUE abnormal movements and brings in a video where she has to catch her right arm with her left hand and hold it down.  Episodes last 10-13 minutes.  Initially, the arm was immobile, but some improvement has been noted. Persistent numbness in the right arm remains, with movement  restricted due to lack of strength rather than pain. No other symptoms such as vision changes, slurred speech, difficulty swallowing, or walking were reported.   States plavix and crestor were discontinued at discharge and she has not re-started these medications.     She denies any current stroke sx. BP has been controlled at home.     Since she saw me last had an MI (data deficient).      No medications were prescribed or changed during the hospital stay. Rehabilitation included walking exercises the following morning. No cardiac tests were completed during the hospital stay. Currently under the care of cardiologist Dr. Quesada, last seen 3 months ago. A CT scan of the neck without contrast was performed. History includes heart attacks and broken heart syndrome, with 35 percent heart function. Previously on Plavix and Crestor, advised to discontinue due to the brain bleed. Crestor has not been taken for some time, and it is unclear if the cardiologist is aware of the absence of a statin. Scheduled to start therapy and a 2-week rehabilitation program in the hospital.            No typical seizures have been experienced recently. VNS is currently turned off. The last seizure occurred in May 2023.                       History of TIA/CVA  She did not get AFO for right foot and sustained Right foot fracture. Now in walking boot. Trying to avoid surgery.  Right hand and right foot is numb and weak. Right foot will dany when walking. She can trip due to foot weakness.  Seen by cardiology and placed on Repatha. Abnml GXT  [facility] 9/1/22 w Dr Wills. Significant 1 vessel disease w high grade stenosis first diagonal of LAD jailed by 2 stents. Mild in-stent stenosis of mid LAD and patent RCA. EF 66%-medical management adding amlodipine.     [facility]  3/30/2022 admitted at [facility] for TIA sx of right sided weakness and numbness.  Describes walking down the [place] and her right leg gave out. Developed RUE and RLE  numbness and weakness. No vision changes, slurred speech or confusion. Symptoms mostly resolved by the time she arrived in ED.  Overall, it was felt her symptoms were consistent with TIA.  CTH-calcified meningioma right frontal lobe 7.5 mm in diameter similar to previous study. No hemorrhage.  CTA head and neck-no stenosis  MRI brain not done due to VNS  Echo-neg bubble study w nml EF 66%  A1c 10.2  Dc'd on lopresser 25 bid, imdur 60, plavix 75, asa 81, Crestor 20, trileptal 600 bid     Seizure Disorderw VNS  VNS turned off  No seizures  Medication: bid w  qid  Compliance:does not miss doses  Side effects:None  Last seizure:5/2023. She's not sure she had a sz but when she woke up her face was numb.  Description:right facial, eye, tongue drawing. Bites her tongue. No loss of bladder or bowel. Usually happens at night.     JOSE  Referred to sleep med. She did not make appt and does not want to go at this time.     Depression  Recently lost her  tragically to arm sepsis. Denies SI.     PMH: CAD with multiple stents, T2DM, HLD, HTN, Sx disorder with VNS, chronic back pain   Subjective      Past Medical History:   Past Medical History:   Diagnosis Date    Arthritis     CAD (coronary artery disease)     Chest tightness or pressure     Complex partial seizure     Diabetes mellitus     Dyslipidemia     FHx: migraine headaches     GERD (gastroesophageal reflux disease)     Hiatal hernia     History of stroke     Hypercholesterolemia     Hypertension     Myocardial infarction     JOSE on CPAP     PONV (postoperative nausea and vomiting)     Stroke syndrome     Stroke syndrome        Past Surgical History:   Past Surgical History:   Procedure Laterality Date    CARDIAC CATHETERIZATION      11 stents    CARDIAC CATHETERIZATION Left 9/1/2022    Procedure: Coronary angiography;  Surgeon: Jose G Wills IV, MD;  Location:  [facility];  Service: Cardiovascular;  Laterality: Left;     CORONARY ANGIOPLASTY WITH STENT PLACEMENT      CORONARY STENT PLACEMENT      x5    IMPLANTATION VAGAL NERVE STIMULATOR      LUMBAR FUSION  07/21/2014    decompression and fusion L4/5 Dr. Palm       Family History:   Family History   Problem Relation Age of Onset    Heart disease Other     Diabetes Other     Cancer Other     Stroke Other     Coronary artery disease Mother     Heart attack Mother     Coronary artery disease Father        Social History:   Social History     Socioeconomic History    Marital status:    Tobacco Use    Smoking status: Former    Smokeless tobacco: Never   Vaping Use    Vaping status: Never Used   Substance and Sexual Activity    Alcohol use: No    Drug use: Defer    Sexual activity: Defer       Medications:     Current Outpatient Medications:     citalopram (CeleXA) 20 MG tablet, Take 1 tablet by mouth Daily., Disp: , Rfl:     clopidogrel (PLAVIX) 75 MG tablet, Take 1 tablet by mouth Daily., Disp: , Rfl:     diazePAM (VALIUM) 10 MG tablet, As Needed. (Patient taking differently: Every 12 (Twelve) Hours As Needed.), Disp: , Rfl:     DULoxetine (CYMBALTA) 30 MG capsule, Take 1 capsule by mouth Daily., Disp: , Rfl:     famotidine (PEPCID) 20 MG tablet, Take 1 tablet by mouth., Disp: , Rfl:     famotidine (PEPCID) 20 MG tablet, Take 1 tablet by mouth Daily., Disp: , Rfl:     gabapentin (NEURONTIN) 600 MG tablet, 4 (Four) Times a Day., Disp: , Rfl:     hydroCHLOROthiazide 12.5 MG tablet, Take 1 tablet by mouth Daily. (Patient taking differently: Take 2 tablets by mouth Daily.), Disp: , Rfl:     HYDROcodone-acetaminophen (NORCO) 7.5-325 MG per tablet, Every 6 (Six) Hours As Needed., Disp: , Rfl:     isosorbide mononitrate (IMDUR) 30 MG 24 hr tablet, Take 1 tablet by mouth Every Morning. NEEDS FOLLOW UP TO CONTINUE REFILLS, Disp: 30 tablet, Rfl: 0    Lantus SoloStar 100 UNIT/ML injection pen, Inject 50 Units under the skin into the appropriate area as directed 2 (Two) Times a Day.  (Patient taking differently: Inject 30 Units under the skin into the appropriate area as directed 2 (Two) Times a Day.), Disp: , Rfl:     metoprolol succinate XL (TOPROL-XL) 25 MG 24 hr tablet, Take 1 tablet by mouth Daily. (Patient taking differently: Take 1 tablet by mouth 2 (Two) Times a Day.), Disp: , Rfl:     nitroglycerin (Nitrostat) 0.4 MG SL tablet, Place 1 tablet under the tongue Every 5 (Five) Minutes As Needed for Chest Pain. prn, Disp: 25 tablet, Rfl: 1    OXcarbazepine (TRILEPTAL) 600 MG tablet, Take 1 tablet by mouth 2 (Two) Times a Day. Call to schedule an office visit for further refills.  Thank you, Disp: 60 tablet, Rfl: 0    pantoprazole (PROTONIX) 40 MG EC tablet, Take 1 tablet by mouth Daily., Disp: , Rfl:     promethazine (PHENERGAN) 25 MG tablet, Take 1 tablet by mouth 2 (Two) Times a Day As Needed., Disp: , Rfl:     rosuvastatin (CRESTOR) 10 MG tablet, Take 1 tablet by mouth Daily., Disp: , Rfl:     traZODone (DESYREL) 150 MG tablet, Take 1 tablet by mouth every night at bedtime., Disp: , Rfl:     BD Pen Needle Sherrill 2nd Gen 32G X 4 MM misc, , Disp: , Rfl:     Allergies:   Allergies   Allergen Reactions    Sulfa Antibiotics GI Intolerance       PHQ-9 Total Score:     STEADI Fall Risk Assessment has not been completed.    Objective     Physical Exam:   Physical Exam  Constitutional:       General: She is not in acute distress.     Appearance: Normal appearance. She is not ill-appearing or toxic-appearing.   HENT:      Head: Normocephalic and atraumatic.      Nose: Nose normal.   Eyes:      General:         Right eye: No discharge.         Left eye: No discharge.      Conjunctiva/sclera: Conjunctivae normal.   Pulmonary:      Effort: Pulmonary effort is normal. No respiratory distress.   Neurological:      General: No focal deficit present.      Mental Status: She is alert and oriented to person, place, and time. Mental status is at baseline.   Psychiatric:         Mood and Affect: Mood normal.          Behavior: Behavior normal.         Thought Content: Thought content normal.         Judgment: Judgment normal.         Neurological Exam  Mental Status  Alert. Oriented to person, place, and time.     Physical Exam        Vital Signs: There were no vitals filed for this visit.  There is no height or weight on file to calculate BMI.         Assessment / Plan      Assessment/Plan:   Diagnoses and all orders for this visit:    1. History of stroke (Primary)  Comments:  Cont plavix and crestor, control bp and glucose    2. Partial idiopathic epilepsy with seizures of localized onset, intractable, without status epilepticus  Comments:  Cont oxc and  GBP    3. Abnormal movements  Comments:  resolved    4. Status post VNS (vagus nerve stimulator) placement       Assessment & Plan          Patient Education:       Reviewed medications, potential side effects and signs and symptoms to report. Discussed risk versus benefits of treatment plan with patient and/or family-including medications, labs and radiology that may be ordered. Addressed questions and concerns during visit. Patient and/or family verbalized understanding and agree with plan. Instructed to call the office with any questions and report to ER with any life-threatening symptoms.     Follow Up:   Return in about 4 months (around 7/7/2025) for Recheck.    During this visit the following were done:  Labs Reviewed [x]    Labs Ordered []    Radiology Reports Reviewed []    Radiology Ordered []    PCP Records Reviewed []    Referring Provider Records Reviewed []    ER Records Reviewed []    Hospital Records Reviewed []    History Obtained From Family []    Radiology Images Reviewed []    Other Reviewed [x]    Records Requested []      Patient or patient representative verbalized consent for the use of Ambient Listening during the visit with  Javier Galan DNP, APRN for chart documentation. 3/7/2025  08:23 EST      Javier Galan DNP, APRN

## 2025-03-07 NOTE — LETTER
2025     Jack Early MD  [address] [place] [id number]    Patient: Phyllis Iyer   YOB: 1965   Date of Visit: 3/7/2025     Dear Jack Early MD:       Thank you for referring Phyllis Iyer to me for evaluation. Below are the relevant portions of my assessment and plan of care.    If you have questions, please do not hesitate to call me. I look forward to following Phyllis along with you.         Sincerely,        Javier Galan DNP, APRN        CC: No Recipients    Javier Galan DNP, APRN  [id number] [phone]  Signed     Neuro Office Visit      Encounter Date: 2025   Patient Name: Phyllis Iyer  : 1965   MRN: [id number]     Chief Complaint:    Chief Complaint   Patient presents with   • history of stroke   • abnormal movements       History of Present Illness: Phyllis Iyer is a 59 y.o. female who is here today in Neurology for  stroke, abnormal movements.     Mode of Visit: Video  Location of patient: -HOME-  Location of provider: +[facility]+  You have chosen to receive care through a telehealth visit.  The patient has signed the video visit consent form.  The visit included audio and video interaction. No technical issues occurred during this visit.      History of Present Illness     Last visit 2025-obtain records, resume crestor, control bp, FU w cards for CUS, echo and holter. Labs. Cont oxc, GBP, repeat EEG. Consider hemiballismus       Abnormal Movements   Pt called requested appt for head and leg tremor. Today she has no recollection of complaining of these tremors. No tremors seen on video.        Recent stroke  Had FU w NS who cleared her to resume plavix and I had already resumed her crestor.  Denies stroke sympotms. Right arm is still weak. Starts PT next week.  Ambulating well.       Pt did have a left parietal lobe hemorrhagic stroke 1.7x1.5cm.   Two weeks ago, pt had a hemorrhagic stroke from a brain bleed. Had  extremely elevated bp. She was compliant with crestor and plavix.  She cannot have MRI  due to VNS. Her symptoms included right arm numbness, tingling and paralysis.  She was admitted and had CTH, CTA H/N. I do not have DC summary or any of the radiology reports.     She complains today of 2 episodes of RUE abnormal movements and brings in a video where she has to catch her right arm with her left hand and hold it down.  Episodes last 10-13 minutes.  Initially, the arm was immobile, but some improvement has been noted. Persistent numbness in the right arm remains, with movement restricted due to lack of strength rather than pain. No other symptoms such as vision changes, slurred speech, difficulty swallowing, or walking were reported.   States plavix and crestor were discontinued at discharge and she has not re-started these medications.     She denies any current stroke sx. BP has been controlled at home.     Since she saw me last had an MI (data deficient).      No medications were prescribed or changed during the hospital stay. Rehabilitation included walking exercises the following morning. No cardiac tests were completed during the hospital stay. Currently under the care of cardiologist Dr. Quesada, last seen 3 months ago. A CT scan of the neck without contrast was performed. History includes heart attacks and broken heart syndrome, with 35 percent heart function. Previously on Plavix and Crestor, advised to discontinue due to the brain bleed. Crestor has not been taken for some time, and it is unclear if the cardiologist is aware of the absence of a statin. Scheduled to start therapy and a 2-week rehabilitation program in the hospital.            No typical seizures have been experienced recently. VNS is currently turned off. The last seizure occurred in May 2023.                       History of TIA/CVA  She did not get AFO for right foot and sustained Right foot fracture. Now in walking boot. Trying to avoid  surgery.  Right hand and right foot is numb and weak. Right foot will dany when walking. She can trip due to foot weakness.  Seen by cardiology and placed on Repatha. Abnml GXT  [facility] 9/1/22 w Dr Wills. Significant 1 vessel disease w high grade stenosis first diagonal of LAD jailed by 2 stents. Mild in-stent stenosis of mid LAD and patent RCA. EF 66%-medical management adding amlodipine.     PH  3/30/2022 admitted at [facility] for TIA sx of right sided weakness and numbness.  Describes walking down the [place] and her right leg gave out. Developed RUE and RLE numbness and weakness. No vision changes, slurred speech or confusion. Symptoms mostly resolved by the time she arrived in ED.  Overall, it was felt her symptoms were consistent with TIA.  CTH-calcified meningioma right frontal lobe 7.5 mm in diameter similar to previous study. No hemorrhage.  CTA head and neck-no stenosis  MRI brain not done due to VNS  Echo-neg bubble study w nml EF 66%  A1c 10.2  Dc'd on lopresser 25 bid, imdur 60, plavix 75, asa 81, Crestor 20, trileptal 600 bid     Seizure Disorderw VNS  VNS turned off  No seizures  Medication: bid w  qid  Compliance:does not miss doses  Side effects:None  Last seizure:5/2023. She's not sure she had a sz but when she woke up her face was numb.  Description:right facial, eye, tongue drawing. Bites her tongue. No loss of bladder or bowel. Usually happens at night.     JOSE  Referred to sleep med. She did not make appt and does not want to go at this time.     Depression  Recently lost her  tragically to arm sepsis. Denies SI.     PMH: CAD with multiple stents, T2DM, HLD, HTN, Sx disorder with VNS, chronic back pain   Subjective      Past Medical History:   Past Medical History:   Diagnosis Date   • Arthritis    • CAD (coronary artery disease)    • Chest tightness or pressure    • Complex partial seizure    • Diabetes mellitus    • Dyslipidemia    • FHx: migraine headaches     • GERD (gastroesophageal reflux disease)    • Hiatal hernia    • History of stroke    • Hypercholesterolemia    • Hypertension    • Myocardial infarction    • JOSE on CPAP    • PONV (postoperative nausea and vomiting)    • Stroke syndrome    • Stroke syndrome        Past Surgical History:   Past Surgical History:   Procedure Laterality Date   • CARDIAC CATHETERIZATION      11 stents   • CARDIAC CATHETERIZATION Left 9/1/2022    Procedure: Coronary angiography;  Surgeon: Jose G Wills IV, MD;  Location: [facility] INVASIVE LOCATION;  Service: Cardiovascular;  Laterality: Left;   • CORONARY ANGIOPLASTY WITH STENT PLACEMENT     • CORONARY STENT PLACEMENT      x5   • IMPLANTATION VAGAL NERVE STIMULATOR     • LUMBAR FUSION  07/21/2014    decompression and fusion L4/5 Dr. Palm       Family History:   Family History   Problem Relation Age of Onset   • Heart disease Other    • Diabetes Other    • Cancer Other    • Stroke Other    • Coronary artery disease Mother    • Heart attack Mother    • Coronary artery disease Father        Social History:   Social History     Socioeconomic History   • Marital status:    Tobacco Use   • Smoking status: Former   • Smokeless tobacco: Never   Vaping Use   • Vaping status: Never Used   Substance and Sexual Activity   • Alcohol use: No   • Drug use: Defer   • Sexual activity: Defer       Medications:     Current Outpatient Medications:   •  citalopram (CeleXA) 20 MG tablet, Take 1 tablet by mouth Daily., Disp: , Rfl:   •  clopidogrel (PLAVIX) 75 MG tablet, Take 1 tablet by mouth Daily., Disp: , Rfl:   •  diazePAM (VALIUM) 10 MG tablet, As Needed. (Patient taking differently: Every 12 (Twelve) Hours As Needed.), Disp: , Rfl:   •  DULoxetine (CYMBALTA) 30 MG capsule, Take 1 capsule by mouth Daily., Disp: , Rfl:   •  famotidine (PEPCID) 20 MG tablet, Take 1 tablet by mouth., Disp: , Rfl:   •  famotidine (PEPCID) 20 MG tablet, Take 1 tablet by mouth Daily., Disp: , Rfl:    •  gabapentin (NEURONTIN) 600 MG tablet, 4 (Four) Times a Day., Disp: , Rfl:   •  hydroCHLOROthiazide 12.5 MG tablet, Take 1 tablet by mouth Daily. (Patient taking differently: Take 2 tablets by mouth Daily.), Disp: , Rfl:   •  HYDROcodone-acetaminophen (NORCO) 7.5-325 MG per tablet, Every 6 (Six) Hours As Needed., Disp: , Rfl:   •  isosorbide mononitrate (IMDUR) 30 MG 24 hr tablet, Take 1 tablet by mouth Every Morning. NEEDS FOLLOW UP TO CONTINUE REFILLS, Disp: 30 tablet, Rfl: 0  •  Lantus SoloStar 100 UNIT/ML injection pen, Inject 50 Units under the skin into the appropriate area as directed 2 (Two) Times a Day. (Patient taking differently: Inject 30 Units under the skin into the appropriate area as directed 2 (Two) Times a Day.), Disp: , Rfl:   •  metoprolol succinate XL (TOPROL-XL) 25 MG 24 hr tablet, Take 1 tablet by mouth Daily. (Patient taking differently: Take 1 tablet by mouth 2 (Two) Times a Day.), Disp: , Rfl:   •  nitroglycerin (Nitrostat) 0.4 MG SL tablet, Place 1 tablet under the tongue Every 5 (Five) Minutes As Needed for Chest Pain. prn, Disp: 25 tablet, Rfl: 1  •  OXcarbazepine (TRILEPTAL) 600 MG tablet, Take 1 tablet by mouth 2 (Two) Times a Day. Call to schedule an office visit for further refills.  Thank you, Disp: 60 tablet, Rfl: 0  •  pantoprazole (PROTONIX) 40 MG EC tablet, Take 1 tablet by mouth Daily., Disp: , Rfl:   •  promethazine (PHENERGAN) 25 MG tablet, Take 1 tablet by mouth 2 (Two) Times a Day As Needed., Disp: , Rfl:   •  rosuvastatin (CRESTOR) 10 MG tablet, Take 1 tablet by mouth Daily., Disp: , Rfl:   •  traZODone (DESYREL) 150 MG tablet, Take 1 tablet by mouth every night at bedtime., Disp: , Rfl:   •  BD Pen Needle Sherrill 2nd Gen 32G X 4 MM misc, , Disp: , Rfl:     Allergies:   Allergies   Allergen Reactions   • Sulfa Antibiotics GI Intolerance       PHQ-9 Total Score:     STEADI Fall Risk Assessment has not been completed.    Objective     Physical Exam:   Physical  Exam  Constitutional:       General: She is not in acute distress.     Appearance: Normal appearance. She is not ill-appearing or toxic-appearing.   HENT:      Head: Normocephalic and atraumatic.      Nose: Nose normal.   Eyes:      General:         Right eye: No discharge.         Left eye: No discharge.      Conjunctiva/sclera: Conjunctivae normal.   Pulmonary:      Effort: Pulmonary effort is normal. No respiratory distress.   Neurological:      General: No focal deficit present.      Mental Status: She is alert and oriented to person, place, and time. Mental status is at baseline.   Psychiatric:         Mood and Affect: Mood normal.         Behavior: Behavior normal.         Thought Content: Thought content normal.         Judgment: Judgment normal.         Neurological Exam  Mental Status  Alert. Oriented to person, place, and time.     Physical Exam        Vital Signs: There were no vitals filed for this visit.  There is no height or weight on file to calculate BMI.         Assessment / Plan      Assessment/Plan:   Diagnoses and all orders for this visit:    1. History of stroke (Primary)  Comments:  Cont plavix and crestor, control bp and glucose    2. Partial idiopathic epilepsy with seizures of localized onset, intractable, without status epilepticus  Comments:  Cont oxc and  GBP    3. Abnormal movements  Comments:  resolved    4. Status post VNS (vagus nerve stimulator) placement       Assessment & Plan          Patient Education:       Reviewed medications, potential side effects and signs and symptoms to report. Discussed risk versus benefits of treatment plan with patient and/or family-including medications, labs and radiology that may be ordered. Addressed questions and concerns during visit. Patient and/or family verbalized understanding and agree with plan. Instructed to call the office with any questions and report to ER with any life-threatening symptoms.     Follow Up:   Return in about 4 months  (around 7/7/2025) for Recheck.    During this visit the following were done:  Labs Reviewed [x]    Labs Ordered []    Radiology Reports Reviewed []    Radiology Ordered []    PCP Records Reviewed []    Referring Provider Records Reviewed []    ER Records Reviewed []    Hospital Records Reviewed []    History Obtained From Family []    Radiology Images Reviewed []    Other Reviewed [x]    Records Requested []      Patient or patient representative verbalized consent for the use of Ambient Listening during the visit with  Javier Galan DNP, APRN for chart documentation. 3/7/2025  08:23 EST      Javier Galan DNP, APRN

## 2025-03-07 NOTE — TELEPHONE ENCOUNTER
Called patient to schedule appointment per provider and she already had a three month follow up appointment and wanted to keep that appointment instead of the four month appointment.

## 2025-03-26 ENCOUNTER — TELEPHONE (OUTPATIENT)
Dept: NEUROLOGY | Facility: CLINIC | Age: 60
End: 2025-03-26
Payer: MEDICARE

## 2025-03-26 DIAGNOSIS — R25.1 TREMOR: Primary | ICD-10-CM

## 2025-03-26 NOTE — TELEPHONE ENCOUNTER
PATIENT CALLING TO ADVISE OVER THE LAST TWO WEEKS TREMOR HAS INCREASED.    SHE SAYS SHE WAS TOLD TO CALL IN IF THIS HAPPENED FOR SOME MEDICATION    PLEASE ADVISE PATIENT        I called pt. Complains of slight int tremor of right arm. Taking metoprolol, GBP and OXC. Will add small dose of C/L. Await EEG and follow up in office.     Patient : Aroldo Verma Age: 54 year old Sex: male   MRN: [id number] Encounter Date: 2023      History     Chief Complaint   Patient presents with   • Fever     HPI     Patient is a 54-year-old male with a history of hypothyroidism who presents with fever for 3 days.  At the end of a wedding on Saturday night, patient developed chills.  He awoke with fever and chills.  COVID test was negative. States he has had some indigestion. Has difficulty describing this sensation. Feels like he wants to take some Rolaids. Denies abdominal pain, nausea, vomiting, chest pain, shortness of breath, cough, sore throat.  Stools are slightly loose.  No dysuria or hematuria.  No sick contacts.  Patient has chronic hoarse voice that is unchanged.  Patient has taken Tylenol.  Last dose was this morning.    No Known Allergies    Current Discharge Medication List      Prior to Admission Medications    Details   Multiple Vitamins-Minerals (MULTIVITAMIN ADULTS 50+ PO)       omeprazole (PriLOSEC) 40 MG capsule Take 1 capsule by mouth daily.  Qty: 90 capsule, Refills: 3      levothyroxine 125 MCG tablet Take 1 tablet by mouth daily.  Qty: 90 tablet, Refills: 3             Past Medical History:   Diagnosis Date   • GERD (gastroesophageal reflux disease)    • Thyroid disease     hypothyroid       Past Surgical History:   Procedure Laterality Date   • ANTERIOR CRUCIATE LIGAMENT REPAIR     • HERNIA REPAIR     • REMOVE TONSILS/ADENOIDS,<13 Y/O         Family History   Problem Relation Age of Onset   • Depression Mother    • COPD Mother    • Memory loss Mother    • Cancer, Lung Father    • Depression Sister    • Early death Brother          at 55    • Depression Brother    • Diabetes Brother    • Hyperlipidemia Brother        Social History     Tobacco Use   • Smoking status: Never   • Smokeless tobacco: Never   Vaping Use   • Vaping Use: never used   Substance Use Topics   • Alcohol use: Yes     Alcohol/week: 3.0 - 4.0 standard drinks  of alcohol     Types: 3 - 4 Standard drinks or equivalent per week   • Drug use: Not Currently       E-cigarette/Vaping   • E-Cigarette/Vaping Use Never Used      E-Cigarette/Vaping Substances & Devices         Physical Exam     ED Triage Vitals [06/27/23 2139]   ED Triage Vitals Group      Temp (!) 101.4 °F (38.6 °C)      Heart Rate 83      Resp 20      /75      SpO2 95 %      EtCO2 mmHg       Height       Weight 201 lb 15.1 oz (91.6 kg)      Weight Scale Used Scale in bed      BMI (Calculated)       IBW/kg (Calculated)        Physical Exam  Vitals and nursing note reviewed.   Constitutional:       General: He is not in acute distress.     Appearance: He is well-developed.   HENT:      Head: Normocephalic and atraumatic.      Mouth/Throat:      Comments: Pt has no trismus, uvula is midline. No posterior oropharyngeal erythema, edema or exudates. No peritonsillar abscess. Able to tolerate laying supine, protecting airway and tolerating secretions, speaking in full sentences. Full ROM of neck intact.  Eyes:      Conjunctiva/sclera: Conjunctivae normal.      Pupils: Pupils are equal, round, and reactive to light.   Cardiovascular:      Rate and Rhythm: Normal rate and regular rhythm.      Heart sounds: No murmur heard.  Pulmonary:      Effort: Pulmonary effort is normal. No respiratory distress.      Breath sounds: Normal breath sounds. No wheezing, rhonchi or rales.   Abdominal:      General: Abdomen is flat. There is no distension.      Palpations: Abdomen is soft.      Tenderness: There is no abdominal tenderness. There is no guarding or rebound.   Musculoskeletal:         General: Normal range of motion.      Cervical back: Normal range of motion.      Right lower leg: No edema.      Left lower leg: No edema.   Skin:     General: Skin is warm and dry.   Neurological:      Mental Status: He is alert and oriented to person, place, and time.      Comments: Face is symmetric, normal speech, symmetrical movements  of bilateral upper and lower extremities against gravity, sensation intact         ED Course     Procedures    Lab Results     Results for orders placed or performed during the hospital encounter of 06/27/23   COVID/Flu/RSV panel   Result Value Ref Range    Rapid SARS-COV-2 by PCR Not Detected Not Detected / Detected / Presumptive Positive / Inhibitors present    Influenza A by PCR Not Detected Not Detected    Influenza B by PCR Not Detected Not Detected    RSV BY PCR Not Detected Not Detected    Isolation Guidelines      Procedural Comment     Comprehensive Metabolic Panel   Result Value Ref Range    Fasting Status      Sodium 134 (L) 135 - 145 mmol/L    Potassium 3.7 3.4 - 5.1 mmol/L    Chloride 99 97 - 110 mmol/L    Carbon Dioxide 26 21 - 32 mmol/L    Anion Gap 13 7 - 19 mmol/L    Glucose 118 (H) 70 - 99 mg/dL    BUN 15 6 - 20 mg/dL    Creatinine 0.88 0.67 - 1.17 mg/dL    Glomerular Filtration Rate >90 >=60    BUN/Cr 17 7 - 25    Calcium 8.4 8.4 - 10.2 mg/dL    Bilirubin, Total 0.6 0.2 - 1.0 mg/dL    GOT/AST 70 (H) <=37 Units/L    GPT/ (H) <64 Units/L    Alkaline Phosphatase 73 45 - 117 Units/L    Albumin 3.2 (L) 3.6 - 5.1 g/dL    Protein, Total 7.1 6.4 - 8.2 g/dL    Globulin 3.9 2.0 - 4.0 g/dL    A/G Ratio 0.8 (L) 1.0 - 2.4   Procalcitonin   Result Value Ref Range    Procalcitonin 0.55 (H) <=0.09 ng/mL   Prothrombin Time   Result Value Ref Range    Prothrombin Time 10.8 9.7 - 11.8 sec    INR 1.0     Partial Thromboplastin Time   Result Value Ref Range    PTT 34 (H) 22 - 30 sec   Urinalysis & Reflex Microscopy With Culture If Indicated   Result Value Ref Range    COLOR, URINALYSIS Straw     APPEARANCE, URINALYSIS Clear     GLUCOSE, URINALYSIS Negative Negative mg/dL    BILIRUBIN, URINALYSIS Negative Negative    KETONES, URINALYSIS Negative Negative mg/dL    SPECIFIC GRAVITY, URINALYSIS 1.009 1.005 - 1.030    OCCULT BLOOD, URINALYSIS Large (A) Negative    PH, URINALYSIS 5.5 5.0 - 7.0    PROTEIN, URINALYSIS  30 (A) Negative mg/dL    UROBILINOGEN, URINALYSIS 0.2 0.2, 1.0 mg/dL    NITRITE, URINALYSIS Negative Negative    LEUKOCYTE ESTERASE, URINALYSIS Negative Negative    SQUAMOUS EPITHELIAL, URINALYSIS None Seen None Seen, 1 to 5 /hpf    ERYTHROCYTES, URINALYSIS 11 to 25 (A) None Seen, 1 to 2 /hpf    LEUKOCYTES, URINALYSIS 1 to 5 None Seen, 1 to 5 /hpf    BACTERIA, URINALYSIS None Seen None Seen /hpf    HYALINE CASTS, URINALYSIS None Seen None Seen, 1 to 5 /lpf   CBC with Automated Differential (performable only)   Result Value Ref Range    WBC 5.4 4.2 - 11.0 K/mcL    RBC 4.10 (L) 4.50 - 5.90 mil/mcL    HGB 12.9 (L) 13.0 - 17.0 g/dL    HCT 36.9 (L) 39.0 - 51.0 %    MCV 90.0 78.0 - 100.0 fl    MCH 31.5 26.0 - 34.0 pg    MCHC 35.0 32.0 - 36.5 g/dL    RDW-CV 12.0 11.0 - 15.0 %    RDW-SD 39.5 39.0 - 50.0 fL     140 - 450 K/mcL    NRBC 0 <=0 /100 WBC    Neutrophil, Percent 73 %    Lymphocytes, Percent 19 %    Mono, Percent 8 %    Eosinophils, Percent 0 %    Basophils, Percent 0 %    Immature Granulocytes 0 %    Absolute Neutrophils 3.9 1.8 - 7.7 K/mcL    Absolute Lymphocytes 1.1 1.0 - 4.0 K/mcL    Absolute Monocytes 0.4 0.3 - 0.9 K/mcL    Absolute Eosinophils  0.0 0.0 - 0.5 K/mcL    Absolute Basophils 0.0 0.0 - 0.3 K/mcL    Absolute Immature Granulocytes 0.0 0.0 - 0.2 K/mcL   Lactic Acid, Venous   Result Value Ref Range    Lactate, Venous 0.4 0.0 - 2.0 mmol/L   Streptococcus group A PCR    Specimen: Throat; Swab   Result Value Ref Range    STREPTOCOCCUS GROUP A PCR Not Detected Not Detected       EKG Results     EKG Interpretation  Rate: 80  Rhythm: normal sinus rhythm   Abnormality: No ST elevation or depression, normal WI and QTc intervals, normal QRS    EKG tracing interpreted by ED physician    Radiology Results     Imaging Results          XR CHEST PA OR AP 1 VIEW (Final result)  Result time 06/27/23 22:25:27    Final result                 Impression:    IMPRESSION:    Unremarkable study.        Electronically  Signed by: Crispin Holt MD   Signed on: 6/27/2023 10:25 PM   Workstation ID: [id number]             Narrative:    XR CHEST AP OR PA    INDICATION:  Fever    COMPARISON: None.    FINDINGS:    Overlying EKG leads noted.    No consolidations, pneumothoraces, or pleural effusions.    Heart size is grossly normal. Aortic arch calcifications.    Subcutaneous soft tissues are grossly unremarkable.    No acute osseous abnormalities.                                ED Medication Orders (From admission, onward)    Ordered Start     Status Ordering Provider    06/27/23 2151 06/27/23 2200  lactated ringers bolus 1,000 mL  ONCE         Last MAR action: Completed GREGSHRAVANJUAN LONG    06/27/23 2151 06/27/23 2200  acetaminophen (TYLENOL) tablet 1,000 mg  ONCE         Last MAR action: Given JUAN LEPE               MDM     Patient is a 54-year-old male who presents with fever.  Upon arrival to the emergency department, his temperature is 101.4° F.  Other vital signs are normal.  He is well-appearing, nontoxic.  Breathing comfortably on room air with normal oxygen saturation.  Lung sounds are clear.  He has no findings of strep pharyngitis.  Voice is hoarse which he states is chronic.  Abdomen soft and nontender.  Nursing notes and prior medical records were reviewed, and there is no relevant information beyond that already obtained from history and physical exam.  Differential diagnoses were considered in the evaluation of this patient.  Sepsis workup initiated.  He was given Tylenol and 1 L of IV fluids.  Labs were reviewed.  His white blood cell count is normal.  Slight elevation in procalcitonin of 0.55.  AST and ALT are minimally elevated.  This may be due to underlying viral infection.  Urinalysis with hematuria, no UTI. Patient informed of this finding. Will refer to urology.   No lactic acidosis.   Strep negative. Viral panel negative.   Chest XR without pneumonia.   Patient was reassessed.  He is well-appearing.  Feels  much better after receiving Tylenol.  Temperature is 99.7° now.  Suspect he has a viral syndrome.  Patient will monitor his symptoms at home.  Encouraged hydration.  We discussed reasons to the return to the emergency department.  He will follow-up with his primary care doctor in 2-3 days if he still has a fever.  Discharged home.    Clinical Impression     ED Diagnosis   1. Fever, unspecified fever cause        2. Asymptomatic microscopic hematuria  SERVICE TO UROLOGY          Disposition        Discharge 6/27/2023 11:53 PM  Aroldo Verma discharge to home/self care.           Isabel Contreras,   06/27/23 [id number]

## 2025-03-27 RX ORDER — CARBIDOPA/LEVODOPA 10MG-100MG
1 TABLET ORAL 2 TIMES DAILY
Qty: 60 TABLET | Refills: 2 | Status: SHIPPED | OUTPATIENT
Start: 2025-03-27

## 2025-04-16 DIAGNOSIS — G40.019 PARTIAL IDIOPATHIC EPILEPSY WITH SEIZURES OF LOCALIZED ONSET, INTRACTABLE, WITHOUT STATUS EPILEPTICUS: ICD-10-CM

## 2025-04-16 RX ORDER — OXCARBAZEPINE 600 MG/1
600 TABLET, FILM COATED ORAL 2 TIMES DAILY
Qty: 60 TABLET | Refills: 1 | Status: SHIPPED | OUTPATIENT
Start: 2025-04-16

## 2025-04-16 NOTE — TELEPHONE ENCOUNTER
Rx Refill Note  Requested Prescriptions     Pending Prescriptions Disp Refills    OXcarbazepine (TRILEPTAL) 600 MG tablet [Pharmacy Med Name: OXCARBAZEPINE 600 MG TABLET] 60 tablet 0     Sig: TAKE 1 TABLET BY MOUTH TWICE DAILY MUST MAKE APPOINTMENT FOR MORE REFILLS      Last filled: 2/26/25, 60 with 0 refills  Last office visit with prescribing clinician: 2/26/2025      Next office visit with prescribing clinician: 6/2/2025     Lyndsay Hayes MA  04/16/25, 14:47 EDT

## 2025-05-08 ENCOUNTER — RESULTS FOLLOW-UP (OUTPATIENT)
Dept: NEUROLOGY | Facility: CLINIC | Age: 60
End: 2025-05-08
Payer: MEDICARE

## 2025-05-08 ENCOUNTER — HOSPITAL ENCOUNTER (OUTPATIENT)
Dept: NEUROLOGY | Facility: HOSPITAL | Age: 60
Discharge: HOME | End: 2025-05-08
Admitting: NURSE PRACTITIONER
Payer: MEDICARE

## 2025-05-08 DIAGNOSIS — G40.019 PARTIAL IDIOPATHIC EPILEPSY WITH SEIZURES OF LOCALIZED ONSET, INTRACTABLE, WITHOUT STATUS EPILEPTICUS: ICD-10-CM

## 2025-05-08 PROCEDURE — 95813 EEG EXTND MNTR 61-119 MIN: CPT

## 2025-07-07 DIAGNOSIS — G40.019 PARTIAL IDIOPATHIC EPILEPSY WITH SEIZURES OF LOCALIZED ONSET, INTRACTABLE, WITHOUT STATUS EPILEPTICUS: ICD-10-CM

## 2025-07-07 RX ORDER — OXCARBAZEPINE 600 MG/1
TABLET, FILM COATED ORAL
Qty: 60 TABLET | Refills: 0 | Status: SHIPPED | OUTPATIENT
Start: 2025-07-07

## 2025-07-07 NOTE — TELEPHONE ENCOUNTER
Rx Refill Note  Requested Prescriptions     Pending Prescriptions Disp Refills    OXcarbazepine (TRILEPTAL) 600 MG tablet [Pharmacy Med Name: OXCARBAZEPINE 600 MG TABLET] 60 tablet 0     Sig: TAKE 1 TABLET BY MOUTH 2 (TWO) TIMES A DAY. *CALL TO SCHEDULE AN OFFICE VISIT FOR FURTHER REFILLS.*      Last filled: 4/16/25, 60 with 1 refill  Last office visit with prescribing clinician: 2/26/2025      Next office visit with prescribing clinician: 7/17/2025     Lyndsay Hayes MA  07/07/25, 17:26 EDT

## 2025-07-16 DIAGNOSIS — R25.1 TREMOR: ICD-10-CM

## 2025-07-16 RX ORDER — CARBIDOPA/LEVODOPA 10MG-100MG
1 TABLET ORAL EVERY 12 HOURS SCHEDULED
Qty: 60 TABLET | Refills: 0 | Status: SHIPPED | OUTPATIENT
Start: 2025-07-16 | End: 2025-07-17

## 2025-07-16 NOTE — TELEPHONE ENCOUNTER
Rx Refill Note  Requested Prescriptions     Pending Prescriptions Disp Refills    carbidopa-levodopa (SINEMET)  MG per tablet [Pharmacy Med Name: CARBIDOPA-LEVODOPA  TAB] 60 tablet 0     Sig: take 1 tablet by mouth 2 (two) times a day.      Last filled: 3/27/25 60 with 2 refills.  Last office visit with prescribing clinician: 2/26/2025      Next office visit with prescribing clinician: 7/17/2025     Sent in 60 with 0 refills.    Ally Mcghee MA  07/16/25, 09:45 EDT

## 2025-07-17 ENCOUNTER — OFFICE VISIT (OUTPATIENT)
Dept: NEUROLOGY | Facility: CLINIC | Age: 60
End: 2025-07-17
Payer: MEDICARE

## 2025-07-17 VITALS
OXYGEN SATURATION: 97 % | SYSTOLIC BLOOD PRESSURE: 116 MMHG | HEART RATE: 72 BPM | HEIGHT: 67 IN | BODY MASS INDEX: 28.5 KG/M2 | WEIGHT: 181.6 LBS | DIASTOLIC BLOOD PRESSURE: 80 MMHG

## 2025-07-17 DIAGNOSIS — R25.1 TREMOR: Primary | ICD-10-CM

## 2025-07-17 DIAGNOSIS — G40.019 PARTIAL IDIOPATHIC EPILEPSY WITH SEIZURES OF LOCALIZED ONSET, INTRACTABLE, WITHOUT STATUS EPILEPTICUS: ICD-10-CM

## 2025-07-17 DIAGNOSIS — Z86.73 HISTORY OF STROKE: ICD-10-CM

## 2025-07-17 DIAGNOSIS — F33.0 MILD RECURRENT MAJOR DEPRESSION: ICD-10-CM

## 2025-07-17 DIAGNOSIS — R25.9 ABNORMAL MOVEMENTS: ICD-10-CM

## 2025-07-17 PROCEDURE — 99214 OFFICE O/P EST MOD 30 MIN: CPT | Performed by: NURSE PRACTITIONER

## 2025-07-17 PROCEDURE — 3074F SYST BP LT 130 MM HG: CPT | Performed by: NURSE PRACTITIONER

## 2025-07-17 PROCEDURE — 1160F RVW MEDS BY RX/DR IN RCRD: CPT | Performed by: NURSE PRACTITIONER

## 2025-07-17 PROCEDURE — 1159F MED LIST DOCD IN RCRD: CPT | Performed by: NURSE PRACTITIONER

## 2025-07-17 PROCEDURE — 3079F DIAST BP 80-89 MM HG: CPT | Performed by: NURSE PRACTITIONER

## 2025-07-17 RX ORDER — FLUTICASONE PROPIONATE 50 MCG
SPRAY, SUSPENSION (ML) NASAL
COMMUNITY
End: 2025-07-17

## 2025-07-17 RX ORDER — AMOXICILLIN 875 MG/1
875 TABLET, COATED ORAL 2 TIMES DAILY
COMMUNITY
Start: 2025-07-09 | End: 2025-07-17

## 2025-07-17 RX ORDER — HYDROCHLOROTHIAZIDE 25 MG/1
25 TABLET ORAL DAILY
COMMUNITY

## 2025-07-17 RX ORDER — FAMOTIDINE 40 MG/1
40 TABLET, FILM COATED ORAL DAILY
COMMUNITY
Start: 2025-05-27

## 2025-07-17 RX ORDER — AMLODIPINE BESYLATE 2.5 MG/1
2.5 TABLET ORAL DAILY
COMMUNITY

## 2025-07-17 RX ORDER — DIAZEPAM 5 MG/1
5 TABLET ORAL AS NEEDED
COMMUNITY
End: 2025-07-17

## 2025-07-17 RX ORDER — OXCARBAZEPINE 600 MG/1
600 TABLET, FILM COATED ORAL 2 TIMES DAILY
Qty: 180 TABLET | Refills: 3 | Status: SHIPPED | OUTPATIENT
Start: 2025-07-17

## 2025-07-17 RX ORDER — LORATADINE 10 MG/1
10 TABLET ORAL DAILY
COMMUNITY
End: 2025-07-17

## 2025-07-17 RX ORDER — TIRZEPATIDE 10 MG/.5ML
10 INJECTION, SOLUTION SUBCUTANEOUS WEEKLY
COMMUNITY
Start: 2025-05-07

## 2025-07-17 RX ORDER — ASPIRIN 81 MG/1
81 TABLET ORAL DAILY
COMMUNITY
Start: 2025-05-06

## 2025-07-17 RX ORDER — METOPROLOL TARTRATE 25 MG/1
25 TABLET, FILM COATED ORAL
COMMUNITY
Start: 2025-07-16

## 2025-07-17 NOTE — LETTER
2025     Jack Early MD  [address] [place] [id number]    Patient: Phyllis Iyer   YOB: 1965   Date of Visit: 2025     Dear Jack Early MD:       Thank you for referring Phyllis Iyer to me for evaluation. Below are the relevant portions of my assessment and plan of care.    If you have questions, please do not hesitate to call me. I look forward to following Phyllis along with you.         Sincerely,        Javier Galan DNP, APRN        CC: No Recipients    Javier Galan DNP, APRN  [id number]  Signed     Neuro Office Visit      Encounter Date: 2025   Patient Name: Phyllis Iyer  : 1965   MRN: [id number]   PCP: DR Early  Chief Complaint:    Chief Complaint   Patient presents with   • Stroke       History of Present Illness: Phyllis Iyer is a 60 y.o. female who is here today in Neurology for  history of stroke, seizure disorder, abnormal movements, status post VNS    Last visit 3/7/2025 -telemedicine visit-cont plavix, crestor, control bp and glucose, cont OXC and GBP. Abnormal movements resolved.  EEG (Hospital Performed) (2025 12:48)-neg    History of Present Illness  The patient presents for evaluation of tremors, neuropathy, and sleep apnea.    Tremor  She reports experiencing minor tremors in both hands, particularly noticeable when she is at rest. These tremors do not interfere with daily activities such as eating, dressing, or bathing. She has been taking carbidopa-levodopa 10/100 mg twice daily without missing any doses but does not find it significantly helpful. She reports no hallucinations, acting out dreams, difficulty swallowing, trouble turning over in bed, or falls. She also mentions that her handwriting has always been sloppy and is unsure if it has changed recently. She recalls an incident where she was informed of a brain bleed due to high blood pressure and elevated blood sugar levels, which  resulted in temporary immobility of her arm.    She is currently on gabapentin for neuropathy, which she finds beneficial. However, she expresses concern about potential side effects of gabapentin, including dementia and irritability.    She had surgery on her foot, during which a bone was redirected and a screw was placed. She reports that her foot is practically numb from the heel to the toes. An ultrasound was performed on her legs prior to the foot surgery, revealing blockages that required stent placement. Her cardiologist discontinued Plavix after a year of having stents placed in her heart.    She reports feeling well overall but admits to some residual depression.         Abnormal Movements  Resolved.   Pt called requested appt for head and leg tremor. Today she has no recollection of complaining of these tremors. Minor tremor in am. Worse at rest. Does not interfere with ADLs. Taking oxc, gbp, metoprolol and C/L 10/100 1 bid        Recent stroke  Taking asa and crestor.  Denies stroke sympotms. Right arm is still weak.       Pt did have a left parietal lobe hemorrhagic stroke 1.7x1.5cm.   Two weeks ago, pt had a hemorrhagic stroke from a brain bleed. Had extremely elevated bp. She was compliant with crestor and plavix.  She cannot have MRI  due to VNS. Her symptoms included right arm numbness, tingling and paralysis.  She was admitted and had CTH, CTA H/N. I do not have DC summary or any of the radiology reports.     She complains today of 2 episodes of RUE abnormal movements and brings in a video where she has to catch her right arm with her left hand and hold it down.  Episodes last 10-13 minutes.  Initially, the arm was immobile, but some improvement has been noted. Persistent numbness in the right arm remains, with movement restricted due to lack of strength rather than pain. No other symptoms such as vision changes, slurred speech, difficulty swallowing, or walking were reported.   States plavix and  crestor were discontinued at discharge and she has not re-started these medications.     She denies any current stroke sx. BP has been controlled at home.     Since she saw me last had an MI (data deficient).      No medications were prescribed or changed during the hospital stay. Rehabilitation included walking exercises the following morning. No cardiac tests were completed during the hospital stay. Currently under the care of cardiologist Dr. Quesada, last seen 3 months ago. A CT scan of the neck without contrast was performed. History includes heart attacks and broken heart syndrome, with 35 percent heart function. Previously on Plavix and Crestor, advised to discontinue due to the brain bleed. Crestor has not been taken for some time, and it is unclear if the cardiologist is aware of the absence of a statin. Scheduled to start therapy and a 2-week rehabilitation program in the hospital.            No typical seizures have been experienced recently. VNS is currently turned off. The last seizure occurred in May 2023.                       History of TIA/CVA  She did not get AFO for right foot and sustained Right foot fracture. Now in walking boot. Trying to avoid surgery.  Right hand and right foot is numb and weak. Right foot will dany when walking. She can trip due to foot weakness.  Seen by cardiology and placed on Repatha. Abnml GXT  [facility] 9/1/22 w Dr Wills. Significant 1 vessel disease w high grade stenosis first diagonal of LAD jailed by 2 stents. Mild in-stent stenosis of mid LAD and patent RCA. EF 66%-medical management adding amlodipine.     PH  3/30/2022 admitted at [facility] for TIA sx of right sided weakness and numbness.  Describes walking down the [place] and her right leg gave out. Developed RUE and RLE numbness and weakness. No vision changes, slurred speech or confusion. Symptoms mostly resolved by the time she arrived in ED.  Overall, it was felt her symptoms were consistent with  TIA.  CTH-calcified meningioma right frontal lobe 7.5 mm in diameter similar to previous study. No hemorrhage.  CTA head and neck-no stenosis  MRI brain not done due to VNS  Echo-neg bubble study w nml EF 66%  A1c 10.2  Dc'd on lopresser 25 bid, imdur 60, plavix 75, asa 81, Crestor 20, trileptal 600 bid     Seizure Disorderw VNS  VNS turned off  No seizures  Medication: bid w  qid  Compliance:does not miss doses  Side effects:None  Last seizure:5/2023. She's not sure she had a sz but when she woke up her face was numb.  Description:right facial, eye, tongue drawing. Bites her tongue. No loss of bladder or bowel. Usually happens at night.     JOSE  Referred to sleep med. Dtr reports she snores less. Denies daytime somnolence. She did not make appt and does not want to go at this time.     Depression  Recently lost her  tragically to arm sepsis. Denies SI.     PMH: CAD with multiple stents, T2DM, HLD, HTN, Sx disorder with VNS, chronic back pain   Subjective      Past Medical History:   Past Medical History:   Diagnosis Date   • Arthritis    • CAD (coronary artery disease)    • Chest tightness or pressure    • Complex partial seizure    • Diabetes mellitus    • Dyslipidemia    • FHx: migraine headaches    • GERD (gastroesophageal reflux disease)    • Hiatal hernia    • History of stroke    • Hypercholesterolemia    • Hypertension    • Myocardial infarction    • JOSE on CPAP    • PONV (postoperative nausea and vomiting)    • Stroke syndrome    • Stroke syndrome        Past Surgical History:   Past Surgical History:   Procedure Laterality Date   • CARDIAC CATHETERIZATION      11 stents   • CARDIAC CATHETERIZATION Left 9/1/2022    Procedure: Coronary angiography;  Surgeon: Jose G Wills IV, MD;  Location:  [facility];  Service: Cardiovascular;  Laterality: Left;   • CORONARY ANGIOPLASTY WITH STENT PLACEMENT     • CORONARY STENT PLACEMENT      x5   • IMPLANTATION VAGAL  NERVE STIMULATOR     • LUMBAR FUSION  07/21/2014    decompression and fusion L4/5 Dr. Palm       Family History:   Family History   Problem Relation Age of Onset   • Heart disease Other    • Diabetes Other    • Cancer Other    • Stroke Other    • Coronary artery disease Mother    • Heart attack Mother    • Coronary artery disease Father        Social History:   Social History     Socioeconomic History   • Marital status:    Tobacco Use   • Smoking status: Former   • Smokeless tobacco: Never   Vaping Use   • Vaping status: Never Used   Substance and Sexual Activity   • Alcohol use: No   • Drug use: Defer   • Sexual activity: Defer       Medications:     Current Outpatient Medications:   •  amLODIPine (NORVASC) 2.5 MG tablet, Take 1 tablet by mouth Daily., Disp: , Rfl:   •  aspirin 81 MG EC tablet, Take 1 tablet by mouth Daily., Disp: , Rfl:   •  BD Pen Needle Sherrill 2nd Gen 32G X 4 MM misc, , Disp: , Rfl:   •  citalopram (CeleXA) 20 MG tablet, Take 1 tablet by mouth Daily., Disp: , Rfl:   •  diazePAM (VALIUM) 10 MG tablet, As Needed. (Patient taking differently: Every 12 (Twelve) Hours As Needed.), Disp: , Rfl:   •  DULoxetine (CYMBALTA) 30 MG capsule, Take 1 capsule by mouth Daily., Disp: , Rfl:   •  famotidine (PEPCID) 40 MG tablet, Take 1 tablet by mouth Daily., Disp: , Rfl:   •  gabapentin (NEURONTIN) 600 MG tablet, 4 (Four) Times a Day., Disp: , Rfl:   •  hydroCHLOROthiazide 25 MG tablet, Take 1 tablet by mouth Daily., Disp: , Rfl:   •  HYDROcodone-acetaminophen (NORCO) 7.5-325 MG per tablet, Every 6 (Six) Hours As Needed., Disp: , Rfl:   •  isosorbide mononitrate (IMDUR) 30 MG 24 hr tablet, Take 1 tablet by mouth Every Morning. NEEDS FOLLOW UP TO CONTINUE REFILLS, Disp: 30 tablet, Rfl: 0  •  metoprolol tartrate (LOPRESSOR) 25 MG tablet, Take 1 tablet by mouth., Disp: , Rfl:   •  Mounjaro 10 MG/0.5ML solution auto-injector, Inject 10 mg under the skin into the appropriate area as directed 1 (One) Time Per  Week., Disp: , Rfl:   •  nitroglycerin (Nitrostat) 0.4 MG SL tablet, Place 1 tablet under the tongue Every 5 (Five) Minutes As Needed for Chest Pain. prn, Disp: 25 tablet, Rfl: 1  •  OXcarbazepine (TRILEPTAL) 600 MG tablet, Take 1 tablet by mouth 2 (Two) Times a Day., Disp: 180 tablet, Rfl: 3  •  pantoprazole (PROTONIX) 40 MG EC tablet, Take 1 tablet by mouth Daily., Disp: , Rfl:   •  promethazine (PHENERGAN) 25 MG tablet, Take 1 tablet by mouth 2 (Two) Times a Day As Needed., Disp: , Rfl:   •  rosuvastatin (CRESTOR) 10 MG tablet, Take 1 tablet by mouth Daily., Disp: , Rfl:   •  traZODone (DESYREL) 150 MG tablet, Take 1 tablet by mouth every night at bedtime., Disp: , Rfl:     Allergies:   Allergies   Allergen Reactions   • Sulfa Antibiotics GI Intolerance       PHQ-9 Total Score:     STEADI Fall Risk Assessment has not been completed.    Objective     Physical Exam:   Physical Exam  Eyes:      General: Lids are normal.      Extraocular Movements: EOM normal. No nystagmus.   Neurological:      Motor: Motor strength is normal.     Deep Tendon Reflexes:      Reflex Scores:       Bicep reflexes are 2+ on the right side and 2+ on the left side.       Patellar reflexes are 1+ on the right side and 1+ on the left side.       Achilles reflexes are 1+ on the right side and 1+ on the left side.  Psychiatric:         Speech: Speech normal.         Neurological Exam  Mental Status  Awake, alert and oriented to person, place and time. Recent and remote memory are intact. Speech is normal. Follows complex commands. Attention and concentration are normal. Fund of knowledge is appropriate for level of education.    Cranial Nerves  CN II: Right visual acuity: Finger movement. Left visual acuity: Finger movement. Right normal visual field. Left normal visual field.  CN III, IV, VI: Extraocular movements intact bilaterally. No nystagmus. Normal saccades. Normal smooth pursuit. Normal lids and orbits bilaterally.   Right pupil: Round.  "  Left pupil: Round.  CN V: Facial sensation is normal.  CN VII: Full and symmetric facial movement.  CN IX, X: Palate elevates symmetrically  CN XI: Shoulder shrug strength is normal.  CN XII: Tongue midline without atrophy or fasciculations.    Motor  Normal muscle bulk throughout. No fasciculations present. Normal muscle tone. No abnormal involuntary movements. Strength is 5/5 throughout all four extremities.    Sensory  Sensation is intact to light touch, pinprick, vibration and proprioception in all four extremities.    Reflexes                                            Right                      Left  Biceps                                 2+                         2+  Patellar                                1+                         1+  Achilles                                1+                         1+    Gait  Casual gait is normal including stance, stride, and arm swing.     Physical Exam  Motor Examination    Strength: Strength is equal in upper and lower extremities.    Coordination: Finger-to-nose test normal.    Involuntary Movements: Minor tremor in both hands, more noticeable in the morning.    Upper Extremity Drift Test: No upper extremity drift.    Reflexes    Deep Tendon Reflexes: Deep tendon reflexes diminished due to neuropathy.    Sensory Examination    Pain and Temperature: Decreased sensation in the foot.    Gait and Station    Gait: Gait affected by left foot surgery.    Vital Signs: Blood pressure 116/80.      Vital Signs:   Vitals:    07/17/25 1434   BP: 116/80   Pulse: 72   SpO2: 97%   Weight: 82.4 kg (181 lb 9.6 oz)   Height: 170.2 cm (67.01\")     Body mass index is 28.44 kg/m².         Assessment / Plan      Assessment/Plan:   Diagnoses and all orders for this visit:    1. Tremor (Primary)  Comments:  Cont GBP and OXC w metoprolol    2. Partial idiopathic epilepsy with seizures of localized onset, intractable, without status epilepticus  Comments:  Cont GBPand OXC  Orders:  -     " OXcarbazepine (TRILEPTAL) 600 MG tablet; Take 1 tablet by mouth 2 (Two) Times a Day.  Dispense: 180 tablet; Refill: 3    3. History of stroke  Comments:  Cont asa and statin    4. Abnormal movements  Comments:  resolved. Stop C/L as trial    5. Mild recurrent major depression  Comments:  cont celexa, trazodone    6. Partial idiopathic epilepsy with seizures of localized onset, intractable, without status epilepticus  Comments:  Cont OXC and GBP. Repeat EEG  Orders:  -     OXcarbazepine (TRILEPTAL) 600 MG tablet; Take 1 tablet by mouth 2 (Two) Times a Day.  Dispense: 180 tablet; Refill: 3       Assessment & Plan  1. Tremors.  Her tremors are likely a result of a minor stroke that did not manifest on imaging. The current dosage of carbidopa-levodopa may be insufficient to effectively manage her symptoms. She will discontinue carbidopa-levodopa and maintain her other medications. A 90-day supply of oxcarbazepine will be provided. If her condition remains stable without carbidopa-levodopa, the medication will be permanently discontinued. However, if her symptoms worsen, she will resume the medication and inform the office.    2. Neuropathy.  She is currently on gabapentin for neuropathy, which appears to be helping. Gabapentin can cause confusion and brain fog, especially in older adults. She will continue her current gabapentin regimen.    3. Sleep apnea.  She has been referred twice for a sleep study but has not yet completed it. She reports no significant snoring or daytime sleepiness. The sleep study referral will be deferred for now.    4. Post-surgical foot care.  She recently had foot surgery involving bone redirection and screw placement. She reports numbness in the foot, which is expected post-surgery. She is advised to take good care of her feet, ensuring there are no sores, foreign objects in her shoes, or signs of infection.    5. Depression.  She reports feeling fine overall but still experiences some  depression, likely due to recent losses. No changes in her current management plan are necessary at this time.        Patient Education:       Reviewed medications, potential side effects and signs and symptoms to report. Discussed risk versus benefits of treatment plan with patient and/or family-including medications, labs and radiology that may be ordered. Addressed questions and concerns during visit. Patient and/or family verbalized understanding and agree with plan. Instructed to call the office with any questions and report to ER with any life-threatening symptoms.     Follow Up:   Return in about 6 months (around 1/17/2026) for Recheck.    During this visit the following were done:  Labs Reviewed [x]    Labs Ordered []    Radiology Reports Reviewed [x]    Radiology Ordered []    PCP Records Reviewed []    Referring Provider Records Reviewed []    ER Records Reviewed []    Hospital Records Reviewed []    History Obtained From Family [x]    Radiology Images Reviewed []    Other Reviewed []    Records Requested []      Patient or patient representative verbalized consent for the use of Ambient Listening during the visit with  Javier Galan DNP, APRN for chart documentation. 7/17/2025  10:47 EDT      Javier Galan DNP, APRN

## 2025-07-17 NOTE — PROGRESS NOTES
Neuro Office Visit      Encounter Date: 2025   Patient Name: Phyllis Iyer  : 1965   MRN: [id number]   PCP: DR Early  Chief Complaint:    Chief Complaint   Patient presents with    Stroke       History of Present Illness: Phyllis Iyer is a 60 y.o. female who is here today in Neurology for  history of stroke, seizure disorder, abnormal movements, status post VNS    Last visit 3/7/2025 -telemedicine visit-cont plavix, crestor, control bp and glucose, cont OXC and GBP. Abnormal movements resolved.  EEG (Hospital Performed) (2025 12:48)-neg    History of Present Illness  The patient presents for evaluation of tremors, neuropathy, and sleep apnea.    Tremor  She reports experiencing minor tremors in both hands, particularly noticeable when she is at rest. These tremors do not interfere with daily activities such as eating, dressing, or bathing. She has been taking carbidopa-levodopa 10/100 mg twice daily without missing any doses but does not find it significantly helpful. She reports no hallucinations, acting out dreams, difficulty swallowing, trouble turning over in bed, or falls. She also mentions that her handwriting has always been sloppy and is unsure if it has changed recently. She recalls an incident where she was informed of a brain bleed due to high blood pressure and elevated blood sugar levels, which resulted in temporary immobility of her arm.    She is currently on gabapentin for neuropathy, which she finds beneficial. However, she expresses concern about potential side effects of gabapentin, including dementia and irritability.    She had surgery on her foot, during which a bone was redirected and a screw was placed. She reports that her foot is practically numb from the heel to the toes. An ultrasound was performed on her legs prior to the foot surgery, revealing blockages that required stent placement. Her cardiologist discontinued Plavix after a year of having stents placed  in her heart.    She reports feeling well overall but admits to some residual depression.         Abnormal Movements  Resolved.   Pt called requested appt for head and leg tremor. Today she has no recollection of complaining of these tremors. Minor tremor in am. Worse at rest. Does not interfere with ADLs. Taking oxc, gbp, metoprolol and C/L 10/100 1 bid        Recent stroke  Taking asa and crestor.  Denies stroke sympotms. Right arm is still weak.       Pt did have a left parietal lobe hemorrhagic stroke 1.7x1.5cm.   Two weeks ago, pt had a hemorrhagic stroke from a brain bleed. Had extremely elevated bp. She was compliant with crestor and plavix.  She cannot have MRI  due to VNS. Her symptoms included right arm numbness, tingling and paralysis.  She was admitted and had CTH, CTA H/N. I do not have DC summary or any of the radiology reports.     She complains today of 2 episodes of RUE abnormal movements and brings in a video where she has to catch her right arm with her left hand and hold it down.  Episodes last 10-13 minutes.  Initially, the arm was immobile, but some improvement has been noted. Persistent numbness in the right arm remains, with movement restricted due to lack of strength rather than pain. No other symptoms such as vision changes, slurred speech, difficulty swallowing, or walking were reported.   States plavix and crestor were discontinued at discharge and she has not re-started these medications.     She denies any current stroke sx. BP has been controlled at home.     Since she saw me last had an MI (data deficient).      No medications were prescribed or changed during the hospital stay. Rehabilitation included walking exercises the following morning. No cardiac tests were completed during the hospital stay. Currently under the care of cardiologist Dr. Quesada, last seen 3 months ago. A CT scan of the neck without contrast was performed. History includes heart attacks and broken heart syndrome,  with 35 percent heart function. Previously on Plavix and Crestor, advised to discontinue due to the brain bleed. Crestor has not been taken for some time, and it is unclear if the cardiologist is aware of the absence of a statin. Scheduled to start therapy and a 2-week rehabilitation program in the hospital.            No typical seizures have been experienced recently. VNS is currently turned off. The last seizure occurred in May 2023.                       History of TIA/CVA  She did not get AFO for right foot and sustained Right foot fracture. Now in walking boot. Trying to avoid surgery.  Right hand and right foot is numb and weak. Right foot will dany when walking. She can trip due to foot weakness.  Seen by cardiology and placed on Repatha. Abnml GXT  [facility] 9/1/22 w Dr Wills. Significant 1 vessel disease w high grade stenosis first diagonal of LAD jailed by 2 stents. Mild in-stent stenosis of mid LAD and patent RCA. EF 66%-medical management adding amlodipine.     PH  3/30/2022 admitted at [facility] for TIA sx of right sided weakness and numbness.  Describes walking down the [place] and her right leg gave out. Developed RUE and RLE numbness and weakness. No vision changes, slurred speech or confusion. Symptoms mostly resolved by the time she arrived in ED.  Overall, it was felt her symptoms were consistent with TIA.  CTH-calcified meningioma right frontal lobe 7.5 mm in diameter similar to previous study. No hemorrhage.  CTA head and neck-no stenosis  MRI brain not done due to VNS  Echo-neg bubble study w nml EF 66%  A1c 10.2  Dc'd on lopresser 25 bid, imdur 60, plavix 75, asa 81, Crestor 20, trileptal 600 bid     Seizure Disorderw VNS  VNS turned off  No seizures  Medication: bid w  qid  Compliance:does not miss doses  Side effects:None  Last seizure:5/2023. She's not sure she had a sz but when she woke up her face was numb.  Description:right facial, eye, tongue drawing. Bites her  tongue. No loss of bladder or bowel. Usually happens at night.     JOSE  Referred to sleep med. Dtr reports she snores less. Denies daytime somnolence. She did not make appt and does not want to go at this time.     Depression  Recently lost her  tragically to arm sepsis. Denies SI.     PMH: CAD with multiple stents, T2DM, HLD, HTN, Sx disorder with VNS, chronic back pain   Subjective      Past Medical History:   Past Medical History:   Diagnosis Date    Arthritis     CAD (coronary artery disease)     Chest tightness or pressure     Complex partial seizure     Diabetes mellitus     Dyslipidemia     FHx: migraine headaches     GERD (gastroesophageal reflux disease)     Hiatal hernia     History of stroke     Hypercholesterolemia     Hypertension     Myocardial infarction     JOSE on CPAP     PONV (postoperative nausea and vomiting)     Stroke syndrome     Stroke syndrome        Past Surgical History:   Past Surgical History:   Procedure Laterality Date    CARDIAC CATHETERIZATION      11 stents    CARDIAC CATHETERIZATION Left 9/1/2022    Procedure: Coronary angiography;  Surgeon: Jose G Wills IV, MD;  Location: [facility] CATH INVASIVE LOC[facility];  Service: Cardiovascular;  Laterality: Left;    CORONARY ANGIOPLASTY WITH STENT PLACEMENT      CORONARY STENT PLACEMENT      x5    IMPLANTATION VAGAL NERVE STIMULATOR      LUMBAR FUSION  07/21/2014    decompression and fusion L4/5 Dr. Palm       Family History:   Family History   Problem Relation Age of Onset    Heart disease Other     Diabetes Other     Cancer Other     Stroke Other     Coronary artery disease Mother     Heart attack Mother     Coronary artery disease Father        Social History:   Social History     Socioeconomic History    Marital status:    Tobacco Use    Smoking status: Former    Smokeless tobacco: Never   Vaping Use    Vaping status: Never Used   Substance and Sexual Activity    Alcohol use: No    Drug use: Defer    Sexual  activity: Defer       Medications:     Current Outpatient Medications:     amLODIPine (NORVASC) 2.5 MG tablet, Take 1 tablet by mouth Daily., Disp: , Rfl:     aspirin 81 MG EC tablet, Take 1 tablet by mouth Daily., Disp: , Rfl:     BD Pen Needle Sherrill 2nd Gen 32G X 4 MM misc, , Disp: , Rfl:     citalopram (CeleXA) 20 MG tablet, Take 1 tablet by mouth Daily., Disp: , Rfl:     diazePAM (VALIUM) 10 MG tablet, As Needed. (Patient taking differently: Every 12 (Twelve) Hours As Needed.), Disp: , Rfl:     DULoxetine (CYMBALTA) 30 MG capsule, Take 1 capsule by mouth Daily., Disp: , Rfl:     famotidine (PEPCID) 40 MG tablet, Take 1 tablet by mouth Daily., Disp: , Rfl:     gabapentin (NEURONTIN) 600 MG tablet, 4 (Four) Times a Day., Disp: , Rfl:     hydroCHLOROthiazide 25 MG tablet, Take 1 tablet by mouth Daily., Disp: , Rfl:     HYDROcodone-acetaminophen (NORCO) 7.5-325 MG per tablet, Every 6 (Six) Hours As Needed., Disp: , Rfl:     isosorbide mononitrate (IMDUR) 30 MG 24 hr tablet, Take 1 tablet by mouth Every Morning. NEEDS FOLLOW UP TO CONTINUE REFILLS, Disp: 30 tablet, Rfl: 0    metoprolol tartrate (LOPRESSOR) 25 MG tablet, Take 1 tablet by mouth., Disp: , Rfl:     Mounjaro 10 MG/0.5ML solution auto-injector, Inject 10 mg under the skin into the appropriate area as directed 1 (One) Time Per Week., Disp: , Rfl:     nitroglycerin (Nitrostat) 0.4 MG SL tablet, Place 1 tablet under the tongue Every 5 (Five) Minutes As Needed for Chest Pain. prn, Disp: 25 tablet, Rfl: 1    OXcarbazepine (TRILEPTAL) 600 MG tablet, Take 1 tablet by mouth 2 (Two) Times a Day., Disp: 180 tablet, Rfl: 3    pantoprazole (PROTONIX) 40 MG EC tablet, Take 1 tablet by mouth Daily., Disp: , Rfl:     promethazine (PHENERGAN) 25 MG tablet, Take 1 tablet by mouth 2 (Two) Times a Day As Needed., Disp: , Rfl:     rosuvastatin (CRESTOR) 10 MG tablet, Take 1 tablet by mouth Daily., Disp: , Rfl:     traZODone (DESYREL) 150 MG tablet, Take 1 tablet by mouth every  night at bedtime., Disp: , Rfl:     Allergies:   Allergies   Allergen Reactions    Sulfa Antibiotics GI Intolerance       PHQ-9 Total Score:     STEADI Fall Risk Assessment has not been completed.    Objective     Physical Exam:   Physical Exam  Eyes:      General: Lids are normal.      Extraocular Movements: EOM normal. No nystagmus.   Neurological:      Motor: Motor strength is normal.     Deep Tendon Reflexes:      Reflex Scores:       Bicep reflexes are 2+ on the right side and 2+ on the left side.       Patellar reflexes are 1+ on the right side and 1+ on the left side.       Achilles reflexes are 1+ on the right side and 1+ on the left side.  Psychiatric:         Speech: Speech normal.         Neurological Exam  Mental Status  Awake, alert and oriented to person, place and time. Recent and remote memory are intact. Speech is normal. Follows complex commands. Attention and concentration are normal. Fund of knowledge is appropriate for level of education.    Cranial Nerves  CN II: Right visual acuity: Finger movement. Left visual acuity: Finger movement. Right normal visual field. Left normal visual field.  CN III, IV, VI: Extraocular movements intact bilaterally. No nystagmus. Normal saccades. Normal smooth pursuit. Normal lids and orbits bilaterally.   Right pupil: Round.   Left pupil: Round.  CN V: Facial sensation is normal.  CN VII: Full and symmetric facial movement.  CN IX, X: Palate elevates symmetrically  CN XI: Shoulder shrug strength is normal.  CN XII: Tongue midline without atrophy or fasciculations.    Motor  Normal muscle bulk throughout. No fasciculations present. Normal muscle tone. No abnormal involuntary movements. Strength is 5/5 throughout all four extremities.    Sensory  Sensation is intact to light touch, pinprick, vibration and proprioception in all four extremities.    Reflexes                                            Right                      Left  Biceps                              "    2+                         2+  Patellar                                1+                         1+  Achilles                                1+                         1+    Gait  Casual gait is normal including stance, stride, and arm swing.     Physical Exam  Motor Examination    Strength: Strength is equal in upper and lower extremities.    Coordination: Finger-to-nose test normal.    Involuntary Movements: Minor tremor in both hands, more noticeable in the morning.    Upper Extremity Drift Test: No upper extremity drift.    Reflexes    Deep Tendon Reflexes: Deep tendon reflexes diminished due to neuropathy.    Sensory Examination    Pain and Temperature: Decreased sensation in the foot.    Gait and Station    Gait: Gait affected by left foot surgery.    Vital Signs: Blood pressure 116/80.      Vital Signs:   Vitals:    07/17/25 1434   BP: 116/80   Pulse: 72   SpO2: 97%   Weight: 82.4 kg (181 lb 9.6 oz)   Height: 170.2 cm (67.01\")     Body mass index is 28.44 kg/m².         Assessment / Plan      Assessment/Plan:   Diagnoses and all orders for this visit:    1. Tremor (Primary)  Comments:  Cont GBP and OXC w metoprolol    2. Partial idiopathic epilepsy with seizures of localized onset, intractable, without status epilepticus  Comments:  Cont GBPand OXC  Orders:  -     OXcarbazepine (TRILEPTAL) 600 MG tablet; Take 1 tablet by mouth 2 (Two) Times a Day.  Dispense: 180 tablet; Refill: 3    3. History of stroke  Comments:  Cont asa and statin    4. Abnormal movements  Comments:  resolved. Stop C/L as trial    5. Mild recurrent major depression  Comments:  cont celexa, trazodone    6. Partial idiopathic epilepsy with seizures of localized onset, intractable, without status epilepticus  Comments:  Cont OXC and GBP. Repeat EEG  Orders:  -     OXcarbazepine (TRILEPTAL) 600 MG tablet; Take 1 tablet by mouth 2 (Two) Times a Day.  Dispense: 180 tablet; Refill: 3       Assessment & Plan  1. Tremors.  Her tremors are " likely a result of a minor stroke that did not manifest on imaging. The current dosage of carbidopa-levodopa may be insufficient to effectively manage her symptoms. She will discontinue carbidopa-levodopa and maintain her other medications. A 90-day supply of oxcarbazepine will be provided. If her condition remains stable without carbidopa-levodopa, the medication will be permanently discontinued. However, if her symptoms worsen, she will resume the medication and inform the office.    2. Neuropathy.  She is currently on gabapentin for neuropathy, which appears to be helping. Gabapentin can cause confusion and brain fog, especially in older adults. She will continue her current gabapentin regimen.    3. Sleep apnea.  She has been referred twice for a sleep study but has not yet completed it. She reports no significant snoring or daytime sleepiness. The sleep study referral will be deferred for now.    4. Post-surgical foot care.  She recently had foot surgery involving bone redirection and screw placement. She reports numbness in the foot, which is expected post-surgery. She is advised to take good care of her feet, ensuring there are no sores, foreign objects in her shoes, or signs of infection.    5. Depression.  She reports feeling fine overall but still experiences some depression, likely due to recent losses. No changes in her current management plan are necessary at this time.        Patient Education:       Reviewed medications, potential side effects and signs and symptoms to report. Discussed risk versus benefits of treatment plan with patient and/or family-including medications, labs and radiology that may be ordered. Addressed questions and concerns during visit. Patient and/or family verbalized understanding and agree with plan. Instructed to call the office with any questions and report to ER with any life-threatening symptoms.     Follow Up:   Return in about 6 months (around 1/17/2026) for  Recheck.    During this visit the following were done:  Labs Reviewed [x]    Labs Ordered []    Radiology Reports Reviewed [x]    Radiology Ordered []    PCP Records Reviewed []    Referring Provider Records Reviewed []    ER Records Reviewed []    Hospital Records Reviewed []    History Obtained From Family [x]    Radiology Images Reviewed []    Other Reviewed []    Records Requested []      Patient or patient representative verbalized consent for the use of Ambient Listening during the visit with  Javier Galan DNP, APRN for chart documentation. 7/17/2025  10:47 EDT      Javier Galan DNP, APRN

## 2025-08-06 ENCOUNTER — OFFICE VISIT (OUTPATIENT)
Dept: GASTROENTEROLOGY | Facility: CLINIC | Age: 60
End: 2025-08-06
Payer: MEDICARE

## 2025-08-06 VITALS
SYSTOLIC BLOOD PRESSURE: 110 MMHG | HEART RATE: 68 BPM | DIASTOLIC BLOOD PRESSURE: 72 MMHG | BODY MASS INDEX: 28.22 KG/M2 | WEIGHT: 180.2 LBS

## 2025-08-06 DIAGNOSIS — R11.2 NAUSEA AND VOMITING, UNSPECIFIED VOMITING TYPE: ICD-10-CM

## 2025-08-06 DIAGNOSIS — R13.19 ESOPHAGEAL DYSPHAGIA: Primary | ICD-10-CM

## 2025-08-06 DIAGNOSIS — K21.9 GASTROESOPHAGEAL REFLUX DISEASE, UNSPECIFIED WHETHER ESOPHAGITIS PRESENT: ICD-10-CM

## 2025-08-06 PROCEDURE — 99204 OFFICE O/P NEW MOD 45 MIN: CPT

## 2025-08-06 PROCEDURE — 3078F DIAST BP <80 MM HG: CPT

## 2025-08-06 PROCEDURE — 1159F MED LIST DOCD IN RCRD: CPT

## 2025-08-06 PROCEDURE — 3074F SYST BP LT 130 MM HG: CPT

## 2025-08-06 PROCEDURE — 1160F RVW MEDS BY RX/DR IN RCRD: CPT

## 2025-08-06 RX ORDER — PANTOPRAZOLE SODIUM 40 MG/1
40 TABLET, DELAYED RELEASE ORAL 2 TIMES DAILY
Qty: 60 TABLET | Refills: 5 | Status: SHIPPED | OUTPATIENT
Start: 2025-08-06

## 2025-08-06 RX ORDER — FAMOTIDINE 40 MG/1
40 TABLET, FILM COATED ORAL 2 TIMES DAILY
Qty: 60 TABLET | Refills: 5 | Status: SHIPPED | OUTPATIENT
Start: 2025-08-06

## 2025-08-14 RX ORDER — CARBIDOPA/LEVODOPA 10MG-100MG
1 TABLET ORAL EVERY 12 HOURS SCHEDULED
Qty: 180 TABLET | Refills: 3 | Status: SHIPPED | OUTPATIENT
Start: 2025-08-14

## 2025-08-18 ENCOUNTER — HOSPITAL ENCOUNTER (OUTPATIENT)
Dept: GENERAL RADIOLOGY | Facility: HOSPITAL | Age: 60
Discharge: HOME | End: 2025-08-18
Payer: MEDICARE

## 2025-08-18 DIAGNOSIS — R13.19 ESOPHAGEAL DYSPHAGIA: ICD-10-CM

## 2025-08-18 PROCEDURE — 74220 X-RAY XM ESOPHAGUS 1CNTRST: CPT

## 2025-08-18 PROCEDURE — 74220 X-RAY XM ESOPHAGUS 1CNTRST: CPT | Performed by: RADIOLOGY

## 2025-08-20 ENCOUNTER — RESULTS FOLLOW-UP (OUTPATIENT)
Dept: GASTROENTEROLOGY | Facility: CLINIC | Age: 60
End: 2025-08-20
Payer: MEDICARE

## (undated) DEVICE — GLIDESHEATH BASIC HYDROPHILIC COATED INTRODUCER SHEATH: Brand: GLIDESHEATH

## (undated) DEVICE — NDL ANGIOGR ADV THN SMOTH SGLWALL 21G 1.5

## (undated) DEVICE — DEV COMP RAD PRELUDESYNC 24CM

## (undated) DEVICE — CATH DIAG EXPO .056 FL3.5 6F 100CM

## (undated) DEVICE — ST EXT IV SMARTSITE 2VLV SP M LL 5ML IV1

## (undated) DEVICE — PK CATH CARD 10

## (undated) DEVICE — MODEL BT2000 P/N 700287-012KIT CONTENTS: MANIFOLD WITH SALINE AND CONTRAST PORTS, SALINE TUBING WITH SPIKE AND HAND SYRINGE, TRANSDUCER: Brand: BT2000 AUTOMATED MANIFOLD KIT

## (undated) DEVICE — CVR PROB ULTRASND/TRANSD W/GEL 7X11IN STRL

## (undated) DEVICE — ADULT, W/LG. BACK PAD, RADIOTRANSPARENT ELEMENT AND LEAD WIRE: Brand: DEFIBRILLATION ELECTRODES

## (undated) DEVICE — MODEL AT P65, P/N 701554-001KIT CONTENTS: HAND CONTROLLER, 3-WAY HIGH-PRESSURE STOPCOCK WITH ROTATING END AND PREMIUM HIGH-PRESSURE TUBING: Brand: ANGIOTOUCH® KIT

## (undated) DEVICE — GW PERIPH GUIDERIGHT STD/EXCHNG/J/TIP SS 0.035IN 5X260CM

## (undated) DEVICE — ST INF PRI SMRTSTE 20DRP 2VLV 24ML 117

## (undated) DEVICE — CATH DIAG EXPO M/ PK 6FR FL4/FR4 PIG 3PK